# Patient Record
Sex: FEMALE | Race: ASIAN | NOT HISPANIC OR LATINO | URBAN - METROPOLITAN AREA
[De-identification: names, ages, dates, MRNs, and addresses within clinical notes are randomized per-mention and may not be internally consistent; named-entity substitution may affect disease eponyms.]

---

## 2019-03-04 ENCOUNTER — INPATIENT (INPATIENT)
Facility: HOSPITAL | Age: 84
LOS: 13 days | DRG: 208 | End: 2019-03-18
Attending: INTERNAL MEDICINE | Admitting: INTERNAL MEDICINE
Payer: MEDICARE

## 2019-03-04 VITALS
DIASTOLIC BLOOD PRESSURE: 78 MMHG | SYSTOLIC BLOOD PRESSURE: 126 MMHG | HEART RATE: 62 BPM | OXYGEN SATURATION: 96 % | WEIGHT: 80.03 LBS | RESPIRATION RATE: 19 BRPM

## 2019-03-04 DIAGNOSIS — J96.91 RESPIRATORY FAILURE, UNSPECIFIED WITH HYPOXIA: ICD-10-CM

## 2019-03-04 LAB
ALBUMIN SERPL ELPH-MCNC: 2.9 G/DL — LOW (ref 3.3–5)
ALBUMIN SERPL ELPH-MCNC: 3.6 G/DL — SIGNIFICANT CHANGE UP (ref 3.3–5)
ALP SERPL-CCNC: 106 U/L — SIGNIFICANT CHANGE UP (ref 40–120)
ALP SERPL-CCNC: 89 U/L — SIGNIFICANT CHANGE UP (ref 40–120)
ALT FLD-CCNC: 18 U/L — SIGNIFICANT CHANGE UP (ref 10–45)
ALT FLD-CCNC: 19 U/L — SIGNIFICANT CHANGE UP (ref 10–45)
ANION GAP SERPL CALC-SCNC: 13 MMOL/L — SIGNIFICANT CHANGE UP (ref 5–17)
ANION GAP SERPL CALC-SCNC: 14 MMOL/L — SIGNIFICANT CHANGE UP (ref 5–17)
APPEARANCE UR: CLEAR — SIGNIFICANT CHANGE UP
APTT BLD: 27.5 SEC — SIGNIFICANT CHANGE UP (ref 27.5–36.3)
APTT BLD: 29.9 SEC — SIGNIFICANT CHANGE UP (ref 27.5–36.3)
AST SERPL-CCNC: 33 U/L — SIGNIFICANT CHANGE UP (ref 10–40)
AST SERPL-CCNC: 36 U/L — SIGNIFICANT CHANGE UP (ref 10–40)
BACTERIA # UR AUTO: NEGATIVE — SIGNIFICANT CHANGE UP
BASOPHILS # BLD AUTO: 0 K/UL — SIGNIFICANT CHANGE UP (ref 0–0.2)
BASOPHILS # BLD AUTO: 0 K/UL — SIGNIFICANT CHANGE UP (ref 0–0.2)
BASOPHILS NFR BLD AUTO: 0.1 % — SIGNIFICANT CHANGE UP (ref 0–2)
BASOPHILS NFR BLD AUTO: 0.3 % — SIGNIFICANT CHANGE UP (ref 0–2)
BILIRUB SERPL-MCNC: 0.6 MG/DL — SIGNIFICANT CHANGE UP (ref 0.2–1.2)
BILIRUB SERPL-MCNC: 0.7 MG/DL — SIGNIFICANT CHANGE UP (ref 0.2–1.2)
BILIRUB UR-MCNC: NEGATIVE — SIGNIFICANT CHANGE UP
BUN SERPL-MCNC: 47 MG/DL — HIGH (ref 7–23)
BUN SERPL-MCNC: 49 MG/DL — HIGH (ref 7–23)
CALCIUM SERPL-MCNC: 9.3 MG/DL — SIGNIFICANT CHANGE UP (ref 8.4–10.5)
CALCIUM SERPL-MCNC: 9.9 MG/DL — SIGNIFICANT CHANGE UP (ref 8.4–10.5)
CHLORIDE SERPL-SCNC: 100 MMOL/L — SIGNIFICANT CHANGE UP (ref 96–108)
CHLORIDE SERPL-SCNC: 99 MMOL/L — SIGNIFICANT CHANGE UP (ref 96–108)
CK MB BLD-MCNC: 2.7 % — SIGNIFICANT CHANGE UP (ref 0–3.5)
CK MB CFR SERPL CALC: 4.2 NG/ML — HIGH (ref 0–3.8)
CK SERPL-CCNC: 154 U/L — SIGNIFICANT CHANGE UP (ref 25–170)
CO2 SERPL-SCNC: 31 MMOL/L — SIGNIFICANT CHANGE UP (ref 22–31)
CO2 SERPL-SCNC: 32 MMOL/L — HIGH (ref 22–31)
COLOR SPEC: YELLOW — SIGNIFICANT CHANGE UP
CREAT ?TM UR-MCNC: 71 MG/DL — SIGNIFICANT CHANGE UP
CREAT SERPL-MCNC: 1.39 MG/DL — HIGH (ref 0.5–1.3)
CREAT SERPL-MCNC: 1.52 MG/DL — HIGH (ref 0.5–1.3)
DIFF PNL FLD: NEGATIVE — SIGNIFICANT CHANGE UP
DIGOXIN SERPL-MCNC: 2.9 NG/ML — CRITICAL HIGH (ref 0.8–2)
EOSINOPHIL # BLD AUTO: 0 K/UL — SIGNIFICANT CHANGE UP (ref 0–0.5)
EOSINOPHIL # BLD AUTO: 0 K/UL — SIGNIFICANT CHANGE UP (ref 0–0.5)
EOSINOPHIL NFR BLD AUTO: 0.2 % — SIGNIFICANT CHANGE UP (ref 0–6)
EOSINOPHIL NFR BLD AUTO: 0.5 % — SIGNIFICANT CHANGE UP (ref 0–6)
EPI CELLS # UR: 1 /HPF — SIGNIFICANT CHANGE UP
GAS PNL BLDA: SIGNIFICANT CHANGE UP
GAS PNL BLDV: SIGNIFICANT CHANGE UP
GLUCOSE SERPL-MCNC: 268 MG/DL — HIGH (ref 70–99)
GLUCOSE SERPL-MCNC: 269 MG/DL — HIGH (ref 70–99)
GLUCOSE UR QL: ABNORMAL
HCT VFR BLD CALC: 30.7 % — LOW (ref 34.5–45)
HCT VFR BLD CALC: 37.1 % — SIGNIFICANT CHANGE UP (ref 34.5–45)
HGB BLD-MCNC: 10.1 G/DL — LOW (ref 11.5–15.5)
HGB BLD-MCNC: 11.9 G/DL — SIGNIFICANT CHANGE UP (ref 11.5–15.5)
HYALINE CASTS # UR AUTO: 10 /LPF — HIGH (ref 0–7)
INR BLD: 1.03 RATIO — SIGNIFICANT CHANGE UP (ref 0.88–1.16)
INR BLD: 1.03 RATIO — SIGNIFICANT CHANGE UP (ref 0.88–1.16)
KETONES UR-MCNC: NEGATIVE — SIGNIFICANT CHANGE UP
LEUKOCYTE ESTERASE UR-ACNC: NEGATIVE — SIGNIFICANT CHANGE UP
LYMPHOCYTES # BLD AUTO: 0.4 K/UL — LOW (ref 1–3.3)
LYMPHOCYTES # BLD AUTO: 0.5 K/UL — LOW (ref 1–3.3)
LYMPHOCYTES # BLD AUTO: 7.3 % — LOW (ref 13–44)
LYMPHOCYTES # BLD AUTO: 7.5 % — LOW (ref 13–44)
MAGNESIUM SERPL-MCNC: 2.2 MG/DL — SIGNIFICANT CHANGE UP (ref 1.6–2.6)
MCHC RBC-ENTMCNC: 31.9 GM/DL — LOW (ref 32–36)
MCHC RBC-ENTMCNC: 32.2 PG — SIGNIFICANT CHANGE UP (ref 27–34)
MCHC RBC-ENTMCNC: 32.8 GM/DL — SIGNIFICANT CHANGE UP (ref 32–36)
MCHC RBC-ENTMCNC: 32.8 PG — SIGNIFICANT CHANGE UP (ref 27–34)
MCV RBC AUTO: 100 FL — SIGNIFICANT CHANGE UP (ref 80–100)
MCV RBC AUTO: 101 FL — HIGH (ref 80–100)
MONOCYTES # BLD AUTO: 0.5 K/UL — SIGNIFICANT CHANGE UP (ref 0–0.9)
MONOCYTES # BLD AUTO: 0.7 K/UL — SIGNIFICANT CHANGE UP (ref 0–0.9)
MONOCYTES NFR BLD AUTO: 10.9 % — SIGNIFICANT CHANGE UP (ref 2–14)
MONOCYTES NFR BLD AUTO: 9.3 % — SIGNIFICANT CHANGE UP (ref 2–14)
NEUTROPHILS # BLD AUTO: 4.5 K/UL — SIGNIFICANT CHANGE UP (ref 1.8–7.4)
NEUTROPHILS # BLD AUTO: 5.4 K/UL — SIGNIFICANT CHANGE UP (ref 1.8–7.4)
NEUTROPHILS NFR BLD AUTO: 81.2 % — HIGH (ref 43–77)
NEUTROPHILS NFR BLD AUTO: 82.8 % — HIGH (ref 43–77)
NITRITE UR-MCNC: NEGATIVE — SIGNIFICANT CHANGE UP
PH UR: 5.5 — SIGNIFICANT CHANGE UP (ref 5–8)
PHOSPHATE SERPL-MCNC: 4.7 MG/DL — HIGH (ref 2.5–4.5)
PLATELET # BLD AUTO: 178 K/UL — SIGNIFICANT CHANGE UP (ref 150–400)
PLATELET # BLD AUTO: 233 K/UL — SIGNIFICANT CHANGE UP (ref 150–400)
POTASSIUM SERPL-MCNC: 5.1 MMOL/L — SIGNIFICANT CHANGE UP (ref 3.5–5.3)
POTASSIUM SERPL-MCNC: 5.2 MMOL/L — SIGNIFICANT CHANGE UP (ref 3.5–5.3)
POTASSIUM SERPL-SCNC: 5.1 MMOL/L — SIGNIFICANT CHANGE UP (ref 3.5–5.3)
POTASSIUM SERPL-SCNC: 5.2 MMOL/L — SIGNIFICANT CHANGE UP (ref 3.5–5.3)
PROCALCITONIN SERPL-MCNC: 0.33 NG/ML — HIGH (ref 0.02–0.1)
PROT SERPL-MCNC: 6.2 G/DL — SIGNIFICANT CHANGE UP (ref 6–8.3)
PROT SERPL-MCNC: 7.4 G/DL — SIGNIFICANT CHANGE UP (ref 6–8.3)
PROT UR-MCNC: 100 — SIGNIFICANT CHANGE UP
PROTHROM AB SERPL-ACNC: 11.9 SEC — SIGNIFICANT CHANGE UP (ref 10–12.9)
PROTHROM AB SERPL-ACNC: 11.9 SEC — SIGNIFICANT CHANGE UP (ref 10–12.9)
RAPID RVP RESULT: SIGNIFICANT CHANGE UP
RBC # BLD: 3.07 M/UL — LOW (ref 3.8–5.2)
RBC # BLD: 3.69 M/UL — LOW (ref 3.8–5.2)
RBC # FLD: 15.4 % — HIGH (ref 10.3–14.5)
RBC # FLD: 15.6 % — HIGH (ref 10.3–14.5)
RBC CASTS # UR COMP ASSIST: 2 /HPF — SIGNIFICANT CHANGE UP (ref 0–4)
SODIUM SERPL-SCNC: 144 MMOL/L — SIGNIFICANT CHANGE UP (ref 135–145)
SODIUM SERPL-SCNC: 145 MMOL/L — SIGNIFICANT CHANGE UP (ref 135–145)
SODIUM UR-SCNC: 46 MMOL/L — SIGNIFICANT CHANGE UP
SP GR SPEC: 1.02 — SIGNIFICANT CHANGE UP (ref 1.01–1.02)
TROPONIN T, HIGH SENSITIVITY RESULT: 61 NG/L — HIGH (ref 0–51)
TROPONIN T, HIGH SENSITIVITY RESULT: 77 NG/L — HIGH (ref 0–51)
UROBILINOGEN FLD QL: ABNORMAL
WBC # BLD: 5.5 K/UL — SIGNIFICANT CHANGE UP (ref 3.8–10.5)
WBC # BLD: 6.6 K/UL — SIGNIFICANT CHANGE UP (ref 3.8–10.5)
WBC # FLD AUTO: 5.5 K/UL — SIGNIFICANT CHANGE UP (ref 3.8–10.5)
WBC # FLD AUTO: 6.6 K/UL — SIGNIFICANT CHANGE UP (ref 3.8–10.5)
WBC UR QL: 1 /HPF — SIGNIFICANT CHANGE UP (ref 0–5)

## 2019-03-04 PROCEDURE — 71045 X-RAY EXAM CHEST 1 VIEW: CPT | Mod: 26,76

## 2019-03-04 PROCEDURE — 99291 CRITICAL CARE FIRST HOUR: CPT

## 2019-03-04 PROCEDURE — 71045 X-RAY EXAM CHEST 1 VIEW: CPT | Mod: 26,77

## 2019-03-04 PROCEDURE — 31500 INSERT EMERGENCY AIRWAY: CPT

## 2019-03-04 PROCEDURE — 99291 CRITICAL CARE FIRST HOUR: CPT | Mod: 25,GC

## 2019-03-04 PROCEDURE — 71250 CT THORAX DX C-: CPT | Mod: 26

## 2019-03-04 RX ORDER — INSULIN LISPRO 100/ML
VIAL (ML) SUBCUTANEOUS EVERY 6 HOURS
Qty: 0 | Refills: 0 | Status: DISCONTINUED | OUTPATIENT
Start: 2019-03-04 | End: 2019-03-07

## 2019-03-04 RX ORDER — ETOMIDATE 2 MG/ML
20 INJECTION INTRAVENOUS ONCE
Qty: 0 | Refills: 0 | Status: COMPLETED | OUTPATIENT
Start: 2019-03-04 | End: 2019-03-04

## 2019-03-04 RX ORDER — VANCOMYCIN HCL 1 G
1000 VIAL (EA) INTRAVENOUS ONCE
Qty: 0 | Refills: 0 | Status: DISCONTINUED | OUTPATIENT
Start: 2019-03-04 | End: 2019-03-04

## 2019-03-04 RX ORDER — ACETAMINOPHEN 500 MG
1000 TABLET ORAL ONCE
Qty: 0 | Refills: 0 | Status: DISCONTINUED | OUTPATIENT
Start: 2019-03-04 | End: 2019-03-04

## 2019-03-04 RX ORDER — SODIUM CHLORIDE 9 MG/ML
1000 INJECTION, SOLUTION INTRAVENOUS
Qty: 0 | Refills: 0 | Status: DISCONTINUED | OUTPATIENT
Start: 2019-03-04 | End: 2019-03-05

## 2019-03-04 RX ORDER — PIPERACILLIN AND TAZOBACTAM 4; .5 G/20ML; G/20ML
3.38 INJECTION, POWDER, LYOPHILIZED, FOR SOLUTION INTRAVENOUS EVERY 12 HOURS
Qty: 0 | Refills: 0 | Status: DISCONTINUED | OUTPATIENT
Start: 2019-03-04 | End: 2019-03-17

## 2019-03-04 RX ORDER — ASPIRIN/CALCIUM CARB/MAGNESIUM 324 MG
81 TABLET ORAL DAILY
Qty: 0 | Refills: 0 | Status: DISCONTINUED | OUTPATIENT
Start: 2019-03-04 | End: 2019-03-17

## 2019-03-04 RX ORDER — GLUCAGON INJECTION, SOLUTION 0.5 MG/.1ML
1 INJECTION, SOLUTION SUBCUTANEOUS ONCE
Qty: 0 | Refills: 0 | Status: DISCONTINUED | OUTPATIENT
Start: 2019-03-04 | End: 2019-03-05

## 2019-03-04 RX ORDER — IPRATROPIUM/ALBUTEROL SULFATE 18-103MCG
3 AEROSOL WITH ADAPTER (GRAM) INHALATION EVERY 6 HOURS
Qty: 0 | Refills: 0 | Status: DISCONTINUED | OUTPATIENT
Start: 2019-03-04 | End: 2019-03-18

## 2019-03-04 RX ORDER — DEXTROSE 50 % IN WATER 50 %
25 SYRINGE (ML) INTRAVENOUS ONCE
Qty: 0 | Refills: 0 | Status: DISCONTINUED | OUTPATIENT
Start: 2019-03-04 | End: 2019-03-05

## 2019-03-04 RX ORDER — FENTANYL CITRATE 50 UG/ML
0.5 INJECTION INTRAVENOUS
Qty: 2500 | Refills: 0 | Status: DISCONTINUED | OUTPATIENT
Start: 2019-03-04 | End: 2019-03-04

## 2019-03-04 RX ORDER — PIPERACILLIN AND TAZOBACTAM 4; .5 G/20ML; G/20ML
3.38 INJECTION, POWDER, LYOPHILIZED, FOR SOLUTION INTRAVENOUS ONCE
Qty: 0 | Refills: 0 | Status: COMPLETED | OUTPATIENT
Start: 2019-03-04 | End: 2019-03-04

## 2019-03-04 RX ORDER — SUCCINYLCHOLINE CHLORIDE 100 MG/5ML
100 SYRINGE (ML) INTRAVENOUS ONCE
Qty: 0 | Refills: 0 | Status: COMPLETED | OUTPATIENT
Start: 2019-03-04 | End: 2019-03-04

## 2019-03-04 RX ORDER — FENTANYL CITRATE 50 UG/ML
0.5 INJECTION INTRAVENOUS
Qty: 5000 | Refills: 0 | Status: DISCONTINUED | OUTPATIENT
Start: 2019-03-04 | End: 2019-03-04

## 2019-03-04 RX ORDER — AZITHROMYCIN 500 MG/1
500 TABLET, FILM COATED ORAL EVERY 24 HOURS
Qty: 0 | Refills: 0 | Status: DISCONTINUED | OUTPATIENT
Start: 2019-03-04 | End: 2019-03-07

## 2019-03-04 RX ORDER — FENTANYL CITRATE 50 UG/ML
12.5 INJECTION INTRAVENOUS EVERY 4 HOURS
Qty: 0 | Refills: 0 | Status: DISCONTINUED | OUTPATIENT
Start: 2019-03-04 | End: 2019-03-07

## 2019-03-04 RX ORDER — ALBUTEROL 90 UG/1
2 AEROSOL, METERED ORAL EVERY 12 HOURS
Qty: 0 | Refills: 0 | Status: DISCONTINUED | OUTPATIENT
Start: 2019-03-04 | End: 2019-03-18

## 2019-03-04 RX ORDER — FUROSEMIDE 40 MG
40 TABLET ORAL ONCE
Qty: 0 | Refills: 0 | Status: COMPLETED | OUTPATIENT
Start: 2019-03-04 | End: 2019-03-04

## 2019-03-04 RX ORDER — DEXTROSE 50 % IN WATER 50 %
12.5 SYRINGE (ML) INTRAVENOUS ONCE
Qty: 0 | Refills: 0 | Status: DISCONTINUED | OUTPATIENT
Start: 2019-03-04 | End: 2019-03-05

## 2019-03-04 RX ORDER — MIDAZOLAM HYDROCHLORIDE 1 MG/ML
0.02 INJECTION, SOLUTION INTRAMUSCULAR; INTRAVENOUS
Qty: 100 | Refills: 0 | Status: DISCONTINUED | OUTPATIENT
Start: 2019-03-04 | End: 2019-03-04

## 2019-03-04 RX ORDER — NOREPINEPHRINE BITARTRATE/D5W 8 MG/250ML
0.05 PLASTIC BAG, INJECTION (ML) INTRAVENOUS
Qty: 8 | Refills: 0 | Status: DISCONTINUED | OUTPATIENT
Start: 2019-03-04 | End: 2019-03-05

## 2019-03-04 RX ORDER — HEPARIN SODIUM 5000 [USP'U]/ML
5000 INJECTION INTRAVENOUS; SUBCUTANEOUS EVERY 12 HOURS
Qty: 0 | Refills: 0 | Status: DISCONTINUED | OUTPATIENT
Start: 2019-03-04 | End: 2019-03-17

## 2019-03-04 RX ORDER — DEXTROSE 50 % IN WATER 50 %
15 SYRINGE (ML) INTRAVENOUS ONCE
Qty: 0 | Refills: 0 | Status: DISCONTINUED | OUTPATIENT
Start: 2019-03-04 | End: 2019-03-05

## 2019-03-04 RX ORDER — VANCOMYCIN HCL 1 G
750 VIAL (EA) INTRAVENOUS EVERY 24 HOURS
Qty: 0 | Refills: 0 | Status: DISCONTINUED | OUTPATIENT
Start: 2019-03-04 | End: 2019-03-07

## 2019-03-04 RX ORDER — AZITHROMYCIN 500 MG/1
500 TABLET, FILM COATED ORAL ONCE
Qty: 0 | Refills: 0 | Status: COMPLETED | OUTPATIENT
Start: 2019-03-04 | End: 2019-03-04

## 2019-03-04 RX ADMIN — AZITHROMYCIN 250 MILLIGRAM(S): 500 TABLET, FILM COATED ORAL at 16:07

## 2019-03-04 RX ADMIN — Medication 40 MILLIGRAM(S): at 14:24

## 2019-03-04 RX ADMIN — HEPARIN SODIUM 5000 UNIT(S): 5000 INJECTION INTRAVENOUS; SUBCUTANEOUS at 18:51

## 2019-03-04 RX ADMIN — Medication 2: at 21:49

## 2019-03-04 RX ADMIN — Medication 81 MILLIGRAM(S): at 21:49

## 2019-03-04 RX ADMIN — Medication 100 MILLIGRAM(S): at 15:52

## 2019-03-04 RX ADMIN — FENTANYL CITRATE 0.91 MICROGRAM(S)/KG/HR: 50 INJECTION INTRAVENOUS at 16:30

## 2019-03-04 RX ADMIN — ETOMIDATE 20 MILLIGRAM(S): 2 INJECTION INTRAVENOUS at 15:52

## 2019-03-04 RX ADMIN — PIPERACILLIN AND TAZOBACTAM 200 GRAM(S): 4; .5 INJECTION, POWDER, LYOPHILIZED, FOR SOLUTION INTRAVENOUS at 14:24

## 2019-03-04 RX ADMIN — Medication 3.4 MICROGRAM(S)/KG/MIN: at 22:51

## 2019-03-04 NOTE — ED PROVIDER NOTE - CRITICAL CARE PROVIDED
direct patient care (not related to procedure)/consultation with other physicians/conducted a detailed discussion of DNR status/additional history taking/documentation/consult w/ pt's family directly relating to pts condition

## 2019-03-04 NOTE — ED PROCEDURE NOTE - CPROC ED INFORMED CONSENT1
This was an emergent procedure and consent was implied./family aware and agree
Benefits, risks, and possible complications of procedure explained to patient/caregiver who verbalized understanding and gave verbal consent.

## 2019-03-04 NOTE — CONSULT NOTE ADULT - SUBJECTIVE AND OBJECTIVE BOX
CHIEF COMPLAINT:    HPI:    PAST MEDICAL & SURGICAL HISTORY:  Arthritis  Asthma  Diabetes  Hypertension  Alzheimer's dementia  H/O abdominal surgery      FAMILY HISTORY:  No pertinent family history in first degree relatives      SOCIAL HISTORY:  Smoking: [ ] Never Smoked [ ] Former Smoker (__ packs x ___ years) [ ] Current Smoker  (__ packs x ___ years)  Substance Use: [ ] Never Used [ ] Used ____  EtOH Use:  Marital Status: [ ] Single [ ]  [ ]  [ ]   Sexual History:   Occupation:  Recent Travel:  Country of Birth:  Advance Directives:    Allergies    No Known Allergies    Intolerances        HOME MEDICATIONS:    REVIEW OF SYSTEMS:  CONSTITUTIONAL: No weakness, fevers or chills  EYES/ENT: No visual changes;  No vertigo or throat pain   NECK: No pain or stiffness  RESPIRATORY: No cough, wheezing, hemoptysis; No shortness of breath  CARDIOVASCULAR: No chest pain or palpitations  GASTROINTESTINAL: No abdominal or epigastric pain. No nausea, vomiting, or hematemesis; No diarrhea or constipation. No melena or hematochezia.  GENITOURINARY: No dysuria, frequency or hematuria  NEUROLOGICAL: No numbness or weakness  SKIN: No itching, rashes      OBJECTIVE:  ICU Vital Signs Last 24 Hrs  T(C): --  T(F): --  HR: 61 (04 Mar 2019 12:41) (61 - 62)  BP: 147/49 (04 Mar 2019 12:25) (126/78 - 147/49)  BP(mean): --  ABP: --  ABP(mean): --  RR: 19 (04 Mar 2019 12:25) (19 - 19)  SpO2: 99% (04 Mar 2019 12:41) (96% - 100%)        CAPILLARY BLOOD GLUCOSE          PHYSICAL EXAM:        HOSPITAL MEDICATIONS:  MEDICATIONS  (STANDING):  azithromycin  IVPB 500 milliGRAM(s) IV Intermittent Once  furosemide   Injectable 40 milliGRAM(s) IV Push Once  piperacillin/tazobactam IVPB. 3.375 Gram(s) IV Intermittent once  vancomycin  IVPB 1000 milliGRAM(s) IV Intermittent once    MEDICATIONS  (PRN):      LABS:                        11.9   6.6   )-----------( 233      ( 04 Mar 2019 11:40 )             37.1     Hgb Trend: 11.9<--  03-04    144  |  99  |  47<H>  ----------------------------<  269<H>  5.1   |  31  |  1.39<H>    Ca    9.9      04 Mar 2019 11:40    TPro  7.4  /  Alb  3.6  /  TBili  0.7  /  DBili  x   /  AST  36  /  ALT  19  /  AlkPhos  106  03-04    Creatinine Trend: 1.39<--  PT/INR - ( 04 Mar 2019 11:40 )   PT: 11.9 sec;   INR: 1.03 ratio         PTT - ( 04 Mar 2019 11:40 )  PTT:27.5 sec      Venous Blood Gas:  03-04 @ 11:39  7.21/104/36/40/41  VBG Lactate: 2.7      MICROBIOLOGY:     RADIOLOGY:  [ ] Reviewed and interpreted by me    EKG:        [facility] (ext. [id number]) CHIEF COMPLAINT:    HPI:    PAST MEDICAL & SURGICAL HISTORY:  Arthritis  Asthma  Diabetes  Hypertension  Alzheimer's dementia  H/O abdominal surgery      FAMILY HISTORY:  No pertinent family history in first degree relatives      SOCIAL HISTORY:  Smoking: [ ] Never Smoked [ ] Former Smoker (__ packs x ___ years) [ ] Current Smoker  (__ packs x ___ years)  Substance Use: [ ] Never Used [ ] Used ____  EtOH Use:  Marital Status: [ ] Single [ ]  [ ]  [ ]   Sexual History:   Occupation:  Recent Travel:  Country of Birth:  Advance Directives:    Allergies    No Known Allergies    Intolerances        HOME MEDICATIONS:    REVIEW OF SYSTEMS:      OBJECTIVE:  ICU Vital Signs Last 24 Hrs  T(C): --  T(F): --  HR: 61 (04 Mar 2019 12:41) (61 - 62)  BP: 147/49 (04 Mar 2019 12:25) (126/78 - 147/49)  BP(mean): --  ABP: --  ABP(mean): --  RR: 19 (04 Mar 2019 12:25) (19 - 19)  SpO2: 99% (04 Mar 2019 12:41) (96% - 100%)        CAPILLARY BLOOD GLUCOSE          PHYSICAL EXAM:        HOSPITAL MEDICATIONS:  MEDICATIONS  (STANDING):  azithromycin  IVPB 500 milliGRAM(s) IV Intermittent Once  furosemide   Injectable 40 milliGRAM(s) IV Push Once  piperacillin/tazobactam IVPB. 3.375 Gram(s) IV Intermittent once  vancomycin  IVPB 1000 milliGRAM(s) IV Intermittent once    MEDICATIONS  (PRN):      LABS:                        11.9   6.6   )-----------( 233      ( 04 Mar 2019 11:40 )             37.1     Hgb Trend: 11.9<--  03-04    144  |  99  |  47<H>  ----------------------------<  269<H>  5.1   |  31  |  1.39<H>    Ca    9.9      04 Mar 2019 11:40    TPro  7.4  /  Alb  3.6  /  TBili  0.7  /  DBili  x   /  AST  36  /  ALT  19  /  AlkPhos  106  03-04    Creatinine Trend: 1.39<--  PT/INR - ( 04 Mar 2019 11:40 )   PT: 11.9 sec;   INR: 1.03 ratio         PTT - ( 04 Mar 2019 11:40 )  PTT:27.5 sec      Venous Blood Gas:  03-04 @ 11:39  7.21/104/36/40/41  VBG Lactate: 2.7      MICROBIOLOGY:     RADIOLOGY:  [ ] Reviewed and interpreted by me    EKG:        [facility] (ext. [id number]) CHIEF COMPLAINT: progressive sob    HPI:  89 yr old female with PMHx of alzhiemer, HTN, Afib, THN, CHF (last documented EF from 2015 of 62%), Asthma, cholecystectomy who presents to ED this morning from home after developing progressive SOB with lethargy over 3 to 4 days with development of productive cough over past 24 hr. Family endorses pt with increase lower extremity edema with Lt>Rt over past few weeks initially treated with lasix changed to spironolactone last week.        In E.D. pt found to have Vph 7.21, vPCO2 104, placed on bipap therapy, [place] 8[address]. Pt received lasix 40 mg IVP, started on vanco/zosyn/zithromax.          Consult called for hypercapnic resp failure 2/2 to r/o CAP vs ADHF        PAST MEDICAL & SURGICAL HISTORY:  Arthritis  Asthma  Diabetes  Hypertension  Alzheimer's dementia  H/O abdominal surgery      FAMILY HISTORY:  No pertinent family history in first degree relatives      SOCIAL HISTORY:  Smoking: [ ] Never Smoked [ ] Former Smoker (__ packs x ___ years) [ ] Current Smoker  (__ packs x ___ years)  Substance Use: [ ] Never Used [ ] Used ____  EtOH Use:  Marital Status: [ ] Single [ ]  [ ]  [ ]   Sexual History:   Occupation:  Recent Travel:  Country of Birth:  Advance Directives:    Allergies    No Known Allergies    Intolerances        HOME MEDICATIONS:    REVIEW OF SYSTEMS:  obtained from family    CONSTITUTIONAL: + weakness, no fevers or chills  EYES/ENT: No visual changes;  No vertigo or throat pain   NECK: No pain or stiffness  RESPIRATORY: + cough, no wheezing, hemoptysis;+ shortness of breath  CARDIOVASCULAR: No chest pain or palpitations  GASTROINTESTINAL: No abdominal or epigastric pain. No nausea, vomiting, or hematemesis; No diarrhea or constipation. No melena or hematochezia.  GENITOURINARY: No dysuria, frequency or hematuria  NEUROLOGICAL: No numbness or weakness  SKIN: No itching, rashes      OBJECTIVE:  ICU Vital Signs Last 24 Hrs  T(C): --  T(F): --  HR: 61 (04 Mar 2019 12:41) (61 - 62)  BP: 147/49 (04 Mar 2019 12:25) (126/78 - 147/49)  BP(mean): --  ABP: --  ABP(mean): --  RR: 19 (04 Mar 2019 12:25) (19 - 19)  SpO2: 99% (04 Mar 2019 12:41) (96% - 100%)    CURRENT VITAL SIGNS:  BP: 149/49; HR: 60 (afib); RR: 22 on Bipap therapy with SpVt 250 -300; SPO2: 100% on 40% FIO2    CAPILLARY BLOOD GLUCOSE          PHYSICAL EXAM:  GENERAL: NAD, well-groomed, well-developed  HEAD:  Atraumatic, Normocephalic  EYES: EOMI, PERRLA, conjunctiva and sclera clear  ENMT: No oropharyngeal exudates, erythema or lesions,  Moist mucous membranes  NECK: Supple, no cervical lymphadenopathy, no JVD  NERVOUS SYSTEM:  Lethargic , with tactile stimuli has spontaneous purposeful movement of all 4 extremites 4/5 BUE and BLE motor strength, full sensation to light touch   CHEST/LUNG: Breath sounds bilat, diminished in lower lung fields bases to 1/4 up in Lt, 1/3 up in Rt. few bibasilar crackles  HEART: Afib ventricular response of 60; S1/S2 I?VI sys murmur, without rubs, or gallops  ABDOMEN: Soft, Nontender, Nondistended; Bowel sounds present, Bladder non distended, non palpable  EXTREMITIES: Pulses palpable radial pulses 2+ bilat, DP/PT 1+/1+ bilat, without clubbing,cyanosis. Digits of feet slight cool to touch with cap refill > 3 secs< 6 secs, digits of hands warm to touch with good cap refill < 3 sec. Lower extremities with 1+ pitting edema in RLE, 2+ pitting in LLE  Skin: warm, dry, intact, normal color, no rash or abnormal lesions,without palpable nodes      HOSPITAL MEDICATIONS:  MEDICATIONS  (STANDING):  azithromycin  IVPB 500 milliGRAM(s) IV Intermittent Once  furosemide   Injectable 40 milliGRAM(s) IV Push Once  piperacillin/tazobactam IVPB. 3.375 Gram(s) IV Intermittent once  vancomycin  IVPB 1000 milliGRAM(s) IV Intermittent once    MEDICATIONS  (PRN):      LABS:                        11.9   6.6   )-----------( 233      ( 04 Mar 2019 11:40 )             37.1     Hgb Trend: 11.9<--  03-04    144  |  99  |  47<H>  ----------------------------<  269<H>  5.1   |  31  |  1.39<H>    Ca    9.9      04 Mar 2019 11:40    TPro  7.4  /  Alb  3.6  /  TBili  0.7  /  DBili  x   /  AST  36  /  ALT  19  /  AlkPhos  106  03-04    Creatinine Trend: 1.39<--  PT/INR - ( 04 Mar 2019 11:40 )   PT: 11.9 sec;   INR: 1.03 ratio         PTT - ( 04 Mar 2019 11:40 )  PTT:27.5 sec      Venous Blood Gas:  03-04 @ 11:39  7.21/104/36/40/41  VBG Lactate: 2.7      MICROBIOLOGY:     RADIOLOGY:  [ ] Reviewed and interpreted by me    EKG:        [facility] (ext. [id number])

## 2019-03-04 NOTE — ED ADULT NURSE NOTE - OBJECTIVE STATEMENT
89 y/o female PMH Alzheimer's, diabetes, HTN and CHF presents to ED for SOB. Per granddaughter pt has been having increased difficulty breathing the past two days. 89 y/o female PMH Alzheimer's, diabetes, HTN and CHF presents to ED for SOB. Per granddaughter pt has been having increased difficulty breathing the past two days. Per daughter pt has had a cough and increased difficulty breathing at home. On exam, pt is responsive to pain. Bilateral lung sounds decreased, pt placed on non-rebreather, O2 sat 100% on non-rebreather. Abdomen soft, non-tender, non-distended, hypoactive bowel sounds in all 4 quadrants. CM in place, NSR 62. Heplock placed, labs sent. Awaiting BiPAP at this time per MD.

## 2019-03-04 NOTE — CONSULT NOTE ADULT - ATTENDING COMMENTS
Advanced stage Alzheimers dz cared for at home presents with dyspnea, persistent cough and lethargy although no fevers. Labs and CXR cw with right upper linear atelectasis ? airway obstruction as a cause, and large left effusion with elevated BNP suggestive of decompensated CHF. No WBC, hemodynamically stable and no fever making infectious etiology unusual although still in the differential is aspiration.     - > increase IPAP/EPAP ratio  - > ct scan of chest and if evidence of RUL obstruction on CT scan - > bronch  - > diuresis    will re-eval after CT scan    cc time 40 mns Advanced stage Alzheimers dz cared for at home presents with dyspnea, persistent cough and lethargy although no fevers. Labs and CXR cw with right upper linear atelectasis ? airway obstruction as a cause, and large left effusion with elevated BNP suggestive of decompensated CHF. No WBC, hemodynamically stable and no fever making infectious etiology unusual although still in the differential is aspiration.     - > increase IPAP/EPAP ratio  - > ct scan of chest and if evidence of RUL obstruction on CT scan - > bronch  - > diuresis    will re-eval after CT scan     - > decision made to intubate sec to hypoxemia (severe bronchiectasis on CT scan rgt> L explaining likely chronic CO2 retention)  - > check procalcitonin and send for THERESE (h/o THERESE)   - > thoracentesis    cc time 40 mns

## 2019-03-04 NOTE — H&P ADULT - ASSESSMENT
89 yr old female with PMHx of alzhiemer, HTN, Afib, THN, CHF (last documented EF from 2015 of 62%), Asthma, cholecystectomy who presents to ED this morning from home after developing progressive SOB with lethargy over 3 to 4 days with development of productive cough over past 24 hr. Family endorses pt with increase lower extremity edema with Lt>Rt over past few weeks initially treated with lasix changed to spironolactone last week.        In E.D. pt found to have Vph 7.21, vPCO2 104, placed on bipap therapy, [place] 137[address]. Pt received lasix 40 mg IVP, started on vanco/zosyn/zithromax.          Consult called for hypercapnic resp failure 2/2 to r/o CAP vs ADHF    Plan:    #Neuro:  -neuro checks q 4 hrs and prn for changes  -bedrest at present  -physical therapy consult when stable    #Pulm:  -pt presents with progressive sob with lethargy over past 3 to 4 days with development of productive cough over past 1 to 2 days  -found to have hypercapnic resp failure on VBG with ph 7.21; vPCO2 102  -bipap therapy - titrate to ph 7.35-7.45; PCO2 35-45; SPO2 > 90%  -have low threshold for intubation  -duoneb therapy q 6 hrs  -pt on home albuterol neb therapy q 12 hrs  -continue albuterol neb therapy q 12 hrs and prn sob/wheezes  -HOB >/= 30 degree angle  -chest PT q 2 hrs  -CXR with Lt pleural effusion, atelectasis, RUL atelectasis ? RLL infiltrate with platelike effusion   -consider bronchoscopy  -obtain CT chest    #CV:  -pt with Hx of CHF (EF from 2015 62%, increased LE edema Lt>Rt, treated initially with lasix, changed to spironolactone last week  -obtain ECG now and q day x 3 days  -obtain cardiac enzymes now and q 8 hrs x3  -obtain duplex of lower extremities to r/o DVT (as above)  -pt with hx of afib on dig  -obtain dig level  -obtain TTE to eval progression of heart disease  -given elevated BNP - continue lasix 40 mg IV q 12 hrs - keep output 500 cc/hr    #GI/:  -NPO at present while on bipap therapy  -dietician consult  -while NPO - protonix 40 mg IV qd  -Pt with Hx of VANIA/CKD - currently with sCr 1.39  -strict I & O's keep even to neg 500 cc's  -place vieyra    #ID:  -pan culture  -obtain RVP   -obtain urine legionella  -zosyn 3.375 gm q 8 hrs   -vanco 1 gm IV qd  -zithromax 500 mg IV qday    #FEN/ENDO/HEME:  -cmp/mg++/po--4/cbc with diff/coags now and q day  -abg prn  -Pt with hx of DM2 on oral hypoglycemics  -POC glucose q 4 hrs with insulin sliding scale - maintain glucose 140 to 160

## 2019-03-04 NOTE — ED ADULT NURSE REASSESSMENT NOTE - NS ED NURSE REASSESS COMMENT FT1
Pt intubated by MD Gallego. Pt received 20mg Etomodate and 100mg Succinylcholine IV by MD Berger. ETT 7.5mm & 23 at the lip line. Positive color change, bilateral breath sounds, sat 97% on vent. Awaiting transfer to MICU.

## 2019-03-04 NOTE — ED ADULT NURSE NOTE - NSIMPLEMENTINTERV_GEN_ALL_ED
Implemented All Fall with Harm Risk Interventions:  [place] to call system. Call bell, personal items and telephone within reach. Instruct patient to call for assistance. Room bathroom lighting operational. Non-slip footwear when patient is off stretcher. Physically safe environment: no spills, clutter or unnecessary equipment. Stretcher in lowest position, wheels locked, appropriate side rails in place. Provide visual cue, wrist band, yellow gown, etc. Monitor gait and stability. Monitor for mental status changes and reorient to person, place, and time. Review medications for side effects contributing to fall risk. Reinforce activity limits and safety measures with patient and family. Provide visual clues: red socks.

## 2019-03-04 NOTE — ED ADULT NURSE REASSESSMENT NOTE - NS ED NURSE REASSESS COMMENT FT1
Pt tolerating BiPAP well, pt remains on cardiac monitor, VSS. Upon further assessment warm, pink, dry and intact.

## 2019-03-04 NOTE — ED PROVIDER NOTE - PROGRESS NOTE DETAILS
Julián RICKETTS: Patient noted to be getting progressively hypoxic; down to 92% on BiPAP.  Given high pCO2 on ABG and worsening respiratory status; patient intubated.  ICU aware. Julián RICKETTS: Patient noted to be bradycardic and hypotensive after intubation; Versed not started on patient and kept on Fentanyl for sedation.  Levophed ordered.

## 2019-03-04 NOTE — ED PROVIDER NOTE - CLINICAL SUMMARY MEDICAL DECISION MAKING FREE TEXT BOX
89 year-old female with history of Alzheimer’s, DMT2, HTN, CHF presents to the Emergency Department for shortness of breath; concerning for hypoxic respiratory failure from CHF vs. PNA vs. ACS. 89 year-old female with history of Alzheimer’s, DMT2, HTN, CHF presents to the Emergency Department for shortness of breath; concerning for hypoxic respiratory failure from CHF vs. PNA vs. ACS.    VALENTINA Alejandro MD: Pt is a 89 y/o female with PMH Alzheimer’s, DMT2, HTN, CHF, on digoxin, who is BIB EMS for respiratory distress. Per granddaughter, pt has been having progressively worsening respiratory difficulty x 2 days, now being less responsive from her baseline, not opening eyes for long periods of time. Per granddaughter, patient was on Lasix for "LE edema" which was d/c 1 week ago and switched to Spironolactone earlier this week.  + cough; no fever, vomiting.  Patient was seen at a OSH approx. 3 weeks ago for SOB and d/c home.  History provided by family and EMS. 89 year-old female with history of Alzheimer’s, DMT2, HTN, CHF presents to the Emergency Department for shortness of breath; concerning for hypoxic respiratory failure from CHF vs. PNA vs. ACS.    VALENTINA Alejandro MD: Pt is a 89 y/o female with PMH Alzheimer’s, DMT2, HTN, CHF, on digoxin, who is BIB EMS for respiratory distress. Per granddaughter, pt has been having progressively worsening respiratory difficulty x 2 days, now being less responsive from her baseline. Per granddaughter, patient was on Lasix for "LE edema" which was d/c 1 week ago and switched to Spironolactone earlier this week.  + cough; no fever, vomiting. Pt appears to be in acute respiratory failure with poor effort, however, responding to voice and pain and following commands. Per granddaughter who spoke with patient's daughter, who is HCP, they would like pt to be full code. DDx: CHF, COPD, PNA, ACS, ICH. Plan: basic labs, bcx, bnp, trop, cxr, u/a, ucx, vieyra and strict I/Os, trial of BIPAP prior to possible intubation 89 year-old female with history of Alzheimer’s, DMT2, HTN, CHF presents to the Emergency Department for shortness of breath; concerning for hypoxic respiratory failure from CHF vs. PNA vs. ACS.    VALENTINA Alejandro MD: Pt is a 89 y/o female with PMH Alzheimer’s, DMT2, HTN, CHF, on digoxin, who is BIB EMS for respiratory distress. Per granddaughter, pt has been having progressively worsening respiratory difficulty x 2 days, now being less responsive from her baseline. Per granddaughter, patient was on Lasix for "LE edema" which was d/c 1 week ago and switched to Spironolactone earlier this week.  + cough; no fever, vomiting. Pt appears to be in acute respiratory failure with poor effort, however, responding to voice and pain and following commands. Per granddaughter who spoke with patient's daughter, who is HCP, they would like pt to be full code. DDx: CHF, COPD, PNA, ACS. Plan: basic labs, bcx, bnp, trop, cxr, u/a, ucx, vieyra and strict I/Os, trial of BIPAP prior to possible intubation

## 2019-03-04 NOTE — ED PROVIDER NOTE - OBJECTIVE STATEMENT
89 year-old female with history of Alzheimer’s, DMT2, HTN, CHF presents to the Emergency Department for shortness of breath.  Patient has been having progressive worsening respiratory difficulty x 2 days; has become increasingly fatigued and less responsive than baseline.  Patient was on Lasix which was changed to Spironolactone earlier this week.  + cough; no fever, vomiting.  Patient was seen at a OSH approx. 3 weeks ago for SOB and d/c home.  History provided by family and EMS. 89 year-old female with history of Alzheimer’s, DMT2, HTN, CHF presents to the Emergency Department for shortness of breath.  Patient has been having progressive worsening respiratory difficulty x 2 days; has become increasingly fatigued and less responsive than baseline.  Patient was on Lasix which was changed to Spironolactone earlier this week.  + cough; no fever, vomiting.  Patient was seen at a OSH approx. 3 weeks ago for SOB and d/c home.  History provided by family and EMS.    Discussion made with family at bedside and patient is not DNR/DNI at this time.

## 2019-03-04 NOTE — H&P ADULT - HISTORY OF PRESENT ILLNESS
89 yr old female with PMHx of alzhiemer, HTN, Afib, THN, CHF (last documented EF from 2015 of 62%), Asthma, cholecystectomy who presents to ED this morning from home after developing progressive SOB with lethargy over 3 to 4 days with development of productive cough over past 24 hr. Family endorses pt with increase lower extremity edema with Lt>Rt over past few weeks initially treated with lasix changed to spironolactone last week.        In E.D. pt found to have Vph 7.21, vPCO2 104, placed on bipap therapy, [place] [address]. Pt received lasix 40 mg IVP, started on vanco/zosyn/zithromax.          Consult called for hypercapnic resp failure 2/2 to r/o CAP vs ADHF        PAST MEDICAL & SURGICAL HISTORY:  Arthritis  Asthma  Diabetes  Hypertension  Alzheimer's dementia  H/O abdominal surgery      FAMILY HISTORY:  No pertinent family history in first degree relatives      SOCIAL HISTORY:  Smoking: [ ] Never Smoked [ ] Former Smoker (__ packs x ___ years) [ ] Current Smoker  (__ packs x ___ years)  Substance Use: [ ] Never Used [ ] Used ____  EtOH Use:  Marital Status: [ ] Single [ ]  [ ]  [ ]   Sexual History:   Occupation:  Recent Travel:  Country of Birth:  Advance Directives:    Allergies    No Known Allergies    Intolerances        HOME MEDICATIONS:    REVIEW OF SYSTEMS:  obtained from family    CONSTITUTIONAL: + weakness, no fevers or chills  EYES/ENT: No visual changes;  No vertigo or throat pain   NECK: No pain or stiffness  RESPIRATORY: + cough, no wheezing, hemoptysis;+ shortness of breath  CARDIOVASCULAR: No chest pain or palpitations  GASTROINTESTINAL: No abdominal or epigastric pain. No nausea, vomiting, or hematemesis; No diarrhea or constipation. No melena or hematochezia.  GENITOURINARY: No dysuria, frequency or hematuria  NEUROLOGICAL: No numbness or weakness  SKIN: No itching, rashes      OBJECTIVE:  ICU Vital Signs Last 24 Hrs  T(C): --  T(F): --  HR: 61 (04 Mar 2019 12:41) (61 - 62)  BP: 147/49 (04 Mar 2019 12:25) (126/78 - 147/49)  BP(mean): --  ABP: --  ABP(mean): --  RR: 19 (04 Mar 2019 12:25) (19 - 19)  SpO2: 99% (04 Mar 2019 12:41) (96% - 100%)    CURRENT VITAL SIGNS:  BP: 149/49; HR: 60 (afib); RR: 22 on Bipap therapy with SpVt 250 -300; SPO2: 100% on 40% FIO2    CAPILLARY BLOOD GLUCOSE          PHYSICAL EXAM:  GENERAL: NAD, well-groomed, well-developed  HEAD:  Atraumatic, Normocephalic  EYES: EOMI, PERRLA, conjunctiva and sclera clear  ENMT: No oropharyngeal exudates, erythema or lesions,  Moist mucous membranes  NECK: Supple, no cervical lymphadenopathy, no JVD  NERVOUS SYSTEM:  Lethargic , with tactile stimuli has spontaneous purposeful movement of all 4 extremites 4/5 BUE and BLE motor strength, full sensation to light touch   CHEST/LUNG: Breath sounds bilat, diminished in lower lung fields bases to 1/4 up in Lt, 1/3 up in Rt. few bibasilar crackles  HEART: Afib ventricular response of 60; S1/S2 I?VI sys murmur, without rubs, or gallops  ABDOMEN: Soft, Nontender, Nondistended; Bowel sounds present, Bladder non distended, non palpable  EXTREMITIES: Pulses palpable radial pulses 2+ bilat, DP/PT 1+/1+ bilat, without clubbing,cyanosis. Digits of feet slight cool to touch with cap refill > 3 secs< 6 secs, digits of hands warm to touch with good cap refill < 3 sec. Lower extremities with 1+ pitting edema in RLE, 2+ pitting in LLE  Skin: warm, dry, intact, normal color, no rash or abnormal lesions,without palpable nodes      HOSPITAL MEDICATIONS:  MEDICATIONS  (STANDING):  azithromycin  IVPB 500 milliGRAM(s) IV Intermittent Once  furosemide   Injectable 40 milliGRAM(s) IV Push Once  piperacillin/tazobactam IVPB. 3.375 Gram(s) IV Intermittent once  vancomycin  IVPB 1000 milliGRAM(s) IV Intermittent once    MEDICATIONS  (PRN):      LABS:                        11.9   6.6   )-----------( 233      ( 04 Mar 2019 11:40 )             37.1     Hgb Trend: 11.9<--  03-04    144  |  99  |  47<H>  ----------------------------<  269<H>  5.1   |  31  |  1.39<H>    Ca    9.9      04 Mar 2019 11:40    TPro  7.4  /  Alb  3.6  /  TBili  0.7  /  DBili  x   /  AST  36  /  ALT  19  /  AlkPhos  106  03-04    Creatinine Trend: 1.39<--  PT/INR - ( 04 Mar 2019 11:40 )   PT: 11.9 sec;   INR: 1.03 ratio         PTT - ( 04 Mar 2019 11:40 )  PTT:27.5 sec      Venous Blood Gas:  03-04 @ 11:39  7.21/104/36/40/41  VBG Lactate: 2.7      MICROBIOLOGY:     RADIOLOGY:  [ ] Reviewed and interpreted by me    EKG:

## 2019-03-04 NOTE — ED PROVIDER NOTE - PHYSICAL EXAMINATION
*Gen: weak, frail, follows directions, not responsive to questioning  *HEENT: NC/AT, PERRL, dry mucous membranes, airway patent, trachea midline  *CV: S1/S2 present, no murmurs  *Resp: b/l rales and dimished breath sounds  *Abd: non-distended, soft N/Tx4, no guarding or rigidity  *Neuro: unable to evaluate given mental status; patient does have directed movement of her extremities on command  *Extremities: no gross deformity  *Skin: no rashes, no wounds   ~ Mayra Gallego M.D.

## 2019-03-04 NOTE — ED ADULT NURSE REASSESSMENT NOTE - NS ED NURSE REASSESS COMMENT FT1
16 Fr Sawant catheter inserted per order, done under sterile technique by RN with supervision by additional RN. Sawant draining clear, yellow urine to gravity. Pt awaiting transport to CT scan at this time.

## 2019-03-04 NOTE — CONSULT NOTE ADULT - ASSESSMENT
Assessment:    Plan:    #Neuro:  -neuro checks q 4 hrs and prn for changes    #Pulm:  -pt presents with progressive sob with lethargy over past 3 to 4 days with development of productive cough over past 1 to 2 days  -found to have hypercapnic resp failure on VBG with ph 7.21; vPCO2 102  -bipap therapy - titrate to ph 7.35-7.45; PCO2 35-45; SPO2 > 90%  -duoneb therapy q 6 hrs  -pt on home albuterol neb therapy q 12 hrs  -continue albuterol neb therapy q 12 hrs and prn sob/wheezes  -HOB >/= 30 degree angle  -chest PT q 2 hrs  -CXR with Lt pleural effusion, atelectasis, RUL atelectasis ? RLL infiltrate I  -obtain CT chest    #CV:  -pt with Hx of CHF (EF from 2015 62%, increased LE edema Lt>Rt, treated initially with lasix, changed to spironolactone last week  -obtain ECG now and q day x 3 days  -obtain cardiac enzymes now and q 8 hrs x3  -obtain duplex of lower extremities to r/o DVT (as above)  -pt with hx of afib on dig  -obtain dig level  -obtain TTE to eval progression of heart disease  #GI/:    #ID:  -pan culture  -obtain RVP   -obtain urine legionella  -zosyn 3.375 gm q 8 hrs   -vanco 1 gm IV qd  -zithromax 500 mg IV qday    #FEN/ENDO/HEME: Assessment:    Plan:    #Neuro:  -neuro checks q 4 hrs and prn for changes    #Pulm:  -pt presents with progressive sob with lethargy over past 3 to 4 days with development of productive cough over past 1 to 2 days  -found to have hypercapnic resp failure on VBG with ph 7.21; vPCO2 102  -bipap therapy - titrate to ph 7.35-7.45; PCO2 35-45; SPO2 > 90%  -duoneb therapy q 6 hrs  -pt on home albuterol neb therapy q 12 hrs  -continue albuterol neb therapy q 12 hrs and prn sob/wheezes  -HOB >/= 30 degree angle  -chest PT q 2 hrs  -CXR with Lt pleural effusion, atelectasis, RUL atelectasis ? RLL infiltrate I  -obtain CT chest    #CV:  -pt with Hx of CHF (EF from 2015 62%, increased LE edema Lt>Rt, treated initially with lasix, changed to spironolactone last week  -obtain ECG now and q day x 3 days  -obtain cardiac enzymes now and q 8 hrs x3  -obtain duplex of lower extremities to r/o DVT (as above)  -pt with hx of afib on dig  -obtain dig level  -obtain TTE to eval progression of heart disease  -given elevated BNP - continue lasix 40 mg IV q 12 hrs - keep output 500 cc/hr  #GI/:    #ID:  -pan culture  -obtain RVP   -obtain urine legionella  -zosyn 3.375 gm q 8 hrs   -vanco 1 gm IV qd  -zithromax 500 mg IV qday    #FEN/ENDO/HEME: Assessment:    Plan:    #Neuro:  -neuro checks q 4 hrs and prn for changes  -bedrest at present  -physical therapy consult when stable    #Pulm:  -pt presents with progressive sob with lethargy over past 3 to 4 days with development of productive cough over past 1 to 2 days  -found to have hypercapnic resp failure on VBG with ph 7.21; vPCO2 102  -bipap therapy - titrate to ph 7.35-7.45; PCO2 35-45; SPO2 > 90%  -duoneb therapy q 6 hrs  -pt on home albuterol neb therapy q 12 hrs  -continue albuterol neb therapy q 12 hrs and prn sob/wheezes  -HOB >/= 30 degree angle  -chest PT q 2 hrs  -CXR with Lt pleural effusion, atelectasis, RUL atelectasis ? RLL infiltrate with platelike effusion   -consider bronchoscopy  -obtain CT chest    #CV:  -pt with Hx of CHF (EF from 2015 62%, increased LE edema Lt>Rt, treated initially with lasix, changed to spironolactone last week  -obtain ECG now and q day x 3 days  -obtain cardiac enzymes now and q 8 hrs x3  -obtain duplex of lower extremities to r/o DVT (as above)  -pt with hx of afib on dig  -obtain dig level  -obtain TTE to eval progression of heart disease  -given elevated BNP - continue lasix 40 mg IV q 12 hrs - keep output 500 cc/hr    #GI/:  -NPO at present while on bipap therapy  -dietician consult  -while NPO - protonix 40 mg IV qd  -strict I & O's keep even to neg 500 cc's  -place azalia    #ID:  -pan culture  -obtain RVP   -obtain urine legionella  -zosyn 3.375 gm q 8 hrs   -vanco 1 gm IV qd  -zithromax 500 mg IV qday    #FEN/ENDO/HEME: Assessment:  89 yr old female with PMHx of alzhiemer, HTN, Afib, THN, CHF (last documented EF from 2015 of 62%), Asthma, cholecystectomy who presents to ED this morning from home after developing progressive SOB with lethargy over 3 to 4 days with development of productive cough over past 24 hr. Family endorses pt with increase lower extremity edema with Lt>Rt over past few weeks initially treated with lasix changed to spironolactone last week.        In E.D. pt found to have Vph 7.21, vPCO2 104, placed on bipap therapy, [place] [address]. Pt received lasix 40 mg IVP, started on vanco/zosyn/zithromax.          Consult called for hypercapnic resp failure 2/2 to r/o CAP vs ADHF    Plan:    #Neuro:  -neuro checks q 4 hrs and prn for changes  -bedrest at present  -physical therapy consult when stable    #Pulm:  -pt presents with progressive sob with lethargy over past 3 to 4 days with development of productive cough over past 1 to 2 days  -found to have hypercapnic resp failure on VBG with ph 7.21; vPCO2 102  -bipap therapy - titrate to ph 7.35-7.45; PCO2 35-45; SPO2 > 90%  -duoneb therapy q 6 hrs  -pt on home albuterol neb therapy q 12 hrs  -continue albuterol neb therapy q 12 hrs and prn sob/wheezes  -HOB >/= 30 degree angle  -chest PT q 2 hrs  -CXR with Lt pleural effusion, atelectasis, RUL atelectasis ? RLL infiltrate with platelike effusion   -consider bronchoscopy  -obtain CT chest    #CV:  -pt with Hx of CHF (EF from 2015 62%, increased LE edema Lt>Rt, treated initially with lasix, changed to spironolactone last week  -obtain ECG now and q day x 3 days  -obtain cardiac enzymes now and q 8 hrs x3  -obtain duplex of lower extremities to r/o DVT (as above)  -pt with hx of afib on dig  -obtain dig level  -obtain TTE to eval progression of heart disease  -given elevated BNP - continue lasix 40 mg IV q 12 hrs - keep output 500 cc/hr    #GI/:  -NPO at present while on bipap therapy  -dietician consult  -while NPO - protonix 40 mg IV qd  -Pt with Hx of VANIA/CKD - currently with sCr 1.39  -strict I & O's keep even to neg 500 cc's  -place azalia    #ID:  -pan culture  -obtain RVP   -obtain urine legionella  -zosyn 3.375 gm q 8 hrs   -vanco 1 gm IV qd  -zithromax 500 mg IV qday    #FEN/ENDO/HEME:  -cmp/mg++/po--4/cbc with diff/coags now and q day  -abg prn  -Pt with hx of DM2 on oral hypoglycemics  -POC glucose q 4 hrs with insulin sliding scale - maintain glucose 140 to 160

## 2019-03-04 NOTE — H&P ADULT - ATTENDING COMMENTS
Alzheimer disease dependent on all ADLs with resp failure in the setting of severe bronchiectasis (h/o THERESE sp tx?) and large left effusion. No fever/ WBC count making infection less likely.    1. Resp failure - cont ETT  2. ? pna given cough - empiric abx with de-escalation as possible  3. CHF - elevated BNP but no JVD - maintain euvolemia      cc> 40 minutes in conjunction with housestaff

## 2019-03-05 ENCOUNTER — RESULT REVIEW (OUTPATIENT)
Age: 84
End: 2019-03-05

## 2019-03-05 LAB
-  COAGULASE NEGATIVE STAPHYLOCOCCUS: SIGNIFICANT CHANGE UP
-  STREPTOCOCCUS SP. (NOT GRP A, B OR S PNEUMONIAE): SIGNIFICANT CHANGE UP
ALBUMIN SERPL ELPH-MCNC: 2.6 G/DL — LOW (ref 3.3–5)
ALP SERPL-CCNC: 83 U/L — SIGNIFICANT CHANGE UP (ref 40–120)
ALT FLD-CCNC: 14 U/L — SIGNIFICANT CHANGE UP (ref 10–45)
ANION GAP SERPL CALC-SCNC: 14 MMOL/L — SIGNIFICANT CHANGE UP (ref 5–17)
APTT BLD: 32.2 SEC — SIGNIFICANT CHANGE UP (ref 27.5–36.3)
APTT BLD: 33.7 SEC — SIGNIFICANT CHANGE UP (ref 27.5–36.3)
AST SERPL-CCNC: 29 U/L — SIGNIFICANT CHANGE UP (ref 10–40)
BASOPHILS # BLD AUTO: 0 K/UL — SIGNIFICANT CHANGE UP (ref 0–0.2)
BASOPHILS NFR BLD AUTO: 0.7 % — SIGNIFICANT CHANGE UP (ref 0–2)
BILIRUB SERPL-MCNC: 0.5 MG/DL — SIGNIFICANT CHANGE UP (ref 0.2–1.2)
BUN SERPL-MCNC: 50 MG/DL — HIGH (ref 7–23)
CALCIUM SERPL-MCNC: 9.2 MG/DL — SIGNIFICANT CHANGE UP (ref 8.4–10.5)
CHLORIDE SERPL-SCNC: 99 MMOL/L — SIGNIFICANT CHANGE UP (ref 96–108)
CK MB CFR SERPL CALC: 3.2 NG/ML — SIGNIFICANT CHANGE UP (ref 0–3.8)
CO2 SERPL-SCNC: 29 MMOL/L — SIGNIFICANT CHANGE UP (ref 22–31)
CREAT SERPL-MCNC: 1.45 MG/DL — HIGH (ref 0.5–1.3)
CULTURE RESULTS: NO GROWTH — SIGNIFICANT CHANGE UP
ENTEROCOC DNA BLD POS QL NAA+NON-PROBE: SIGNIFICANT CHANGE UP
EOSINOPHIL # BLD AUTO: 0 K/UL — SIGNIFICANT CHANGE UP (ref 0–0.5)
EOSINOPHIL NFR BLD AUTO: 0.3 % — SIGNIFICANT CHANGE UP (ref 0–6)
GAS PNL BLDA: SIGNIFICANT CHANGE UP
GAS PNL BLDA: SIGNIFICANT CHANGE UP
GLUCOSE SERPL-MCNC: 124 MG/DL — HIGH (ref 70–99)
GRAM STN FLD: SIGNIFICANT CHANGE UP
GRAM STN FLD: SIGNIFICANT CHANGE UP
HBA1C BLD-MCNC: 8.7 % — HIGH (ref 4–5.6)
HCT VFR BLD CALC: 32.8 % — LOW (ref 34.5–45)
HGB BLD-MCNC: 11.2 G/DL — LOW (ref 11.5–15.5)
INR BLD: 0.96 RATIO — SIGNIFICANT CHANGE UP (ref 0.88–1.16)
INR BLD: 1 RATIO — SIGNIFICANT CHANGE UP (ref 0.88–1.16)
INR BLD: 1.11 RATIO — SIGNIFICANT CHANGE UP (ref 0.88–1.16)
LEGIONELLA AG UR QL: NEGATIVE — SIGNIFICANT CHANGE UP
LYMPHOCYTES # BLD AUTO: 0.7 K/UL — LOW (ref 1–3.3)
LYMPHOCYTES # BLD AUTO: 12.2 % — LOW (ref 13–44)
MAGNESIUM SERPL-MCNC: 2.1 MG/DL — SIGNIFICANT CHANGE UP (ref 1.6–2.6)
MCHC RBC-ENTMCNC: 33.7 PG — SIGNIFICANT CHANGE UP (ref 27–34)
MCHC RBC-ENTMCNC: 34 GM/DL — SIGNIFICANT CHANGE UP (ref 32–36)
MCV RBC AUTO: 99 FL — SIGNIFICANT CHANGE UP (ref 80–100)
METHOD TYPE: SIGNIFICANT CHANGE UP
MONOCYTES # BLD AUTO: 0.6 K/UL — SIGNIFICANT CHANGE UP (ref 0–0.9)
MONOCYTES NFR BLD AUTO: 11.3 % — SIGNIFICANT CHANGE UP (ref 2–14)
NEUTROPHILS # BLD AUTO: 4.1 K/UL — SIGNIFICANT CHANGE UP (ref 1.8–7.4)
NEUTROPHILS NFR BLD AUTO: 75.5 % — SIGNIFICANT CHANGE UP (ref 43–77)
OSMOLALITY UR: 357 MOS/KG — SIGNIFICANT CHANGE UP (ref 300–900)
PHOSPHATE SERPL-MCNC: 3.1 MG/DL — SIGNIFICANT CHANGE UP (ref 2.5–4.5)
PLATELET # BLD AUTO: 100 K/UL — LOW (ref 150–400)
POTASSIUM SERPL-MCNC: 4.4 MMOL/L — SIGNIFICANT CHANGE UP (ref 3.5–5.3)
POTASSIUM SERPL-SCNC: 4.4 MMOL/L — SIGNIFICANT CHANGE UP (ref 3.5–5.3)
PROT SERPL-MCNC: 5.8 G/DL — LOW (ref 6–8.3)
PROTHROM AB SERPL-ACNC: 11 SEC — SIGNIFICANT CHANGE UP (ref 10–12.9)
PROTHROM AB SERPL-ACNC: 11.5 SEC — SIGNIFICANT CHANGE UP (ref 10–12.9)
PROTHROM AB SERPL-ACNC: 12.8 SEC — SIGNIFICANT CHANGE UP (ref 10–12.9)
RBC # BLD: 3.32 M/UL — LOW (ref 3.8–5.2)
RBC # FLD: 15.4 % — HIGH (ref 10.3–14.5)
SODIUM SERPL-SCNC: 142 MMOL/L — SIGNIFICANT CHANGE UP (ref 135–145)
SPECIMEN SOURCE: SIGNIFICANT CHANGE UP
TROPONIN T, HIGH SENSITIVITY RESULT: 66 NG/L — HIGH (ref 0–51)
TROPONIN T, HIGH SENSITIVITY RESULT: 69 NG/L — HIGH (ref 0–51)
UUN UR-MCNC: 338 MG/DL — SIGNIFICANT CHANGE UP
WBC # BLD: 5.4 K/UL — SIGNIFICANT CHANGE UP (ref 3.8–10.5)
WBC # FLD AUTO: 5.4 K/UL — SIGNIFICANT CHANGE UP (ref 3.8–10.5)

## 2019-03-05 PROCEDURE — 99291 CRITICAL CARE FIRST HOUR: CPT

## 2019-03-05 PROCEDURE — 93970 EXTREMITY STUDY: CPT | Mod: 26

## 2019-03-05 RX ORDER — SODIUM CHLORIDE 9 MG/ML
250 INJECTION, SOLUTION INTRAVENOUS ONCE
Qty: 0 | Refills: 0 | Status: COMPLETED | OUTPATIENT
Start: 2019-03-05 | End: 2019-03-05

## 2019-03-05 RX ORDER — MIDAZOLAM HYDROCHLORIDE 1 MG/ML
0.5 INJECTION, SOLUTION INTRAMUSCULAR; INTRAVENOUS ONCE
Qty: 0 | Refills: 0 | Status: DISCONTINUED | OUTPATIENT
Start: 2019-03-05 | End: 2019-03-05

## 2019-03-05 RX ORDER — SODIUM CHLORIDE 9 MG/ML
1000 INJECTION, SOLUTION INTRAVENOUS
Qty: 0 | Refills: 0 | Status: DISCONTINUED | OUTPATIENT
Start: 2019-03-05 | End: 2019-03-07

## 2019-03-05 RX ORDER — MIDAZOLAM HYDROCHLORIDE 1 MG/ML
1 INJECTION, SOLUTION INTRAMUSCULAR; INTRAVENOUS ONCE
Qty: 0 | Refills: 0 | Status: DISCONTINUED | OUTPATIENT
Start: 2019-03-05 | End: 2019-03-05

## 2019-03-05 RX ORDER — SENNA PLUS 8.6 MG/1
10 TABLET ORAL AT BEDTIME
Qty: 0 | Refills: 0 | Status: DISCONTINUED | OUTPATIENT
Start: 2019-03-05 | End: 2019-03-18

## 2019-03-05 RX ORDER — DEXMEDETOMIDINE HYDROCHLORIDE IN 0.9% SODIUM CHLORIDE 4 UG/ML
0.2 INJECTION INTRAVENOUS
Qty: 200 | Refills: 0 | Status: DISCONTINUED | OUTPATIENT
Start: 2019-03-05 | End: 2019-03-07

## 2019-03-05 RX ORDER — FENTANYL CITRATE 50 UG/ML
12.5 INJECTION INTRAVENOUS ONCE
Qty: 0 | Refills: 0 | Status: DISCONTINUED | OUTPATIENT
Start: 2019-03-05 | End: 2019-03-05

## 2019-03-05 RX ORDER — FENTANYL CITRATE 50 UG/ML
0.5 INJECTION INTRAVENOUS
Qty: 5000 | Refills: 0 | Status: DISCONTINUED | OUTPATIENT
Start: 2019-03-05 | End: 2019-03-05

## 2019-03-05 RX ORDER — POLYETHYLENE GLYCOL 3350 17 G/17G
17 POWDER, FOR SOLUTION ORAL DAILY
Qty: 0 | Refills: 0 | Status: DISCONTINUED | OUTPATIENT
Start: 2019-03-05 | End: 2019-03-17

## 2019-03-05 RX ADMIN — Medication 3 MILLILITER(S): at 18:15

## 2019-03-05 RX ADMIN — SODIUM CHLORIDE 500 MILLILITER(S): 9 INJECTION, SOLUTION INTRAVENOUS at 11:30

## 2019-03-05 RX ADMIN — SODIUM CHLORIDE 100 MILLILITER(S): 9 INJECTION, SOLUTION INTRAVENOUS at 11:59

## 2019-03-05 RX ADMIN — Medication 81 MILLIGRAM(S): at 12:19

## 2019-03-05 RX ADMIN — HEPARIN SODIUM 5000 UNIT(S): 5000 INJECTION INTRAVENOUS; SUBCUTANEOUS at 18:09

## 2019-03-05 RX ADMIN — AZITHROMYCIN 250 MILLIGRAM(S): 500 TABLET, FILM COATED ORAL at 15:37

## 2019-03-05 RX ADMIN — PIPERACILLIN AND TAZOBACTAM 25 GRAM(S): 4; .5 INJECTION, POWDER, LYOPHILIZED, FOR SOLUTION INTRAVENOUS at 02:12

## 2019-03-05 RX ADMIN — DEXMEDETOMIDINE HYDROCHLORIDE IN 0.9% SODIUM CHLORIDE 2.38 MICROGRAM(S)/KG/HR: 4 INJECTION INTRAVENOUS at 08:30

## 2019-03-05 RX ADMIN — FENTANYL CITRATE 12.5 MICROGRAM(S): 50 INJECTION INTRAVENOUS at 20:12

## 2019-03-05 RX ADMIN — MIDAZOLAM HYDROCHLORIDE 0.5 MILLIGRAM(S): 1 INJECTION, SOLUTION INTRAMUSCULAR; INTRAVENOUS at 20:10

## 2019-03-05 RX ADMIN — Medication 3 MILLILITER(S): at 00:11

## 2019-03-05 RX ADMIN — FENTANYL CITRATE 12.5 MICROGRAM(S): 50 INJECTION INTRAVENOUS at 07:05

## 2019-03-05 RX ADMIN — FENTANYL CITRATE 12.5 MICROGRAM(S): 50 INJECTION INTRAVENOUS at 17:00

## 2019-03-05 RX ADMIN — FENTANYL CITRATE 12.5 MICROGRAM(S): 50 INJECTION INTRAVENOUS at 17:30

## 2019-03-05 RX ADMIN — FENTANYL CITRATE 12.5 MICROGRAM(S): 50 INJECTION INTRAVENOUS at 19:45

## 2019-03-05 RX ADMIN — Medication 2: at 22:59

## 2019-03-05 RX ADMIN — HEPARIN SODIUM 5000 UNIT(S): 5000 INJECTION INTRAVENOUS; SUBCUTANEOUS at 05:35

## 2019-03-05 RX ADMIN — FENTANYL CITRATE 12.5 MICROGRAM(S): 50 INJECTION INTRAVENOUS at 23:05

## 2019-03-05 RX ADMIN — Medication 3 MILLILITER(S): at 12:40

## 2019-03-05 RX ADMIN — SENNA PLUS 10 MILLILITER(S): 8.6 TABLET ORAL at 23:00

## 2019-03-05 RX ADMIN — FENTANYL CITRATE 12.5 MICROGRAM(S): 50 INJECTION INTRAVENOUS at 23:45

## 2019-03-05 RX ADMIN — PIPERACILLIN AND TAZOBACTAM 25 GRAM(S): 4; .5 INJECTION, POWDER, LYOPHILIZED, FOR SOLUTION INTRAVENOUS at 18:00

## 2019-03-05 RX ADMIN — Medication 250 MILLIGRAM(S): at 00:45

## 2019-03-05 RX ADMIN — MIDAZOLAM HYDROCHLORIDE 0.5 MILLIGRAM(S): 1 INJECTION, SOLUTION INTRAMUSCULAR; INTRAVENOUS at 23:15

## 2019-03-05 RX ADMIN — Medication 3 MILLILITER(S): at 05:51

## 2019-03-05 RX ADMIN — FENTANYL CITRATE 12.5 MICROGRAM(S): 50 INJECTION INTRAVENOUS at 08:45

## 2019-03-05 NOTE — PROGRESS NOTE ADULT - ASSESSMENT
89 yr old female with PMHx of alzhiemer, HTN, Afib, HTN, CHF ( EF from 2015 of 62%), Asthma, cholecystectomy who presents to ED 3/4/19 from home after developing progressive SOB with lethargy over 3 to 4 days with development of productive cough over 1 day period. found to have hypercapnic resp failure requiring intubation and mechanical vent therapy. Pt admitted  for hypercapnic resp failure 2/2 to r/o CAP vs ADHF    Plan:    #Neuro:  -neuro checks q 4 hrs and prn for changes  -bedrest at present  -physical therapy consult when stable    #Pulm:  -Hypercapnic resp failure  -mechanical vent therapy - titrate to ph 7.35-7.45; PCO2 35-45; SPO2 > 92%  -duoneb therapy q 6 hrs  -pt on home albuterol neb therapy q 12 hrs  -continue albuterol neb therapy q 12 hrs and prn sob/wheezes  -HOB >/= 30 degree angle  -chest PT q 2 hrs    #CV:  -pt with Hx of CHF (EF from 2015 62%, increased LE edema Lt>Rt, treated initially with lasix, changed to spironolactone last week  -obtain ECG now and q day x 3 days  -obtain cardiac enzymes now and q 8 hrs x3  -obtain duplex of lower extremities to r/o DVT (as above)  -pt with hx of afib on dig  -obtain dig level  -obtain TTE to eval progression of heart disease  -given elevated BNP - continue lasix 40 mg IV q 12 hrs - keep output 500 cc/hr    #GI/:  -NPO at present while on bipap therapy  -dietician consult  -while NPO - protonix 40 mg IV qd  -Pt with Hx of VANIA/CKD - currently with sCr 1.39  -strict I & O's keep even to neg 500 cc's  -place vieyra    #ID:  -pan culture  -obtain RVP   -obtain urine legionella  -zosyn 3.375 gm q 8 hrs   -vanco 1 gm IV qd  -zithromax 500 mg IV qday    #FEN/ENDO/HEME:  -cmp/mg++/po--4/cbc with diff/coags now and q day  -abg prn  -Pt with hx of DM2 on oral hypoglycemics  -POC glucose q 4 hrs with insulin sliding scale - maintain glucose 140 to 160 89 yr old female with PMHx of alzhiemer, HTN, Afib, HTN, CHF ( EF from 2015 of 62%), Asthma, cholecystectomy who presents to ED 3/4/19 from home after developing progressive SOB with lethargy over 3 to 4 days with development of productive cough over 1 day period. found to have hypercapnic resp failure requiring intubation and mechanical vent therapy. Pt admitted  for hypercapnic resp failure 2/2 to r/o CAP vs ADHF    Plan:    #Neuro:  -neuro checks q 2 hrs and prn for changes  -bedrest at present  -physical therapy consult when stable    #Pulm:  -Hypercapnic resp failure  -mechanical vent therapy - titrate to ph 7.35-7.45; PCO2 35-45; SPO2 > 92%  -duoneb therapy q 6 hrs  -pt on home albuterol neb therapy q 12 hrs  -continue albuterol neb therapy q 12 hrs and prn sob/wheezes  -HOB >/= 30 degree angle  -chest PT q 2 hrs    #CV:  -pt with Hx of CHF (EF from 2015 62%, increased LE edema Lt>Rt, treated initially with lasix, changed to spironolactone last week  -obtain ECG  q day x 3 days  -obtain cardiac enzymes  q 8 hrs x3  -obtain duplex of lower extremities to r/o DVT (LE edema Lt>Rt)  -pt with hx of afib on dig  -obtain dig level  -obtain TTE to eval progression of heart disease  -given elevated BNP - continue lasix 40 mg IV q 12 hrs - keep output 500 cc/hr    #GI/:  -NPO at present while on bipap therapy  -dietician consult  -while NPO - protonix 40 mg IV qd  -Pt with Hx of VANIA/CKD - currently with sCr 1.39  -strict I & O's keep even to neg 500 cc's  -place vieyra    #ID:  -F/U cultures  -F/U urine legionella  -zosyn 3.375 gm q 8 hrs   -vanco 1 gm IV qd  -zithromax 500 mg IV qday    #FEN/ENDO/HEME:  -cmp/mg++/po--4/cbc with diff/coags now and q day  -abg prn  -Pt with hx of DM2 on oral hypoglycemics  -POC glucose q 4 hrs with insulin sliding scale - maintain glucose 140 to 160 89 yr old female with PMHx of alzhiemer, HTN, Afib, HTN, CHF ( EF from 2015 of 62%), Asthma, cholecystectomy who presents to ED 3/4/19 from home after developing progressive SOB with lethargy over 3 to 4 days with development of productive cough over 1 day period. found to have hypercapnic resp failure requiring intubation and mechanical vent therapy. Pt admitted  for hypercapnic resp failure 2/2 to r/o CAP vs ADHF    Plan:    #Neuro:  -neuro checks q 2 hrs and prn for changes  -bedrest at present  -physical therapy consult when stable  -pain/sedation with fentanyl 12.5 mcg IV q 4 hrs prn  -precedex gtt titrate to RASS 0 to -2    #Pulm:  -Hypercapnic resp failure  -mechanical vent therapy - titrate to ph 7.35-7.45; PCO2 35-45; SPO2 > 92%  -duoneb therapy q 6 hrs  -pt on home albuterol neb therapy q 12 hrs  -continue albuterol neb therapy q 12 hrs and prn sob/wheezes  -HOB >/= 30 degree angle  -chest PT q 2 hrs  -consider thoracentesis of Lt lung    #CV:  -pt with Hx of CHF (EF from 2015 62%, increased LE edema Lt>Rt, treated initially with lasix, changed to spironolactone last week  -obtain ECG  q day x 3 days  -obtain cardiac enzymes  q 8 hrs x3  -obtain duplex of lower extremities to r/o DVT (LE edema Lt>Rt)  -pt with hx of afib on dig  -obtain dig level  -consider heparin gtt  -obtain TTE to eval progression of heart disease  -given elevated BNP - continue lasix 40 mg IV q 12 hrs - keep output 500 cc/hr      #GI/:  -NPO at present while on bipap therapy  -dietician consult  -while NPO - protonix 40 mg IV qd  -Pt with Hx of VANIA/CKD - currently with sCr 1.45  -strict I & O's keep even       #ID:  -F/U cultures  -F/U urine legionella  -zosyn 3.375 gm q 8 hrs   -vanco 1 gm IV qd  -zithromax 500 mg IV qday    #FEN/ENDO/HEME:  -cmp/mg++/po--4/cbc with diff/coags now and q day  -abg prn  -Pt with hx of DM2 on oral hypoglycemics  -POC glucose q 4 hrs with insulin sliding scale - maintain glucose 140 to 160  -as effacement noted, and sCr elevated -would give LR at 75 cc/hr x 10 hrs 89 yr old female with PMHx of alzhiemer, HTN, Afib, HTN, CHF ( EF from 2015 of 62%), Asthma, cholecystectomy who presents to ED 3/4/19 from home after developing progressive SOB with lethargy over 3 to 4 days with development of productive cough over 1 day period. found to have hypercapnic resp failure requiring intubation and mechanical vent therapy. Pt admitted  for hypercapnic resp failure 2/2 to r/o CAP vs ADHF    Plan:    #Neuro:  -neuro checks q 2 hrs and prn for changes  -bedrest at present  -physical therapy consult when stable  -pain/sedation with fentanyl 12.5 mcg IV q 4 hrs prn  -precedex gtt titrate to RASS 0 to -2    #Pulm:  -Hypercapnic resp failure  -mechanical vent therapy - titrate to ph 7.35-7.45; PCO2 35-45; SPO2 > 92%  -duoneb therapy q 6 hrs  -pt on home albuterol neb therapy q 12 hrs  -continue albuterol neb therapy q 12 hrs and prn sob/wheezes  -HOB >/= 30 degree angle  -chest PT q 2 hrs  -consider thoracentesis of Lt lung    #CV:  -pt with Hx of CHF (EF from 2015 62%, increased LE edema Lt>Rt, treated initially with lasix, changed to spironolactone last week  -obtain ECG  q day x 3 days  -obtain cardiac enzymes  q 8 hrs x3  -obtain duplex of lower extremities to r/o DVT (LE edema Lt>Rt)  -pt with hx of afib on dig  -obtain dig level  -consider heparin gtt  -obtain TTE to eval progression of heart disease  -given elevated BNP - continue lasix 40 mg IV q 12 hrs - keep output 500 cc/hr      #GI/:  -NPO at present while on bipap therapy  -dietician consult  -while NPO - protonix 40 mg IV qd  -Pt with Hx of VANIA/CKD - currently with sCr 1.45  -strict I & O's keep even       #ID:  -Combi cath bronch cultures 3/4 coag neg staph/ enterococcus species/strep non Group A,B or Strep pneumoniae  -F/U urine legionella  -zosyn 3.375 gm q 8 hrs   -vanco 1 gm IV qd  -zithromax 500 mg IV qday    #FEN/ENDO/HEME:  -cmp/mg++/po--4/cbc with diff/coags now and q day  -abg prn  -Pt with hx of DM2 on oral hypoglycemics  -POC glucose q 4 hrs with insulin sliding scale - maintain glucose 140 to 160  -as effacement noted, and sCr elevated -would give LR at 75 cc/hr x 10 hrs

## 2019-03-05 NOTE — PROGRESS NOTE ADULT - ATTENDING COMMENTS
1. resp failure likely sec to large left effusion (low lung volumes resulting on SBT) - need thoracentesis but family unsure re consent.   2. ? PNA less likely but given 'rare gpc in pairs' from ETT and 1/4 stap epi on cx (likely contaminant) will treat with 5 ds  3. CHF - no evidence of pulm edema and no JVD + sclerotic and immobile appearing mitral leaflet -> formal echo to evaluate  4. hypotension this am with likely sedation and hypovolemia after diuresis -  >1 L LR    cc time 40 mns in conjunction with the housestaff

## 2019-03-05 NOTE — PROGRESS NOTE ADULT - SUBJECTIVE AND OBJECTIVE BOX
CHIEF COMPLAINT:  Patient is a 89y old  Female who presents with a chief complaint of hypercapnic resp failure (04 Mar 2019 17:42)    HPI:  89 yr old female with PMHx of alzhiemer, HTN, Afib, THN, CHF (last documented EF from  of 62%), Asthma, cholecystectomy who presents to ED 3/4/19  from home after developing progressive SOB with lethargy over 3 to 4 days with development of productive cough over past 24 hr. Family endorses pt with increase lower extremity edema with Lt>Rt over past few weeks initially treated with lasix changed to spironolactone last week.        In E.D. pt found to have Vph 7.21, vPCO2 104, placed on bipap therapy, [place] [address]. Pt received lasix 40 mg IVP, started on vanco/zosyn/zithromax.          Consult called for hypercapnic resp failure 2/ to r/o CAP vs ADHF        Interval Events:    REVIEW OF SYSTEMS:    CONSTITUTIONAL:  EYES/ENT:   NECK:   RESPIRATORY:   CARDIOVASCULAR:   GASTROINTESTINAL:   GENITOURINARY:   NEUROLOGICAL:   SKIN:       OBJECTIVE:  ICU Vital Signs Last 24 Hrs  T(C): 37.1 (05 Mar 2019 00:00), Max: 37.1 (05 Mar 2019 00:00)  T(F): 98.8 (05 Mar 2019 00:00), Max: 98.8 (05 Mar 2019 00:00)  HR: 64 (05 Mar 2019 04:13) (46 - 66)  BP: 111/44 (05 Mar 2019 01:00) (71/35 - 162/111)  BP(mean): 64 (05 Mar 2019 01:00) (48 - 85)  ABP: --  ABP(mean): --  RR: 23 (05 Mar 2019 01:00) (15 - 27)  SpO2: 100% (05 Mar 2019 04:13) (86% - 100%)    Mode: AC/ CMV (Assist Control/ Continuous Mandatory Ventilation), RR (machine): 16, TV (machine): 300, FiO2: 40, PEEP: 5, ITime: 1, MAP: 12, PIP: 30    03-04 @ 07:01  -  03-05 @ 05:20  --------------------------------------------------------  IN: 25.4 mL / OUT: 110 mL / NET: -84.6 mL      CAPILLARY BLOOD GLUCOSE          PHYSICAL EXAM:          HOSPITAL MEDICATIONS:  MEDICATIONS  (STANDING):  ALBUTerol/ipratropium for Nebulization 3 milliLiter(s) Nebulizer every 6 hours  aspirin  chewable 81 milliGRAM(s) Oral daily  azithromycin  IVPB 500 milliGRAM(s) IV Intermittent every 24 hours  dextrose 5%. 1000 milliLiter(s) (50 mL/Hr) IV Continuous <Continuous>  dextrose 50% Injectable 12.5 Gram(s) IV Push once  dextrose 50% Injectable 25 Gram(s) IV Push once  dextrose 50% Injectable 25 Gram(s) IV Push once  heparin  Injectable 5000 Unit(s) SubCutaneous every 12 hours  insulin lispro (HumaLOG) corrective regimen sliding scale   SubCutaneous every 6 hours  norepinephrine Infusion 0.05 MICROgram(s)/kG/Min (3.403 mL/Hr) IV Continuous <Continuous>  piperacillin/tazobactam IVPB. 3.375 Gram(s) IV Intermittent every 12 hours  vancomycin  IVPB 750 milliGRAM(s) IV Intermittent every 24 hours    MEDICATIONS  (PRN):  ALBUTerol    90 MICROgram(s) HFA Inhaler 2 Puff(s) Inhalation every 12 hours PRN asthma  dextrose 40% Gel 15 Gram(s) Oral once PRN Blood Glucose LESS THAN 70 milliGRAM(s)/deciliter  fentaNYL    Injectable 12.5 MICROGram(s) IV Push every 4 hours PRN agitation/pain  glucagon  Injectable 1 milliGRAM(s) IntraMuscular once PRN Glucose LESS THAN 70 milligrams/deciliter      LABS:                        10.1   5.5   )-----------( 178      ( 04 Mar 2019 17:35 )             30.7     Hgb Trend: 10.1<--, 11.9<--  03-05    142  |  99  |  50<H>  ----------------------------<  124<H>  4.4   |  29  |  1.45<H>    Ca    9.2      05 Mar 2019 02:54  Phos  3.1     03-05  Mg     2.1     03-05    TPro  5.8<L>  /  Alb  2.6<L>  /  TBili  0.5  /  DBili  x   /  AST  29  /  ALT  14  /  AlkPhos  83  03-05    LIVER FUNCTIONS - ( 05 Mar 2019 02:54 )  Alb: 2.6 g/dL / Pro: 5.8 g/dL / ALK PHOS: 83 U/L / ALT: 14 U/L / AST: 29 U/L / GGT: x           Creatinine Trend: 1.45<--, 1.52<--, 1.39<--  PT/INR - ( 05 Mar 2019 02:54 )   PT: See Note sec;   INR: See Note ratio         PTT - ( 04 Mar 2019 17:35 )  PTT:29.9 sec  Urinalysis Basic - ( 04 Mar 2019 15:12 )    Color: Yellow / Appearance: Clear / S.021 / pH: x  Gluc: x / Ketone: Negative  / Bili: Negative / Urobili: 2 mg/dL   Blood: x / Protein: 100 / Nitrite: Negative   Leuk Esterase: Negative / RBC: 2 /hpf / WBC 1 /HPF   Sq Epi: x / Non Sq Epi: 1 /hpf / Bacteria: Negative      Arterial Blood Gas:   @ 02:49  7.59/38/139/37/100/13.4  ABG lactate: --  Arterial Blood Gas:   @ 21:27  7.43/58/139/38/99/11.5  ABG lactate: --  Arterial Blood Gas:   @ 17:50  7.40/60/140/37/99/10.4  ABG lactate: --  Arterial Blood Gas:   @ 14:04  7.25/96/84/41/92/10.9  ABG lactate: --    Venous Blood Gas:   @ 11:39  7.21/104/36/40/41  VBG Lactate: 2.7      MICROBIOLOGY:     RADIOLOGY:  [ ] Reviewed and interpreted by me    EKG:      [facility]-BC (ext [id number]) CHIEF COMPLAINT:  Patient is a 89y old  Female who presents with a chief complaint of hypercapnic resp failure (04 Mar 2019 17:42)    HPI:  89 yr old female with PMHx of alzhiemer, HTN, Afib, THN, CHF (last documented EF from  of 62%), Asthma, cholecystectomy who presents to ED 3/4/19  from home after developing progressive SOB with lethargy over 3 to 4 days with development of productive cough over 1 day period. Family endorsed pt with increase lower extremity edema with Lt>Rt over past few weeks prior to presentation initially treated with lasix changed to spironolactone last week.        In E.D. pt found to have Vph 7.21, vPCO2 104, initially  placed on bipap therapy without improvement, eventually requiring intubation and mechanical vent therapy.             Pt admitted to MICU  for hypercapnic resp failure 2/2 to r/o CAP vs ADHF            Interval Events:    antibx re instituted    CT chest 3/4/19 demonstrated   Left pleural effusion and passive atelectasis of left lower lobe.       Right upper lobe volume loss and bronchiectasis with patchy opacities in the right upper lobe and to a lesser extent the right middle lobe and right lower lobe can be the sequelae of prior scarring/infection.       Small right pleural effusion.        REVIEW OF SYSTEMS:    Unable 2/2 to critical illness      OBJECTIVE:  ICU Vital Signs Last 24 Hrs  T(C): 37.1 (05 Mar 2019 00:00), Max: 37.1 (05 Mar 2019 00:00)  T(F): 98.8 (05 Mar 2019 00:00), Max: 98.8 (05 Mar 2019 00:00)  HR: 64 (05 Mar 2019 04:13) (46 - 66)  BP: 111/44 (05 Mar 2019 01:00) (71/35 - 162/111)  BP(mean): 64 (05 Mar 2019 01:00) (48 - 85)  ABP: --  ABP(mean): --  RR: 23 (05 Mar 2019 01:00) (15 - 27)  SpO2: 100% (05 Mar 2019 04:13) (86% - 100%)    Mode: AC/ CMV (Assist Control/ Continuous Mandatory Ventilation), RR (machine): 16, TV (machine): 300, FiO2: 40, PEEP: 5, ITime: 1, MAP: 12, PIP: 30    03-04 @ 07:01  -  03-05 @ 05:20  --------------------------------------------------------  IN: 25.4 mL / OUT: 110 mL / NET: -84.6 mL      CAPILLARY BLOOD GLUCOSE          PHYSICAL EXAM:          HOSPITAL MEDICATIONS:  MEDICATIONS  (STANDING):  ALBUTerol/ipratropium for Nebulization 3 milliLiter(s) Nebulizer every 6 hours  aspirin  chewable 81 milliGRAM(s) Oral daily  azithromycin  IVPB 500 milliGRAM(s) IV Intermittent every 24 hours  dextrose 5%. 1000 milliLiter(s) (50 mL/Hr) IV Continuous <Continuous>  dextrose 50% Injectable 12.5 Gram(s) IV Push once  dextrose 50% Injectable 25 Gram(s) IV Push once  dextrose 50% Injectable 25 Gram(s) IV Push once  heparin  Injectable 5000 Unit(s) SubCutaneous every 12 hours  insulin lispro (HumaLOG) corrective regimen sliding scale   SubCutaneous every 6 hours  norepinephrine Infusion 0.05 MICROgram(s)/kG/Min (3.403 mL/Hr) IV Continuous <Continuous>  piperacillin/tazobactam IVPB. 3.375 Gram(s) IV Intermittent every 12 hours  vancomycin  IVPB 750 milliGRAM(s) IV Intermittent every 24 hours    MEDICATIONS  (PRN):  ALBUTerol    90 MICROgram(s) HFA Inhaler 2 Puff(s) Inhalation every 12 hours PRN asthma  dextrose 40% Gel 15 Gram(s) Oral once PRN Blood Glucose LESS THAN 70 milliGRAM(s)/deciliter  fentaNYL    Injectable 12.5 MICROGram(s) IV Push every 4 hours PRN agitation/pain  glucagon  Injectable 1 milliGRAM(s) IntraMuscular once PRN Glucose LESS THAN 70 milligrams/deciliter      LABS:                        10.1   5.5   )-----------( 178      ( 04 Mar 2019 17:35 )             30.7     Hgb Trend: 10.1<--, 11.9<--      142  |  99  |  50<H>  ----------------------------<  124<H>  4.4   |  29  |  1.45<H>    Ca    9.2      05 Mar 2019 02:54  Phos  3.1     03-05  Mg     2.1     03-05    TPro  5.8<L>  /  Alb  2.6<L>  /  TBili  0.5  /  DBili  x   /  AST  29  /  ALT  14  /  AlkPhos  83  03-05    LIVER FUNCTIONS - ( 05 Mar 2019 02:54 )  Alb: 2.6 g/dL / Pro: 5.8 g/dL / ALK PHOS: 83 U/L / ALT: 14 U/L / AST: 29 U/L / GGT: x           Creatinine Trend: 1.45<--, 1.52<--, 1.39<--  PT/INR - ( 05 Mar 2019 02:54 )   PT: See Note sec;   INR: See Note ratio         PTT - ( 04 Mar 2019 17:35 )  PTT:29.9 sec  Urinalysis Basic - ( 04 Mar 2019 15:12 )    Color: Yellow / Appearance: Clear / S.021 / pH: x  Gluc: x / Ketone: Negative  / Bili: Negative / Urobili: 2 mg/dL   Blood: x / Protein: 100 / Nitrite: Negative   Leuk Esterase: Negative / RBC: 2 /hpf / WBC 1 /HPF   Sq Epi: x / Non Sq Epi: 1 /hpf / Bacteria: Negative      Arterial Blood Gas:   @ 02:49  7.59/38/139/37/100/13.4  ABG lactate: --  Arterial Blood Gas:   @ 21:27  7.43/58/139/38/99/11.5  ABG lactate: --  Arterial Blood Gas:   @ 17:50  7.40/60/140/37/99/10.4  ABG lactate: --  Arterial Blood Gas:   @ 14:04  7.25/96/84/41/92/10.9  ABG lactate: --    Venous Blood Gas:   @ 11:39  7.21/104/36/40/41  VBG Lactate: 2.7      MICROBIOLOGY:     RADIOLOGY:  [ ] Reviewed and interpreted by me    EKG:      [facility] (ext [phone]) CHIEF COMPLAINT:  Patient is a 89y old  Female who presents with a chief complaint of hypercapnic resp failure (04 Mar 2019 17:42)    HPI:  89 yr old female with PMHx of alzhiemer, HTN, Afib, THN, CHF (last documented EF from  of 62%), Asthma, cholecystectomy who presents to ED 3/4/19  from home after developing progressive SOB with lethargy over 3 to 4 days with development of productive cough over 1 day period. Family endorsed pt with increase lower extremity edema with Lt>Rt over past few weeks prior to presentation initially treated with lasix changed to spironolactone last week.        In E.D. pt found to have Vph 7.21, vPCO2 104, initially  placed on bipap therapy without improvement, eventually requiring intubation and mechanical vent therapy.             Pt admitted to MICU  for hypercapnic resp failure 2/2 to r/o CAP vs ADHF            Interval Events:    antibx re instituted    CT chest 3/4/19 demonstrated   Left pleural effusion and passive atelectasis of left lower lobe.       Right upper lobe volume loss and bronchiectasis with patchy opacities in the right upper lobe and to a lesser extent the right middle lobe and right lower lobe can be the sequelae of prior scarring/infection.       Small right pleural effusion.        REVIEW OF SYSTEMS:    Unable 2/2 to critical illness      OBJECTIVE:  ICU Vital Signs Last 24 Hrs  T(C): 37.1 (05 Mar 2019 00:00), Max: 37.1 (05 Mar 2019 00:00)  T(F): 98.8 (05 Mar 2019 00:00), Max: 98.8 (05 Mar 2019 00:00)  HR: 64 (05 Mar 2019 04:13) (46 - 66)  BP: 111/44 (05 Mar 2019 01:00) (71/35 - 162/111)  BP(mean): 64 (05 Mar 2019 01:00) (48 - 85)  ABP: --  ABP(mean): --  RR: 23 (05 Mar 2019 01:00) (15 - 27)  SpO2: 100% (05 Mar 2019 04:13) (86% - 100%)    Mode: AC/ CMV (Assist Control/ Continuous Mandatory Ventilation), RR (machine): 16, TV (machine): 300, FiO2: 40, PEEP: 5, ITime: 1, MAP: 12, PIP: 30    03-04 @ 07:01  -  03-05 @ 05:20  --------------------------------------------------------  IN: 25.4 mL / OUT: 110 mL / NET: -84.6 mL      CAPILLARY BLOOD GLUCOSE          PHYSICAL EXAM:        Neuro:  sleeping arousable to verbal and tactile stimuli, focuses upon examiner, pt with spontaneous purposeful movement of all 4 extremities UE 4/5, LE 3-4/5. Pt speaks is chinese speaking. Pupils 3 mm reactive equal. OD eye lid with edema. Eye without drainage, redness    Pulm:  Orally intubated / trached to mechancial vent RR 12   , Vt 300   , FIO2 40   , Peep 5   , Pip 35   , Ppl 25   .  breath sounds bilat, diminished in lower lung fields bases to 1/4 up Rt, to 1/3 up Lt. few bibasilar crackles, able to take deep breaths spontaneously and upon command. SPO2 98%. POCUS anterior A lines with focal B lines in bases, dynamic bronchograms in RLL, moderate Left pleural effusion with Lt atelectasis    CV:  cardiac monitor afib with ventricular response of 50's to 60's, s1/s2 I/VI sys murmur appreciated , peripheral pulses palpable with radial 2+ bilat, dp/pt 1+/1+ bilat, digits of hands warm to touch with good cap refill < 3 secs , digits of feet slight cool with fair cap refill >3<6 secs . POCUS with good LVFX RV=LV, with effacement appreciated, VTI 18, IVC 1.45      GI/:  abd  soft  non distended non tender , + hypoactive bowel sounds. vieyra patent to bsd bladder non distended non palpable    Skin:   warm dry intact. without palpable nodes. without JVD appreciated        HOSPITAL MEDICATIONS:  MEDICATIONS  (STANDING):  ALBUTerol/ipratropium for Nebulization 3 milliLiter(s) Nebulizer every 6 hours  aspirin  chewable 81 milliGRAM(s) Oral daily  azithromycin  IVPB 500 milliGRAM(s) IV Intermittent every 24 hours  dextrose 5%. 1000 milliLiter(s) (50 mL/Hr) IV Continuous <Continuous>  dextrose 50% Injectable 12.5 Gram(s) IV Push once  dextrose 50% Injectable 25 Gram(s) IV Push once  dextrose 50% Injectable 25 Gram(s) IV Push once  heparin  Injectable 5000 Unit(s) SubCutaneous every 12 hours  insulin lispro (HumaLOG) corrective regimen sliding scale   SubCutaneous every 6 hours  norepinephrine Infusion 0.05 MICROgram(s)/kG/Min (3.403 mL/Hr) IV Continuous <Continuous>  piperacillin/tazobactam IVPB. 3.375 Gram(s) IV Intermittent every 12 hours  vancomycin  IVPB 750 milliGRAM(s) IV Intermittent every 24 hours    MEDICATIONS  (PRN):  ALBUTerol    90 MICROgram(s) HFA Inhaler 2 Puff(s) Inhalation every 12 hours PRN asthma  dextrose 40% Gel 15 Gram(s) Oral once PRN Blood Glucose LESS THAN 70 milliGRAM(s)/deciliter  fentaNYL    Injectable 12.5 MICROGram(s) IV Push every 4 hours PRN agitation/pain  glucagon  Injectable 1 milliGRAM(s) IntraMuscular once PRN Glucose LESS THAN 70 milligrams/deciliter      LABS:                        10.1   5.5   )-----------( 178      ( 04 Mar 2019 17:35 )             30.7     Hgb Trend: 10.1<--, 11.9<--  03-05    142  |  99  |  50<H>  ----------------------------<  124<H>  4.4   |  29  |  1.45<H>    Ca    9.2      05 Mar 2019 02:54  Phos  3.1     03-05  Mg     2.1     03-05    TPro  5.8<L>  /  Alb  2.6<L>  /  TBili  0.5  /  DBili  x   /  AST  29  /  ALT  14  /  AlkPhos  83  03-05    LIVER FUNCTIONS - ( 05 Mar 2019 02:54 )  Alb: 2.6 g/dL / Pro: 5.8 g/dL / ALK PHOS: 83 U/L / ALT: 14 U/L / AST: 29 U/L / GGT: x           Creatinine Trend: 1.45<--, 1.52<--, 1.39<--  PT/INR - ( 05 Mar 2019 02:54 )   PT: See Note sec;   INR: See Note ratio         PTT - ( 04 Mar 2019 17:35 )  PTT:29.9 sec  Urinalysis Basic - ( 04 Mar 2019 15:12 )    Color: Yellow / Appearance: Clear / S.021 / pH: x  Gluc: x / Ketone: Negative  / Bili: Negative / Urobili: 2 mg/dL   Blood: x / Protein: 100 / Nitrite: Negative   Leuk Esterase: Negative / RBC: 2 /hpf / WBC 1 /HPF   Sq Epi: x / Non Sq Epi: 1 /hpf / Bacteria: Negative      Arterial Blood Gas:   @ 02:49  7.59/38/139/37/100/13.4  ABG lactate: --  Arterial Blood Gas:   @ 21:27  7.43/58/139/38/99/11.5  ABG lactate: --  Arterial Blood Gas:   @ 17:50  7.40/60/140/37/99/10.4  ABG lactate: --  Arterial Blood Gas:   @ 14:04  7.25/96/84/41/92/10.9  ABG lactate: --    Venous Blood Gas:   @ 11:39  7.21/104/36/40/41  VBG Lactate: 2.7      MICROBIOLOGY:     RADIOLOGY:  [ ] Reviewed and interpreted by me    EKG:      [facility]angel [facility]-BC (ext [id number])

## 2019-03-06 LAB
ALBUMIN FLD-MCNC: 1.5 G/DL — SIGNIFICANT CHANGE UP
ALBUMIN SERPL ELPH-MCNC: 2.3 G/DL — LOW (ref 3.3–5)
ALP SERPL-CCNC: 67 U/L — SIGNIFICANT CHANGE UP (ref 40–120)
ALT FLD-CCNC: 14 U/L — SIGNIFICANT CHANGE UP (ref 10–45)
ANION GAP SERPL CALC-SCNC: 10 MMOL/L — SIGNIFICANT CHANGE UP (ref 5–17)
APTT BLD: 52 SEC — HIGH (ref 27.5–36.3)
AST SERPL-CCNC: 32 U/L — SIGNIFICANT CHANGE UP (ref 10–40)
B PERT IGG+IGM PNL SER: CLEAR — SIGNIFICANT CHANGE UP
BASOPHILS # BLD AUTO: 0 K/UL — SIGNIFICANT CHANGE UP (ref 0–0.2)
BASOPHILS NFR BLD AUTO: 0.2 % — SIGNIFICANT CHANGE UP (ref 0–2)
BILIRUB SERPL-MCNC: 0.5 MG/DL — SIGNIFICANT CHANGE UP (ref 0.2–1.2)
BUN SERPL-MCNC: 47 MG/DL — HIGH (ref 7–23)
CALCIUM SERPL-MCNC: 9 MG/DL — SIGNIFICANT CHANGE UP (ref 8.4–10.5)
CHLORIDE SERPL-SCNC: 99 MMOL/L — SIGNIFICANT CHANGE UP (ref 96–108)
CO2 SERPL-SCNC: 32 MMOL/L — HIGH (ref 22–31)
COLOR FLD: YELLOW — SIGNIFICANT CHANGE UP
CREAT SERPL-MCNC: 1.29 MG/DL — SIGNIFICANT CHANGE UP (ref 0.5–1.3)
CULTURE RESULTS: SIGNIFICANT CHANGE UP
DIGOXIN SERPL-MCNC: 2 NG/ML — SIGNIFICANT CHANGE UP (ref 0.8–2)
EOSINOPHIL # BLD AUTO: 0 K/UL — SIGNIFICANT CHANGE UP (ref 0–0.5)
EOSINOPHIL NFR BLD AUTO: 0.6 % — SIGNIFICANT CHANGE UP (ref 0–6)
FLUID INTAKE SUBSTANCE CLASS: SIGNIFICANT CHANGE UP
FLUID SEGMENTED GRANULOCYTES: 26 % — SIGNIFICANT CHANGE UP
GAS PNL BLDA: SIGNIFICANT CHANGE UP
GLUCOSE FLD-MCNC: 214 MG/DL — SIGNIFICANT CHANGE UP
GLUCOSE SERPL-MCNC: 226 MG/DL — HIGH (ref 70–99)
GRAM STN FLD: SIGNIFICANT CHANGE UP
HCT VFR BLD CALC: 34.1 % — LOW (ref 34.5–45)
HGB BLD-MCNC: 11.6 G/DL — SIGNIFICANT CHANGE UP (ref 11.5–15.5)
INR BLD: 1.09 RATIO — SIGNIFICANT CHANGE UP (ref 0.88–1.16)
LDH SERPL L TO P-CCNC: 101 U/L — SIGNIFICANT CHANGE UP
LYMPHOCYTES # BLD AUTO: 0.4 K/UL — LOW (ref 1–3.3)
LYMPHOCYTES # BLD AUTO: 8.9 % — LOW (ref 13–44)
LYMPHOCYTES # FLD: 43 % — SIGNIFICANT CHANGE UP
MAGNESIUM SERPL-MCNC: 2 MG/DL — SIGNIFICANT CHANGE UP (ref 1.6–2.6)
MCHC RBC-ENTMCNC: 34 GM/DL — SIGNIFICANT CHANGE UP (ref 32–36)
MCHC RBC-ENTMCNC: 34 PG — SIGNIFICANT CHANGE UP (ref 27–34)
MCV RBC AUTO: 100 FL — SIGNIFICANT CHANGE UP (ref 80–100)
MESOTHL CELL # FLD: 5 % — SIGNIFICANT CHANGE UP
MONOCYTES # BLD AUTO: 0.3 K/UL — SIGNIFICANT CHANGE UP (ref 0–0.9)
MONOCYTES NFR BLD AUTO: 5.6 % — SIGNIFICANT CHANGE UP (ref 2–14)
MONOS+MACROS # FLD: 26 % — SIGNIFICANT CHANGE UP
NEUTROPHILS # BLD AUTO: 4.2 K/UL — SIGNIFICANT CHANGE UP (ref 1.8–7.4)
NEUTROPHILS NFR BLD AUTO: 84.7 % — HIGH (ref 43–77)
NIGHT BLUE STAIN TISS: SIGNIFICANT CHANGE UP
NIGHT BLUE STAIN TISS: SIGNIFICANT CHANGE UP
PH FLD: 7.57 — SIGNIFICANT CHANGE UP
PHOSPHATE SERPL-MCNC: 3.6 MG/DL — SIGNIFICANT CHANGE UP (ref 2.5–4.5)
PLATELET # BLD AUTO: 141 K/UL — LOW (ref 150–400)
POTASSIUM SERPL-MCNC: 3.9 MMOL/L — SIGNIFICANT CHANGE UP (ref 3.5–5.3)
POTASSIUM SERPL-SCNC: 3.9 MMOL/L — SIGNIFICANT CHANGE UP (ref 3.5–5.3)
PROT FLD-MCNC: 2.9 G/DL — SIGNIFICANT CHANGE UP
PROT SERPL-MCNC: 5.1 G/DL — LOW (ref 6–8.3)
PROTHROM AB SERPL-ACNC: 12.6 SEC — SIGNIFICANT CHANGE UP (ref 10–12.9)
RBC # BLD: 3.41 M/UL — LOW (ref 3.8–5.2)
RBC # FLD: 15.7 % — HIGH (ref 10.3–14.5)
RCV VOL RI: 750 /UL — HIGH (ref 0–5)
SODIUM SERPL-SCNC: 141 MMOL/L — SIGNIFICANT CHANGE UP (ref 135–145)
SPECIMEN SOURCE: SIGNIFICANT CHANGE UP
TOTAL NUCLEATED CELL COUNT, BODY FLUID: 260 /UL — HIGH (ref 0–5)
TUBE TYPE: SIGNIFICANT CHANGE UP
WBC # BLD: 5 K/UL — SIGNIFICANT CHANGE UP (ref 3.8–10.5)
WBC # FLD AUTO: 5 K/UL — SIGNIFICANT CHANGE UP (ref 3.8–10.5)

## 2019-03-06 PROCEDURE — 88112 CYTOPATH CELL ENHANCE TECH: CPT | Mod: 26

## 2019-03-06 PROCEDURE — 88305 TISSUE EXAM BY PATHOLOGIST: CPT | Mod: 26

## 2019-03-06 PROCEDURE — 32555 ASPIRATE PLEURA W/ IMAGING: CPT

## 2019-03-06 PROCEDURE — 93306 TTE W/DOPPLER COMPLETE: CPT | Mod: 26

## 2019-03-06 PROCEDURE — 99291 CRITICAL CARE FIRST HOUR: CPT

## 2019-03-06 RX ORDER — FENTANYL CITRATE 50 UG/ML
12.5 INJECTION INTRAVENOUS ONCE
Qty: 0 | Refills: 0 | Status: DISCONTINUED | OUTPATIENT
Start: 2019-03-06 | End: 2019-03-06

## 2019-03-06 RX ORDER — FENTANYL CITRATE 50 UG/ML
50 INJECTION INTRAVENOUS ONCE
Qty: 0 | Refills: 0 | Status: DISCONTINUED | OUTPATIENT
Start: 2019-03-06 | End: 2019-03-06

## 2019-03-06 RX ORDER — HALOPERIDOL DECANOATE 100 MG/ML
2.5 INJECTION INTRAMUSCULAR ONCE
Qty: 0 | Refills: 0 | Status: COMPLETED | OUTPATIENT
Start: 2019-03-06 | End: 2019-03-07

## 2019-03-06 RX ORDER — SODIUM CHLORIDE 9 MG/ML
500 INJECTION, SOLUTION INTRAVENOUS ONCE
Qty: 0 | Refills: 0 | Status: COMPLETED | OUTPATIENT
Start: 2019-03-06 | End: 2019-03-06

## 2019-03-06 RX ORDER — NOREPINEPHRINE BITARTRATE/D5W 8 MG/250ML
0.01 PLASTIC BAG, INJECTION (ML) INTRAVENOUS
Qty: 8 | Refills: 0 | Status: DISCONTINUED | OUTPATIENT
Start: 2019-03-06 | End: 2019-03-06

## 2019-03-06 RX ORDER — SODIUM CHLORIDE 9 MG/ML
500 INJECTION INTRAMUSCULAR; INTRAVENOUS; SUBCUTANEOUS ONCE
Qty: 0 | Refills: 0 | Status: COMPLETED | OUTPATIENT
Start: 2019-03-06 | End: 2019-03-06

## 2019-03-06 RX ADMIN — Medication 1: at 15:24

## 2019-03-06 RX ADMIN — FENTANYL CITRATE 50 MICROGRAM(S): 50 INJECTION INTRAVENOUS at 02:10

## 2019-03-06 RX ADMIN — HEPARIN SODIUM 5000 UNIT(S): 5000 INJECTION INTRAVENOUS; SUBCUTANEOUS at 17:52

## 2019-03-06 RX ADMIN — Medication 2: at 11:05

## 2019-03-06 RX ADMIN — Medication 3 MILLILITER(S): at 01:04

## 2019-03-06 RX ADMIN — HEPARIN SODIUM 5000 UNIT(S): 5000 INJECTION INTRAVENOUS; SUBCUTANEOUS at 06:23

## 2019-03-06 RX ADMIN — DEXMEDETOMIDINE HYDROCHLORIDE IN 0.9% SODIUM CHLORIDE 2.38 MICROGRAM(S)/KG/HR: 4 INJECTION INTRAVENOUS at 17:54

## 2019-03-06 RX ADMIN — PIPERACILLIN AND TAZOBACTAM 25 GRAM(S): 4; .5 INJECTION, POWDER, LYOPHILIZED, FOR SOLUTION INTRAVENOUS at 17:54

## 2019-03-06 RX ADMIN — DEXMEDETOMIDINE HYDROCHLORIDE IN 0.9% SODIUM CHLORIDE 2.38 MICROGRAM(S)/KG/HR: 4 INJECTION INTRAVENOUS at 09:15

## 2019-03-06 RX ADMIN — FENTANYL CITRATE 12.5 MICROGRAM(S): 50 INJECTION INTRAVENOUS at 09:10

## 2019-03-06 RX ADMIN — Medication 81 MILLIGRAM(S): at 13:03

## 2019-03-06 RX ADMIN — Medication 250 MILLIGRAM(S): at 00:09

## 2019-03-06 RX ADMIN — SODIUM CHLORIDE 1000 MILLILITER(S): 9 INJECTION, SOLUTION INTRAVENOUS at 04:24

## 2019-03-06 RX ADMIN — FENTANYL CITRATE 12.5 MICROGRAM(S): 50 INJECTION INTRAVENOUS at 23:15

## 2019-03-06 RX ADMIN — POLYETHYLENE GLYCOL 3350 17 GRAM(S): 17 POWDER, FOR SOLUTION ORAL at 14:40

## 2019-03-06 RX ADMIN — Medication 3 MILLILITER(S): at 13:00

## 2019-03-06 RX ADMIN — AZITHROMYCIN 250 MILLIGRAM(S): 500 TABLET, FILM COATED ORAL at 14:41

## 2019-03-06 RX ADMIN — FENTANYL CITRATE 12.5 MICROGRAM(S): 50 INJECTION INTRAVENOUS at 09:25

## 2019-03-06 RX ADMIN — Medication 3 MILLILITER(S): at 05:28

## 2019-03-06 RX ADMIN — Medication 3 MILLILITER(S): at 17:23

## 2019-03-06 RX ADMIN — PIPERACILLIN AND TAZOBACTAM 25 GRAM(S): 4; .5 INJECTION, POWDER, LYOPHILIZED, FOR SOLUTION INTRAVENOUS at 06:23

## 2019-03-06 RX ADMIN — SODIUM CHLORIDE 1000 MILLILITER(S): 9 INJECTION INTRAMUSCULAR; INTRAVENOUS; SUBCUTANEOUS at 02:02

## 2019-03-06 RX ADMIN — FENTANYL CITRATE 50 MICROGRAM(S): 50 INJECTION INTRAVENOUS at 01:40

## 2019-03-06 NOTE — DIETITIAN INITIAL EVALUATION ADULT. - PERTINENT LABORATORY DATA
03-06 CO2 32 mmol/L<H>  BUN 47 mg/dL<H>  Glu 226 mg/dL<H>  Hemoglobin A1C, Whole Blood: 8.7 % (03-05-19 @ 08:26) 03-06 CO2 32 mmol/L<H>  BUN 47 mg/dL<H>  Glu 226 mg/dL<H>  Hemoglobin A1C, Whole Blood: 8.7 % (03-05-19 @ 08:26)  24Hr FS; 03-06-19 @ 04:11 -- 144<H>   03-05-19 @ 22:58 -- 208<H>   03-05-19 @ 15:34 -- 129<H>

## 2019-03-06 NOTE — DIETITIAN INITIAL EVALUATION ADULT. - SOURCE
other (specify)/Chart review, RN other (specify)/Chart review, RN, daughter at bedside other (specify)/Chart review, RN, daughter at bedside able to provide limited information (not primary caregiver)

## 2019-03-06 NOTE — DIETITIAN INITIAL EVALUATION ADULT. - NS AS NUTRI INTERV ENTERAL NUTRITION
Rate/Volume/Increase glucerna 1.2 to 45mL/hr x 18 hrs to provide 720 mL fluid, 972 kcal, 48.6 gm protein (per kg 47.7 dosing weight, 20.4 kcal/kg, 1.02 gm protein/kg) Rate/Increase Glucerna 1.2 to 50mL/hr x 18 hrs to provide 900 mL fluid, 1080 kcal, 67.5 gm protein (per kg 47.7 dosing weight, 21.3 kcal/kg, 1.33 gm protein/kg)/Volume

## 2019-03-06 NOTE — PROGRESS NOTE ADULT - ASSESSMENT
89 yr old female with PMHx of Alzheimer's HTN, Afib, HTN, CHF ( EF from 2015 of 62%), Asthma, a/w hypercapnic respiratory failure s/p inubation 2/2 CHF exacerbation vs /and / or PNA w a noted significant L sided pleural effusion.    Plan:    #Neuro:  -neuro checks as per protocol      -pain/sedation with fentanyl 12.5 mcg IV q 4 hrs prn  -precedex gtt titrate to RASS 0 to -2    #Pulm:  -Hypercapnic resp failure  -mechanical vent therapy - titrate to ph 7.35-7.45; PCO2 35-45; SPO2 > 92%  -duoneb therapy q 6 hrs  -pt on home albuterol neb therapy q 12 hrs  -continue albuterol neb therapy q 12 hrs and prn sob/wheezes  -HOB >/= 30 degree angle  -chest PT q 2 hrs  -consider thoracentesis of Lt lung    #CV:  -pt with Hx of CHF (EF from 2015 62%, increased LE edema Lt>Rt, treated initially with lasix, changed to spironolactone last week  -obtain ECG  q day x 3 days  -obtain cardiac enzymes  q 8 hrs x3  -obtain duplex of lower extremities to r/o DVT (LE edema Lt>Rt)  -pt with hx of afib on dig  -obtain dig level  -consider heparin gtt  -obtain TTE to eval progression of heart disease  -given elevated BNP - continue lasix 40 mg IV q 12 hrs - keep output 500 cc/hr      #GI/:  -NPO at present while on bipap therapy  -dietician consult  -while NPO - protonix 40 mg IV qd  -Pt with Hx of VANIA/CKD - currently with sCr 1.45  -strict I & O's keep even       #ID:  -Combi cath bronch cultures 3/4 coag neg staph/ enterococcus species/strep non Group A,B or Strep pneumoniae  -F/U urine legionella  -zosyn 3.375 gm q 8 hrs   -vanco 1 gm IV qd  -zithromax 500 mg IV qday    #FEN/ENDO/HEME:  -cmp/mg++/po--4/cbc with diff/coags now and q day  -abg prn  -Pt with hx of DM2 on oral hypoglycemics  -POC glucose q 4 hrs with insulin sliding scale - maintain glucose 140 to 160  -as effacement noted, and sCr elevated -would give LR at 75 cc/hr x 10 hrs              Attending Attestation:   1. resp failure likely sec to large left effusion (low lung volumes resulting on SBT) - need thoracentesis but family unsure re consent.   2. ? PNA less likely but given 'rare gpc in pairs' from ETT and 1/4 stap epi on cx (likely contaminant) will treat with 5 ds  3. CHF - no evidence of pulm edema and no JVD + sclerotic and immobile appearing mitral leaflet -> formal echo to evaluate  4. hypotension this am with likely sedation and hypovolemia after diuresis -  >1 L LR 89 yr old female with PMHx of Alzheimer's HTN, Afib, HTN, CHF ( EF from 2015 of 62%), Asthma, a/w hypercapnic respiratory failure s/p inubation 2/2 CHF exacerbation vs /and / or PNA w a noted significant L sided pleural effusion.    Plan:    #Neuro:  -neuro checks as per protocol  -c/w Precedex- low dose - titrate to RASS 0 - -2  -pain/sedation with fentanyl 12.5 mcg IV q 4 hrs prn      #Pulm:  Hypercapnic resp failure s/p intubation  -c/w   -duoneb therapy q 6 hrs  -pt on home albuterol neb therapy q 12 hrs  -continue albuterol neb therapy q 12 hrs and prn sob/wheezes  -HOB >/= 30 degree angle  -chest PT q 2 hrs  -consider thoracentesis of Lt lung    #CV:  -pt with Hx of CHF (EF from 2015 62%, increased LE edema Lt>Rt, treated initially with lasix, changed to spironolactone last week  -obtain ECG  q day x 3 days  -obtain cardiac enzymes  q 8 hrs x3  -obtain duplex of lower extremities to r/o DVT (LE edema Lt>Rt)  -pt with hx of afib on dig  -obtain dig level  -consider heparin gtt  -obtain TTE to eval progression of heart disease  -given elevated BNP - continue lasix 40 mg IV q 12 hrs - keep output 500 cc/hr      #GI/:  -NPO at present while on bipap therapy  -dietician consult  -while NPO - protonix 40 mg IV qd  -Pt with Hx of VANIA/CKD - currently with sCr 1.45  -strict I & O's keep even       #ID:  -Combi cath bronch cultures 3/4 coag neg staph/ enterococcus species/strep non Group A,B or Strep pneumoniae  -F/U urine legionella  -zosyn 3.375 gm q 8 hrs   -vanco 1 gm IV qd  -zithromax 500 mg IV qday    #FEN/ENDO/HEME:  -cmp/mg++/po--4/cbc with diff/coags now and q day  -abg prn  -Pt with hx of DM2 on oral hypoglycemics  -POC glucose q 4 hrs with insulin sliding scale - maintain glucose 140 to 160  -as effacement noted, and sCr elevated -would give LR at 75 cc/hr x 10 hrs              Attending Attestation:   1. resp failure likely sec to large left effusion (low lung volumes resulting on SBT) - need thoracentesis but family unsure re consent.   2. ? PNA less likely but given 'rare gpc in pairs' from ETT and 1/4 stap epi on cx (likely contaminant) will treat with 5 ds  3. CHF - no evidence of pulm edema and no JVD + sclerotic and immobile appearing mitral leaflet -> formal echo to evaluate  4. hypotension this am with likely sedation and hypovolemia after diuresis -  >1 L LR 89 yr old female with PMHx of Alzheimer's HTN, Afib, HTN, CHF ( EF from 2015 of 62%), Asthma, a/w hypercapnic respiratory failure s/p intubation 2/2 CHF exacerbation vs /and / or PNA w a noted significant L sided pleural effusion.    Plan:    #Neuro:  -neuro checks as per protocol  -c/w Precedex- low dose - titrate to RASS 0 - -2  -pain/sedation with fentanyl 12.5 mcg IV q 4 hrs prn      #Pulm:  Hypercapnic resp failure s/p intubation  -duoneb therapy q 6 hrs  -pt on home albuterol neb therapy q 12 hrs  -continue albuterol neb therapy q 12 hrs and prn sob/wheezes  -HOB >/= 30 degree angle  -chest PT q 2 hrs  -PS trails - with goal of extubation    #CV:  Hx of CHF (EF from 2015 62%)  -obtain duplex of lower extremities to r/o DVT (LE edema Lt>Rt)  -pt with hx of afib on digoxin- level 2- c/w holding   -not on anticoagulation at home in the setting of advanced age / Alzheimer's and high risk of fallst  -obtain TTE to eval progression of heart disease  -given elevated BNP , hx CHF on chronic Lasix - continue lasix 40 mg IV q 12 hrs - keep output 500 cc/hr      #GI/:  -dietician consult  -while NPO - protonix 40 mg IV qd  -Pt with Hx of VANIA/CKD - currently with sCr 1.29- improving slowly  -strict I & O's keep even       #ID: S/P L thoracentesis- borderline exudative  -Combi cath bronch cultures 3/4 coag neg staph/ enterococcus species/strep non Group A,B or Strep pneumoniae  -F/U urine legionella- negative  -zosyn 3.375 gm q 8 hrs   -zithromax 500 mg IV qday  -c/w vanco

## 2019-03-06 NOTE — DIETITIAN INITIAL EVALUATION ADULT. - OTHER INFO
Pt seen for length of stay in MICU. Pt with PMHx Alzheimer's, HTN, afib, THN, CHF and T2DM. Pt admitted for hypercapnic respiratory failure, on mechanical vent. Unable to interview patient at present due to current sedation and intubation. RN denied any instances of nausea/vomiting/constipation/diarrhea. Chewing/swallowing and feeding difficulty addressed with EN. Pt reached goal last night @40mL/hr x 18 hrs. Pt seen for length of stay in MICU. Pt with PMHx Alzheimer's, HTN, afib, THN, CHF and T2DM. Pt admitted for hypercapnic respiratory failure, on mechanical vent. Unable to interview patient at present due to current sedation and intubation. RN denied any instances of nausea/vomiting/constipation/diarrhea. No BMs recorded on chart, none today noted by RN. Chewing/swallowing and feeding difficulty addressed with EN. Pt reached goal last night @40mL/hr x 18 hrs. Pt seen for length of stay in MICU. Pt with PMHx Alzheimer's, HTN, afib, THN, CHF and T2DM. Pt admitted for hypercapnic respiratory failure, on mechanical vent. Unable to interview patient at present due to current sedation and intubation. RN denied any instances of nausea/vomiting/constipation/diarrhea. Last BM recorded (3-03), none today noted by RN. Chewing/swallowing and feeding difficulty addressed with EN. Pt reached goal last night @40mL/hr x 18 hrs. Pt seen for length of stay in MICU. Pt with PMHx Alzheimer's, HTN, afib, CHF and T2DM. Pt admitted for hypercapnic respiratory failure, on mechanical vent. RN denied any instances of nausea/vomiting/constipation/diarrhea. Last BM recorded (3-03), none today noted by RN.

## 2019-03-06 NOTE — AIRWAY REMOVAL NOTE  ADULT & PEDS - ARTIFICAL AIRWAY REMOVAL COMMENTS
Written order for extubation verified.  Patient identified by full name and birth date as per identification band.  Present at the procedure was Cate Obrien RN

## 2019-03-06 NOTE — DIETITIAN INITIAL EVALUATION ADULT. - ORAL INTAKE PTA
n/a/Unable to assess at present n/a/Daughter reported family cut food into small pieces for Pt; NKFA n/a/Daughter reported family cut food into small pieces for Pt; NKFA; does not eat beef

## 2019-03-06 NOTE — DIETITIAN INITIAL EVALUATION ADULT. - PERTINENT MEDS FT
ALBUTerol    90 MICROgram(s) HFA Inhaler PRN  ALBUTerol/ipratropium for Nebulization  insulin lispro (HumaLOG) corrective regimen sliding scale  polyethylene glycol 3350  senna Syrup

## 2019-03-06 NOTE — DIETITIAN INITIAL EVALUATION ADULT. - ENERGY NEEDS
Height (cm): 154.9 (03-04)     Weight (kg): 47.7 (03-04)  BMI (kg/m2): 19.9 (03-04)     IBW (kg): 50 +/-10%   % IBW: 95.4  No edema or pressure injuries noted per nursing flow sheet  The [place] State Equation (PSU) 2003b was used to calculate resting energy expenditure, recommended 957 kcal/day Height (cm): 154.9 (03-04)     Weight (kg): 47.7 (03-04)  BMI (kg/m2): 19.9 (03-04)     IBW (kg): 50 +/-10%   % IBW: 95.4  No edema or pressure injuries noted per nursing flow sheet  The [place] State Equation (PSU) 2003b was used to calculate resting energy expenditure, recommended 1022 kcal/day

## 2019-03-06 NOTE — PROGRESS NOTE ADULT - SUBJECTIVE AND OBJECTIVE BOX
CHIEF COMPLAINT:    Interval Events:    REVIEW OF SYSTEMS:  Constitutional: [ ] fevers [ ] chills [ ] weight loss [ ] weight gain  HEENT: [ ] dry eyes [ ] eye irritation [ ] postnasal drip [ ] nasal congestion  CV: [ ] chest pain [ ] orthopnea [ ] palpitations [ ] murmur  Resp: [ ] cough [ ] shortness of breath [ ] dyspnea [ ] wheezing [ ] sputum [ ] hemoptysis  GI: [ ] nausea [ ] vomiting [ ] diarrhea [ ] constipation [ ] abd pain [ ] dysphagia   : [ ] dysuria [ ] nocturia [ ] hematuria [ ] increased urinary frequency  Musculoskeletal: [ ] back pain [ ] myalgias [ ] arthralgias [ ] fracture  Skin: [ ] rash [ ] itch  Neurological: [ ] headache [ ] dizziness [ ] syncope [ ] weakness [ ] numbness  Psychiatric: [ ] anxiety [ ] depression  Endocrine: [ ] diabetes [ ] thyroid problem  Hematologic/Lymphatic: [ ] anemia [ ] bleeding problem  Allergic/Immunologic: [ ] itchy eyes [ ] nasal discharge [ ] hives [ ] angioedema  [ ] All other systems negative  [ ] Unable to assess ROS because ________    OBJECTIVE:  ICU Vital Signs Last 24 Hrs  T(C): 37.2 (06 Mar 2019 09:00), Max: 37.2 (06 Mar 2019 04:00)  T(F): 99 (06 Mar 2019 09:00), Max: 99 (06 Mar 2019 09:00)  HR: 87 (06 Mar 2019 09:25) (54 - 97)  BP: 113/54 (06 Mar 2019 09:00) (70/32 - 203/84)  BP(mean): 78 (06 Mar 2019 09:00) (46 - 120)  ABP: --  ABP(mean): --  RR: 18 (06 Mar 2019 09:25) (12 - 33)  SpO2: 100% (06 Mar 2019 09:25) (100% - 100%)    Mode: AC/ CMV (Assist Control/ Continuous Mandatory Ventilation), RR (machine): 18, TV (machine): 300, FiO2: 40, PEEP: 5, ITime: 0.9, MAP: 6, PIP: 15  I & O's    -05 @ 07:01  -  03-06 @ 07:00  --------------------------------------------------------  IN: 3029 mL / OUT: 1155 mL / NET: 1874 mL     @ 07:01  -   @ 09:32  --------------------------------------------------------  IN: 23.3 mL / OUT: 70 mL / NET: -46.7 mL      CAPILLARY BLOOD GLUCOSE      POCT Blood Glucose.: 144 mg/dL (06 Mar 2019 04:11)      PHYSICAL EXAM:  General:   HEENT:   Lymph Nodes:  Neck:   Respiratory:   Cardiovascular:   Abdomen:   Extremities:   Skin:   Neurological:  Psychiatry:    LINES:    HOSPITAL MEDICATIONS:  MEDICATIONS  (STANDING):  ALBUTerol/ipratropium for Nebulization 3 milliLiter(s) Nebulizer every 6 hours  aspirin  chewable 81 milliGRAM(s) Oral daily  azithromycin  IVPB 500 milliGRAM(s) IV Intermittent every 24 hours  dexmedetomidine Infusion 0.2 MICROgram(s)/kG/Hr (2.385 mL/Hr) IV Continuous <Continuous>  heparin  Injectable 5000 Unit(s) SubCutaneous every 12 hours  insulin lispro (HumaLOG) corrective regimen sliding scale   SubCutaneous every 6 hours  lactated ringers. 1000 milliLiter(s) (100 mL/Hr) IV Continuous <Continuous>  piperacillin/tazobactam IVPB. 3.375 Gram(s) IV Intermittent every 12 hours  polyethylene glycol 3350 17 Gram(s) Oral daily  senna Syrup 10 milliLiter(s) Oral at bedtime  vancomycin  IVPB 750 milliGRAM(s) IV Intermittent every 24 hours    MEDICATIONS  (PRN):  ALBUTerol    90 MICROgram(s) HFA Inhaler 2 Puff(s) Inhalation every 12 hours PRN asthma  fentaNYL    Injectable 12.5 MICROGram(s) IV Push every 4 hours PRN agitation/pain      LABS:                        11.6   5.0   )-----------( 141      ( 06 Mar 2019 00:35 )             34.1     Hgb Trend: 11.6<--, 11.2<--, 10.1<--, 11.9<--  03-06    141  |  99  |  47<H>  ----------------------------<  226<H>  3.9   |  32<H>  |  1.29    Ca    9.0      06 Mar 2019 00:50  Phos  3.6       Mg     2.0         TPro  5.1<L>  /  Alb  2.3<L>  /  TBili  0.5  /  DBili  x   /  AST  32  /  ALT  14  /  AlkPhos  67      Creatinine Trend: 1.29<--, 1.45<--, 1.52<--, 1.39<--  PT/INR - ( 06 Mar 2019 00:35 )   PT: 12.6 sec;   INR: 1.09 ratio         PTT - ( 06 Mar 2019 00:35 )  PTT:52.0 sec  Urinalysis Basic - ( 04 Mar 2019 15:12 )    Color: Yellow / Appearance: Clear / S.021 / pH: x  Gluc: x / Ketone: Negative  / Bili: Negative / Urobili: 2 mg/dL   Blood: x / Protein: 100 / Nitrite: Negative   Leuk Esterase: Negative / RBC: 2 /hpf / WBC 1 /HPF   Sq Epi: x / Non Sq Epi: 1 /hpf / Bacteria: Negative      Arterial Blood Gas:   @ 00:39  7.41/58/114/36/98/10.2  ABG lactate: --  Arterial Blood Gas:   @ 05:37  7.52/46/149/37/99/12.9  ABG lactate: --  Arterial Blood Gas:   @ 02:49  7.59/38/139/37/100/13.4  ABG lactate: --  Arterial Blood Gas:   @ 21:27  7.43/58/139/38/99/11.5  ABG lactate: --  Arterial Blood Gas:   @ 17:50  7.40/60/140/37/99/10.4  ABG lactate: --  Arterial Blood Gas:   @ 14:04  7.25/96/84/41/92/10.9  ABG lactate: --    Venous Blood Gas:   @ 11:39  7.21/104/36/40/41  VBG Lactate: 2.7      MICROBIOLOGY:     RADIOLOGY:  [ ] Reviewed and interpreted by me    EKG: CHIEF COMPLAINT: Hypercapnic Respiratory Failure s/p intubation s/p chf exacerbation vs PNA w noted Left sided pleural effusion    Interval Events: S/P overnight thoracentesis 750cc withdrawn. The pt became hypotensive w MAP 50's - 500cc IVF bolus given, U/o decreased to 10cc/hr- an additional 500cc/ IVF bolus administered w ample results.     REVIEW OF SYSTEMS:  [ x] Unable to assess ROS because _becomes agitated when awake/ not following direction _______    OBJECTIVE:  ICU Vital Signs Last 24 Hrs  T(C): 37.2 (06 Mar 2019 09:00), Max: 37.2 (06 Mar 2019 04:00)  T(F): 99 (06 Mar 2019 09:00), Max: 99 (06 Mar 2019 09:00)  HR: 87 (06 Mar 2019 09:25) (54 - 97)  BP: 113/54 (06 Mar 2019 09:00) (70/32 - 203/84)  BP(mean): 78 (06 Mar 2019 09:00) (46 - 120)  ABP: --  ABP(mean): --  RR: 18 (06 Mar 2019 09:25) (12 - 33)  SpO2: 100% (06 Mar 2019 09:25) (100% - 100%)    Mode: AC/ CMV (Assist Control/ Continuous Mandatory Ventilation), RR (machine): 18, TV (machine): 300, FiO2: 40, PEEP: 5, ITime: 0.9, MAP: 6, PIP: 15  I & O's     @ 07:  -   @ 07:00  --------------------------------------------------------  IN: 3029 mL / OUT: 1155 mL / NET: 1874 mL     @ 07:01  -   @ 09:32  --------------------------------------------------------  IN: 23.3 mL / OUT: 70 mL / NET: -46.7 mL      CAPILLARY BLOOD GLUCOSE  POCT Blood Glucose.: 144 mg/dL (06 Mar 2019 04:11)      PHYSICAL EXAM:  General: appears comfortable on Precedex w intermittent IVP of fentanyl for comfort   HEENT: normocephalic, atraumatic, trachea midline, oral mucosa pink / moist   Lymph Nodes:   Neck:   Respiratory:   Cardiovascular:   Abdomen:   Extremities:   Skin:   Neurological:  Psychiatry:    LINES:    HOSPITAL MEDICATIONS:  MEDICATIONS  (STANDING):  ALBUTerol/ipratropium for Nebulization 3 milliLiter(s) Nebulizer every 6 hours  aspirin  chewable 81 milliGRAM(s) Oral daily  azithromycin  IVPB 500 milliGRAM(s) IV Intermittent every 24 hours  dexmedetomidine Infusion 0.2 MICROgram(s)/kG/Hr (2.385 mL/Hr) IV Continuous <Continuous>  heparin  Injectable 5000 Unit(s) SubCutaneous every 12 hours  insulin lispro (HumaLOG) corrective regimen sliding scale   SubCutaneous every 6 hours  lactated ringers. 1000 milliLiter(s) (100 mL/Hr) IV Continuous <Continuous>  piperacillin/tazobactam IVPB. 3.375 Gram(s) IV Intermittent every 12 hours  polyethylene glycol 3350 17 Gram(s) Oral daily  senna Syrup 10 milliLiter(s) Oral at bedtime  vancomycin  IVPB 750 milliGRAM(s) IV Intermittent every 24 hours    MEDICATIONS  (PRN):  ALBUTerol    90 MICROgram(s) HFA Inhaler 2 Puff(s) Inhalation every 12 hours PRN asthma  fentaNYL    Injectable 12.5 MICROGram(s) IV Push every 4 hours PRN agitation/pain      LABS:                        11.6   5.0   )-----------( 141      ( 06 Mar 2019 00:35 )             34.1     Hgb Trend: 11.6<--, 11.2<--, 10.1<--, 11.9<--  03-06    141  |  99  |  47<H>  ----------------------------<  226<H>  3.9   |  32<H>  |  1.29    Ca    9.0      06 Mar 2019 00:50  Phos  3.6     03-06  Mg     2.0     03-06    TPro  5.1<L>  /  Alb  2.3<L>  /  TBili  0.5  /  DBili  x   /  AST  32  /  ALT  14  /  AlkPhos  67      Creatinine Trend: 1.29<--, 1.45<--, 1.52<--, 1.39<--  PT/INR - ( 06 Mar 2019 00:35 )   PT: 12.6 sec;   INR: 1.09 ratio         PTT - ( 06 Mar 2019 00:35 )  PTT:52.0 sec  Urinalysis Basic - ( 04 Mar 2019 15:12 )    Color: Yellow / Appearance: Clear / S.021 / pH: x  Gluc: x / Ketone: Negative  / Bili: Negative / Urobili: 2 mg/dL   Blood: x / Protein: 100 / Nitrite: Negative   Leuk Esterase: Negative / RBC: 2 /hpf / WBC 1 /HPF   Sq Epi: x / Non Sq Epi: 1 /hpf / Bacteria: Negative      Arterial Blood Gas:   @ 00:39  7.41/58/114/36/98/10.2  ABG lactate: --  Arterial Blood Gas:   @ 05:37  7.52/46/149/37/99/12.9  ABG lactate: --  Arterial Blood Gas:   @ 02:49  7.59/38/139/37/100/13.4  ABG lactate: --  Arterial Blood Gas:   @ 21:27  7.43/58/139/38/99/11.5  ABG lactate: --  Arterial Blood Gas:   @ 17:50  7.40/60/140/37/99/10.4  ABG lactate: --  Arterial Blood Gas:   @ 14:04  7.25/96/84/41/92/10.9  ABG lactate: --    Venous Blood Gas:   @ 11:39  7.21/104/36/40/41  VBG Lactate: 2.7      MICROBIOLOGY:     RADIOLOGY:  [ ] Reviewed and interpreted by me    EKG: CHIEF COMPLAINT: Hypercapnic Respiratory Failure s/p intubation s/p chf exacerbation vs PNA w noted Left sided pleural effusion    Interval Events: S/P overnight thoracentesis 750cc withdrawn. The pt became hypotensive w MAP 50's - 500cc IVF bolus given, U/o decreased to 10cc/hr- an additional 500cc/ IVF bolus administered w ample results.     REVIEW OF SYSTEMS:  [ x] Unable to assess ROS because _becomes agitated when awake/ not following direction _______    OBJECTIVE:  ICU Vital Signs Last 24 Hrs  T(C): 37.2 (06 Mar 2019 09:00), Max: 37.2 (06 Mar 2019 04:00)  T(F): 99 (06 Mar 2019 09:00), Max: 99 (06 Mar 2019 09:00)  HR: 87 (06 Mar 2019 09:25) (54 - 97)  BP: 113/54 (06 Mar 2019 09:00) (70/32 - 203/84)  BP(mean): 78 (06 Mar 2019 09:00) (46 - 120)  ABP: --  ABP(mean): --  RR: 18 (06 Mar 2019 09:25) (12 - 33)  SpO2: 100% (06 Mar 2019 09:25) (100% - 100%)    Mode: AC/ CMV 12/300/40%/+5  I & O's     @ 07:  -   @ 07:00  --------------------------------------------------------  IN: 3029 mL / OUT: 1155 mL / NET: 1874 mL     @ 07:01  -  0306 @ 09:32  --------------------------------------------------------  IN: 23.3 mL / OUT: 70 mL / NET: -46.7 mL      CAPILLARY BLOOD GLUCOSE  POCT Blood Glucose.: 144 mg/dL (06 Mar 2019 04:11)      PHYSICAL EXAM:  General: appears comfortable on Precedex w intermittent IVP of fentanyl for comfort   HEENT: normocephalic, atraumatic, trachea midline, oral mucosa pink / moist   Lymph Nodes: bib palp,  Neck:   Respiratory:   Cardiovascular:   Abdomen:   Extremities:   Skin:   Neurological:  Psychiatry:    LINES:    HOSPITAL MEDICATIONS:  MEDICATIONS  (STANDING):  ALBUTerol/ipratropium for Nebulization 3 milliLiter(s) Nebulizer every 6 hours  aspirin  chewable 81 milliGRAM(s) Oral daily  azithromycin  IVPB 500 milliGRAM(s) IV Intermittent every 24 hours  dexmedetomidine Infusion 0.2 MICROgram(s)/kG/Hr (2.385 mL/Hr) IV Continuous <Continuous>  heparin  Injectable 5000 Unit(s) SubCutaneous every 12 hours  insulin lispro (HumaLOG) corrective regimen sliding scale   SubCutaneous every 6 hours  lactated ringers. 1000 milliLiter(s) (100 mL/Hr) IV Continuous <Continuous>  piperacillin/tazobactam IVPB. 3.375 Gram(s) IV Intermittent every 12 hours  polyethylene glycol 3350 17 Gram(s) Oral daily  senna Syrup 10 milliLiter(s) Oral at bedtime  vancomycin  IVPB 750 milliGRAM(s) IV Intermittent every 24 hours    MEDICATIONS  (PRN):  ALBUTerol    90 MICROgram(s) HFA Inhaler 2 Puff(s) Inhalation every 12 hours PRN asthma  fentaNYL    Injectable 12.5 MICROGram(s) IV Push every 4 hours PRN agitation/pain      LABS:                        11.6   5.0   )-----------( 141      ( 06 Mar 2019 00:35 )             34.1     Hgb Trend: 11.6<--, 11.2<--, 10.1<--, 11.9<--  03-    141  |  99  |  47<H>  ----------------------------<  226<H>  3.9   |  32<H>  |  1.29    Ca    9.0      06 Mar 2019 00:50  Phos  3.6     03-06  Mg     2.0     03-06    TPro  5.1<L>  /  Alb  2.3<L>  /  TBili  0.5  /  DBili  x   /  AST  32  /  ALT  14  /  AlkPhos  67  -    Creatinine Trend: 1.29<--, 1.45<--, 1.52<--, 1.39<--  PT/INR - ( 06 Mar 2019 00:35 )   PT: 12.6 sec;   INR: 1.09 ratio         PTT - ( 06 Mar 2019 00:35 )  PTT:52.0 sec  Urinalysis Basic - ( 04 Mar 2019 15:12 )    Color: Yellow / Appearance: Clear / S.021 / pH: x  Gluc: x / Ketone: Negative  / Bili: Negative / Urobili: 2 mg/dL   Blood: x / Protein: 100 / Nitrite: Negative   Leuk Esterase: Negative / RBC: 2 /hpf / WBC 1 /HPF   Sq Epi: x / Non Sq Epi: 1 /hpf / Bacteria: Negative      Arterial Blood Gas:   @ 00:39  7.41/58/114/36/98/10.2  ABG lactate: --  Arterial Blood Gas:   @ 05:37  7.52/46/149/37/99/12.9  ABG lactate: --  Arterial Blood Gas:   @ 02:49  7.59/38/139/37/100/13.4  ABG lactate: --  Arterial Blood Gas:   @ 21:27  7.43/58/139/38/99/11.5  ABG lactate: --  Arterial Blood Gas:   @ 17:50  7.40/60/140/37/99/10.4  ABG lactate: --  Arterial Blood Gas:   @ 14:04  7.25/96/84/41/92/10.9  ABG lactate: --    Venous Blood Gas:   @ 11:39  7.21/104/36/40/41  VBG Lactate: 2.7      MICROBIOLOGY:     RADIOLOGY:  [ ] Reviewed and interpreted by me    EKG: CHIEF COMPLAINT: Hypercapnic Respiratory Failure s/p intubation s/p chf exacerbation vs PNA w noted Left sided pleural effusion    Interval Events: S/P overnight thoracentesis 750cc withdrawn. The pt became hypotensive w MAP 50's - 500cc IVF bolus given, U/o decreased to 10cc/hr- an additional 500cc/ IVF bolus administered w ample results.     REVIEW OF SYSTEMS:  [ x] Unable to assess ROS because _becomes agitated when awake/ not following direction _______    OBJECTIVE:  ICU Vital Signs Last 24 Hrs  T(C): 37.2 (06 Mar 2019 09:00), Max: 37.2 (06 Mar 2019 04:00)  T(F): 99 (06 Mar 2019 09:00), Max: 99 (06 Mar 2019 09:00)  HR: 87 (06 Mar 2019 09:25) (54 - 97)  BP: 113/54 (06 Mar 2019 09:00) (70/32 - 203/84)  BP(mean): 78 (06 Mar 2019 09:00) (46 - 120)  ABP: --  ABP(mean): --  RR: 18 (06 Mar 2019 09:25) (12 - 33)  SpO2: 100% (06 Mar 2019 09:25) (100% - 100%)    Mode: AC/ CMV 12/300/40%/+5  I & O's     @ 07:  -   @ 07:00  --------------------------------------------------------  IN: 3029 mL / OUT: 1155 mL / NET: 1874 mL     @ 07:01  -  0306 @ 09:32  --------------------------------------------------------  IN: 23.3 mL / OUT: 70 mL / NET: -46.7 mL      CAPILLARY BLOOD GLUCOSE  POCT Blood Glucose.: 144 mg/dL (06 Mar 2019 04:11)      PHYSICAL EXAM:  General: appears comfortable on Precedex w intermittent IVP of fentanyl for comfort   HEENT: normocephalic, atraumatic, trachea midline, oral mucosa pink / moist   Lymph Nodes: non palp  Neck: supple, neg JVD  Respiratory: Bilaterally equal BS & chest excursion, orally intubated, bilaterally clear BS , L pleural effusion on FOCUS  Cardiovascular:   Abdomen:   Extremities:   Skin:   Neurological:  Psychiatry:    LINES:    HOSPITAL MEDICATIONS:  MEDICATIONS  (STANDING):  ALBUTerol/ipratropium for Nebulization 3 milliLiter(s) Nebulizer every 6 hours  aspirin  chewable 81 milliGRAM(s) Oral daily  azithromycin  IVPB 500 milliGRAM(s) IV Intermittent every 24 hours  dexmedetomidine Infusion 0.2 MICROgram(s)/kG/Hr (2.385 mL/Hr) IV Continuous <Continuous>  heparin  Injectable 5000 Unit(s) SubCutaneous every 12 hours  insulin lispro (HumaLOG) corrective regimen sliding scale   SubCutaneous every 6 hours  lactated ringers. 1000 milliLiter(s) (100 mL/Hr) IV Continuous <Continuous>  piperacillin/tazobactam IVPB. 3.375 Gram(s) IV Intermittent every 12 hours  polyethylene glycol 3350 17 Gram(s) Oral daily  senna Syrup 10 milliLiter(s) Oral at bedtime  vancomycin  IVPB 750 milliGRAM(s) IV Intermittent every 24 hours    MEDICATIONS  (PRN):  ALBUTerol    90 MICROgram(s) HFA Inhaler 2 Puff(s) Inhalation every 12 hours PRN asthma  fentaNYL    Injectable 12.5 MICROGram(s) IV Push every 4 hours PRN agitation/pain      LABS:                        11.6   5.0   )-----------( 141      ( 06 Mar 2019 00:35 )             34.1     Hgb Trend: 11.6<--, 11.2<--, 10.1<--, 11.9<--  03-06    141  |  99  |  47<H>  ----------------------------<  226<H>  3.9   |  32<H>  |  1.29    Ca    9.0      06 Mar 2019 00:50  Phos  3.6     03-06  Mg     2.0     03-06    TPro  5.1<L>  /  Alb  2.3<L>  /  TBili  0.5  /  DBili  x   /  AST  32  /  ALT  14  /  AlkPhos  67  03-06    Creatinine Trend: 1.29<--, 1.45<--, 1.52<--, 1.39<--  PT/INR - ( 06 Mar 2019 00:35 )   PT: 12.6 sec;   INR: 1.09 ratio         PTT - ( 06 Mar 2019 00:35 )  PTT:52.0 sec  Urinalysis Basic - ( 04 Mar 2019 15:12 )    Color: Yellow / Appearance: Clear / S.021 / pH: x  Gluc: x / Ketone: Negative  / Bili: Negative / Urobili: 2 mg/dL   Blood: x / Protein: 100 / Nitrite: Negative   Leuk Esterase: Negative / RBC: 2 /hpf / WBC 1 /HPF   Sq Epi: x / Non Sq Epi: 1 /hpf / Bacteria: Negative      Arterial Blood Gas:   @ 00:39  7.41/58/114/36/98/10.2  ABG lactate: --  Arterial Blood Gas:   @ 05:37  7.52/46/149/37/99/12.9  ABG lactate: --  Arterial Blood Gas:   @ 02:49  7.59/38/139/37/100/13.4  ABG lactate: --  Arterial Blood Gas:   @ 21:27  7.43/58/139/38/99/11.5  ABG lactate: --  Arterial Blood Gas:   @ 17:50  7.40/60/140/37/99/10.4  ABG lactate: --  Arterial Blood Gas:   @ 14:04  7.25/96/84/41/92/10.9  ABG lactate: --    Venous Blood Gas:   @ 11:39  7.21/104/36/40/41  VBG Lactate: 2.7      MICROBIOLOGY:     RADIOLOGY:  [ ] Reviewed and interpreted by me    EKG: CHIEF COMPLAINT: Hypercapnic Respiratory Failure s/p intubation s/p chf exacerbation vs PNA w noted Left sided pleural effusion    Interval Events: S/P overnight thoracentesis 750cc withdrawn. The pt became hypotensive w MAP 50's - 500cc IVF bolus given, U/o decreased to 10cc/hr- an additional 500cc/ IVF bolus administered w ample results.     REVIEW OF SYSTEMS:  [ x] Unable to assess ROS because _becomes agitated when awake/ not following direction _______    OBJECTIVE:  ICU Vital Signs Last 24 Hrs  T(C): 37.2 (06 Mar 2019 09:00), Max: 37.2 (06 Mar 2019 04:00)  T(F): 99 (06 Mar 2019 09:00), Max: 99 (06 Mar 2019 09:00)  HR: 87 (06 Mar 2019 09:25) (54 - 97)  BP: 113/54 (06 Mar 2019 09:00) (70/32 - 203/84)  BP(mean): 78 (06 Mar 2019 09:00) (46 - 120)  ABP: --  ABP(mean): --  RR: 18 (06 Mar 2019 09:25) (12 - 33)  SpO2: 100% (06 Mar 2019 09:25) (100% - 100%)    Mode: AC/ CMV 12/300/40%/+5  I & O's     @ 07:  -   @ 07:00  --------------------------------------------------------  IN: 3029 mL / OUT: 1155 mL / NET: 1874 mL     @ 07:01  -  0306 @ 09:32  --------------------------------------------------------  IN: 23.3 mL / OUT: 70 mL / NET: -46.7 mL      CAPILLARY BLOOD GLUCOSE  POCT Blood Glucose.: 144 mg/dL (06 Mar 2019 04:11)      PHYSICAL EXAM:  General: appears comfortable on Precedex w intermittent IVP of fentanyl for comfort   HEENT: normocephalic, atraumatic, trachea midline, oral mucosa pink / moist   Lymph Nodes: non palp  Neck: supple, neg JVD  Respiratory: Bilaterally equal BS & chest excursion, orally intubated, bilaterally clear BS , L pleural effusion on FOCUS  Cardiovascular: S1S2 Irregular, Afib, small pericardial effusion noted on BS Focus  Abdomen: non distended, +BS x 4  Extremities:   Skin:   Neurological:  Psychiatry:    LINES:    HOSPITAL MEDICATIONS:  MEDICATIONS  (STANDING):  ALBUTerol/ipratropium for Nebulization 3 milliLiter(s) Nebulizer every 6 hours  aspirin  chewable 81 milliGRAM(s) Oral daily  azithromycin  IVPB 500 milliGRAM(s) IV Intermittent every 24 hours  dexmedetomidine Infusion 0.2 MICROgram(s)/kG/Hr (2.385 mL/Hr) IV Continuous <Continuous>  heparin  Injectable 5000 Unit(s) SubCutaneous every 12 hours  insulin lispro (HumaLOG) corrective regimen sliding scale   SubCutaneous every 6 hours  lactated ringers. 1000 milliLiter(s) (100 mL/Hr) IV Continuous <Continuous>  piperacillin/tazobactam IVPB. 3.375 Gram(s) IV Intermittent every 12 hours  polyethylene glycol 3350 17 Gram(s) Oral daily  senna Syrup 10 milliLiter(s) Oral at bedtime  vancomycin  IVPB 750 milliGRAM(s) IV Intermittent every 24 hours    MEDICATIONS  (PRN):  ALBUTerol    90 MICROgram(s) HFA Inhaler 2 Puff(s) Inhalation every 12 hours PRN asthma  fentaNYL    Injectable 12.5 MICROGram(s) IV Push every 4 hours PRN agitation/pain      LABS:                        11.6   5.0   )-----------( 141      ( 06 Mar 2019 00:35 )             34.1     Hgb Trend: 11.6<--, 11.2<--, 10.1<--, 11.9<--  03-06    141  |  99  |  47<H>  ----------------------------<  226<H>  3.9   |  32<H>  |  1.29    Ca    9.0      06 Mar 2019 00:50  Phos  3.6     03-06  Mg     2.0     03-06    TPro  5.1<L>  /  Alb  2.3<L>  /  TBili  0.5  /  DBili  x   /  AST  32  /  ALT  14  /  AlkPhos  67      Creatinine Trend: 1.29<--, 1.45<--, 1.52<--, 1.39<--  PT/INR - ( 06 Mar 2019 00:35 )   PT: 12.6 sec;   INR: 1.09 ratio         PTT - ( 06 Mar 2019 00:35 )  PTT:52.0 sec  Urinalysis Basic - ( 04 Mar 2019 15:12 )    Color: Yellow / Appearance: Clear / S.021 / pH: x  Gluc: x / Ketone: Negative  / Bili: Negative / Urobili: 2 mg/dL   Blood: x / Protein: 100 / Nitrite: Negative   Leuk Esterase: Negative / RBC: 2 /hpf / WBC 1 /HPF   Sq Epi: x / Non Sq Epi: 1 /hpf / Bacteria: Negative      Arterial Blood Gas:   @ 00:39  7.41/58/114/36/98/10.2  ABG lactate: --  Arterial Blood Gas:   @ 05:37  7.52/46/149/37/99/12.9  ABG lactate: --  Arterial Blood Gas:   @ 02:49  7.59/38/139/37/100/13.4  ABG lactate: --  Arterial Blood Gas:   @ 21:27  7.43/58/139/38/99/11.5  ABG lactate: --  Arterial Blood Gas:   @ 17:50  7.40/60/140/37/99/10.4  ABG lactate: --  Arterial Blood Gas:   @ 14:04  7.25/96/84/41/92/10.9  ABG lactate: --    Venous Blood Gas:   @ 11:39  7.21/104/36/40/41  VBG Lactate: 2.7      MICROBIOLOGY:     RADIOLOGY:  [ ] Reviewed and interpreted by me    EKG: CHIEF COMPLAINT: Hypercapnic Respiratory Failure s/p intubation s/p chf exacerbation vs PNA w noted Left sided pleural effusion    Interval Events: S/P overnight thoracentesis 750cc withdrawn. The pt became hypotensive w MAP 50's - 500cc IVF bolus given, U/o decreased to 10cc/hr- an additional 500cc/ IVF bolus administered w ample results.     REVIEW OF SYSTEMS:  [ x] Unable to assess ROS because _becomes agitated when awake/ not following direction _______    OBJECTIVE:  ICU Vital Signs Last 24 Hrs  T(C): 37.2 (06 Mar 2019 09:00), Max: 37.2 (06 Mar 2019 04:00)  T(F): 99 (06 Mar 2019 09:00), Max: 99 (06 Mar 2019 09:00)  HR: 87 (06 Mar 2019 09:25) (54 - 97)  BP: 113/54 (06 Mar 2019 09:00) (70/32 - 203/84)  BP(mean): 78 (06 Mar 2019 09:00) (46 - 120)  ABP: --  ABP(mean): --  RR: 18 (06 Mar 2019 09:25) (12 - 33)  SpO2: 100% (06 Mar 2019 09:25) (100% - 100%)    Mode: AC/ CMV 12/300/40%/+5  I & O's     @ 07:  -   @ 07:00  --------------------------------------------------------  IN: 3029 mL / OUT: 1155 mL / NET: 1874 mL     @ 07:01  -  0306 @ 09:32  --------------------------------------------------------  IN: 23.3 mL / OUT: 70 mL / NET: -46.7 mL      CAPILLARY BLOOD GLUCOSE  POCT Blood Glucose.: 144 mg/dL (06 Mar 2019 04:11)      PHYSICAL EXAM:  General: appears comfortable on Precedex w intermittent IVP of fentanyl for comfort   HEENT: normocephalic, atraumatic, trachea midline, oral mucosa pink / moist   Lymph Nodes: non palp  Neck: supple, neg JVD  Respiratory: Bilaterally equal BS & chest excursion, orally intubated, bilaterally clear BS , L pleural effusion on FOCUS  Cardiovascular: S1S2 Irregular, Afib, small pericardial effusion noted on BS Focus  Abdomen: non distended, +BS x 4  Extremities:   Skin:   Neurological:  Psychiatry:    LINES:    HOSPITAL MEDICATIONS:  MEDICATIONS  (STANDING):  ALBUTerol/ipratropium for Nebulization 3 milliLiter(s) Nebulizer every 6 hours  aspirin  chewable 81 milliGRAM(s) Oral daily  azithromycin  IVPB 500 milliGRAM(s) IV Intermittent every 24 hours  dexmedetomidine Infusion 0.2 MICROgram(s)/kG/Hr (2.385 mL/Hr) IV Continuous <Continuous>  heparin  Injectable 5000 Unit(s) SubCutaneous every 12 hours  insulin lispro (HumaLOG) corrective regimen sliding scale   SubCutaneous every 6 hours  lactated ringers. 1000 milliLiter(s) (100 mL/Hr) IV Continuous <Continuous>  piperacillin/tazobactam IVPB. 3.375 Gram(s) IV Intermittent every 12 hours  polyethylene glycol 3350 17 Gram(s) Oral daily  senna Syrup 10 milliLiter(s) Oral at bedtime  vancomycin  IVPB 750 milliGRAM(s) IV Intermittent every 24 hours    MEDICATIONS  (PRN):  ALBUTerol    90 MICROgram(s) HFA Inhaler 2 Puff(s) Inhalation every 12 hours PRN asthma  fentaNYL    Injectable 12.5 MICROGram(s) IV Push every 4 hours PRN agitation/pain      LABS:                        11.6   5.0   )-----------( 141      ( 06 Mar 2019 00:35 )             34.1     Hgb Trend: 11.6<--, 11.2<--, 10.1<--, 11.9<--  03-06    141  |  99  |  47<H>  ----------------------------<  226<H>  3.9   |  32<H>  |  1.29    Ca    9.0      06 Mar 2019 00:50  Phos  3.6     03-06  Mg     2.0     03-06    TPro  5.1<L>  /  Alb  2.3<L>  /  TBili  0.5  /  DBili  x   /  AST  32  /  ALT  14  /  AlkPhos  67      Creatinine Trend: 1.29<--, 1.45<--, 1.52<--, 1.39<--  PT/INR - ( 06 Mar 2019 00:35 )   PT: 12.6 sec;   INR: 1.09 ratio         PTT - ( 06 Mar 2019 00:35 )  PTT:52.0 sec  Urinalysis Basic - ( 04 Mar 2019 15:12 )    Color: Yellow / Appearance: Clear / S.021 / pH: x  Gluc: x / Ketone: Negative  / Bili: Negative / Urobili: 2 mg/dL   Blood: x / Protein: 100 / Nitrite: Negative   Leuk Esterase: Negative / RBC: 2 /hpf / WBC 1 /HPF   Sq Epi: x / Non Sq Epi: 1 /hpf / Bacteria: Negative      Arterial Blood Gas:   @ 00:39  7.41/58/114/36/98/10.2  ABG lactate: --  Arterial Blood Gas:   @ 05:37  7.52/46/149/37/99/12.9  ABG lactate: --  Arterial Blood Gas:   @ 02:49  7.59/38/139/37/100/13.4  ABG lactate: --  Arterial Blood Gas:   @ 21:27  7.43/58/139/38/99/11.5  ABG lactate: --  Arterial Blood Gas:   @ 17:50  7.40/60/140/37/99/10.4  ABG lactate: --  Arterial Blood Gas:   @ 14:04  7.25/96/84/41/92/10.9  ABG lactate: --    Venous Blood Gas:   @ 11:39  7.21/104/36/40/41  VBG Lactate: 2.7      MICROBIOLOGY:   Culture - Fungal, Body Fluid (19 @ 05:14)    Specimen Source: .Body Fluid Pleural Fluid    Culture Results:   Testing in progress    Culture - Body Fluid with Gram Stain (19 @ 05:14)    Gram Stain:   No polymorphonuclear cells seen  No organisms seen  by cytocentrifuge    Specimen Source: .Body Fluid Pleural Fluid    Culture - Acid Fast - Sputum w/Smear . (19 @ 12:51)    Specimen Source: .Sputum Sputum    Acid Fast Bacilli Smear:   No acid fast bacilli seen by fluorochrome stain    Culture Results:   Culture is being performed.    Culture - Bronchial (19 @ 22:17)    Gram Stain:   Numerous polymorphonuclear leukocytes per low power field  Rare Squamous Epithelial Cells per low power field  Few Gram Variable Rods per oil power field  Rare Gram positive cocci in pairs per oil power field    Specimen Source: Bronch Wash Combicath    Culture Results:   No growth to date.    Rapid Respiratory Viral Panel (19 @ 12:31)    Rapid RVP Result: NotDetec: The [employer] RVP Rapid uses polymerase chain reaction (PCR) and melt  curve analysis to screen for adenovirus; coronavirus HKU1, NL63, 229E,  OC43; human metapneumovirus (hMPV); human enterovirus/rhinovirus  (Entero/RV); influenza A; influenza A/H1;influenza A/H3; influenza  A/H1-2009; influenza B; parainfluenza viruses 1, 2, 3, 4; respiratory  syncytial virus; Bordetella pertussis; Mycoplasma pneumoniae; and  Chlamydophila pneumoniae.      RADIOLOGY:  < from: CT Chest No Cont (19 @ 14:43) >      PROCEDURE DATE:  2019            INTERPRETATION:  CLINICAL INFORMATION: Respiratory distress.    COMPARISON: CT abdomen pelvis from 2015.    PROCEDURE:   CT of the Chest was performed without intravenous contrast.  Sagittal and coronal reformats were performed.      FINDINGS:    CHEST:     LUNGS AND LARGE AIRWAYS: Patchy groundglass opacities of left upper lobe.   Left lower lobe compressive atelectasis.    Right upper lobe apical scarring and and bronchiectasis with subsegmental   atelectasis. Right middle lobe subsegmental atelectasis.    In the right lower lobe is mild bronchiectasis and patchy groundglass and   dense opacities. There are secretions within the right lower lobe   bronchus and the segmental bronchi, best shown in the superior segment of   the right lower lobe.    Calcified nodules in the right middle lobe and right upper lobe.    PLEURA: Moderate left and small right pleural effusions. The pleura is   thickened along the right hemithorax, finding best shown in the right   upper lobe. No pneumothorax.    VESSELS: Calcification of coronary arteries and thoracic aorta. The aorta   is tortuous and ectatic. The main pulmonary artery is enlarged which can   occur in the setting of pulmonary hypertension.    HEART: Cardiomegaly. No pericardial effusion. Annual calcification of   mitral and aortic valve.    MEDIASTINUM AND PABLO: No lymphadenopathy.    CHEST WALL AND LOWER NECK: Cachectic body habitus.    VISUALIZED UPPER ABDOMEN: Pneumobilia.    BONES: Degenerative changes.    IMPRESSION:     1.  Left pleural effusion and passive atelectasis of left lower lobe.  2.  Right upper lobe volume loss and bronchiectasis with patchy opacities   in the right upper lobe and to a lesser extent the right middle lobe and   right lower lobe can be the sequelae of prior scarring/infection.  3.  Small right pleural effusion.          EKG: CHIEF COMPLAINT: Hypercapnic Respiratory Failure s/p intubation s/p chf exacerbation vs PNA w noted Left sided pleural effusion    Interval Events: S/P overnight thoracentesis 750cc withdrawn. The pt became hypotensive w MAP 50's - 500cc IVF bolus given, U/o decreased to 10cc/hr- an additional 500cc/ IVF bolus administered w ample results.     REVIEW OF SYSTEMS:  [ x] Unable to assess ROS because _becomes agitated when awake/ not following direction _______    OBJECTIVE:  ICU Vital Signs Last 24 Hrs  T(C): 37.2 (06 Mar 2019 09:00), Max: 37.2 (06 Mar 2019 04:00)  T(F): 99 (06 Mar 2019 09:00), Max: 99 (06 Mar 2019 09:00)  HR: 87 (06 Mar 2019 09:25) (54 - 97)  BP: 113/54 (06 Mar 2019 09:00) (70/32 - 203/84)  BP(mean): 78 (06 Mar 2019 09:00) (46 - 120)  ABP: --  ABP(mean): --  RR: 18 (06 Mar 2019 09:25) (12 - 33)  SpO2: 100% (06 Mar 2019 09:25) (100% - 100%)    Mode: AC/ CMV 12/300/40%/+5  I & O's     @ 07:  -   @ 07:00  --------------------------------------------------------  IN: 3029 mL / OUT: 1155 mL / NET: 1874 mL     @ 07:01  -  0306 @ 09:32  --------------------------------------------------------  IN: 23.3 mL / OUT: 70 mL / NET: -46.7 mL      CAPILLARY BLOOD GLUCOSE  POCT Blood Glucose.: 144 mg/dL (06 Mar 2019 04:11)      PHYSICAL EXAM:  General: appears comfortable on Precedex w intermittent IVP of fentanyl for comfort   HEENT: normocephalic, atraumatic, trachea midline, oral mucosa pink / moist   Lymph Nodes: non palp  Neck: supple, neg JVD  Respiratory: Bilaterally equal BS & chest excursion, orally intubated, bilaterally clear BS , L pleural effusion on FOCUS  Cardiovascular: S1S2 Irregular, Afib, small pericardial effusion noted on BS Focus  Abdomen: non distended, +BS x 4  Extremities:   Skin:   Neurological:  Psychiatry:    LINES:    HOSPITAL MEDICATIONS:  MEDICATIONS  (STANDING):  ALBUTerol/ipratropium for Nebulization 3 milliLiter(s) Nebulizer every 6 hours  aspirin  chewable 81 milliGRAM(s) Oral daily  azithromycin  IVPB 500 milliGRAM(s) IV Intermittent every 24 hours  dexmedetomidine Infusion 0.2 MICROgram(s)/kG/Hr (2.385 mL/Hr) IV Continuous <Continuous>  heparin  Injectable 5000 Unit(s) SubCutaneous every 12 hours  insulin lispro (HumaLOG) corrective regimen sliding scale   SubCutaneous every 6 hours  lactated ringers. 1000 milliLiter(s) (100 mL/Hr) IV Continuous <Continuous>  piperacillin/tazobactam IVPB. 3.375 Gram(s) IV Intermittent every 12 hours  polyethylene glycol 3350 17 Gram(s) Oral daily  senna Syrup 10 milliLiter(s) Oral at bedtime  vancomycin  IVPB 750 milliGRAM(s) IV Intermittent every 24 hours    MEDICATIONS  (PRN):  ALBUTerol    90 MICROgram(s) HFA Inhaler 2 Puff(s) Inhalation every 12 hours PRN asthma  fentaNYL    Injectable 12.5 MICROGram(s) IV Push every 4 hours PRN agitation/pain      LABS:                        11.6   5.0   )-----------( 141      ( 06 Mar 2019 00:35 )             34.1     Hgb Trend: 11.6<--, 11.2<--, 10.1<--, 11.9<--  03-06    141  |  99  |  47<H>  ----------------------------<  226<H>  3.9   |  32<H>  |  1.29    Ca    9.0      06 Mar 2019 00:50  Phos  3.6     03-06  Mg     2.0     03-06    TPro  5.1<L>  /  Alb  2.3<L>  /  TBili  0.5  /  DBili  x   /  AST  32  /  ALT  14  /  AlkPhos  67      Creatinine Trend: 1.29<--, 1.45<--, 1.52<--, 1.39<--  PT/INR - ( 06 Mar 2019 00:35 )   PT: 12.6 sec;   INR: 1.09 ratio         PTT - ( 06 Mar 2019 00:35 )  PTT:52.0 sec  Urinalysis Basic - ( 04 Mar 2019 15:12 )    Color: Yellow / Appearance: Clear / S.021 / pH: x  Gluc: x / Ketone: Negative  / Bili: Negative / Urobili: 2 mg/dL   Blood: x / Protein: 100 / Nitrite: Negative   Leuk Esterase: Negative / RBC: 2 /hpf / WBC 1 /HPF   Sq Epi: x / Non Sq Epi: 1 /hpf / Bacteria: Negative      Arterial Blood Gas:   @ 00:39  7.41/58/114/36/98/10.2  ABG lactate: --  Arterial Blood Gas:   @ 05:37  7.52/46/149/37/99/12.9  ABG lactate: --  Arterial Blood Gas:   @ 02:49  7.59/38/139/37/100/13.4  ABG lactate: --  Arterial Blood Gas:   @ 21:27  7.43/58/139/38/99/11.5  ABG lactate: --  Arterial Blood Gas:   @ 17:50  7.40/60/140/37/99/10.4  ABG lactate: --  Arterial Blood Gas:   @ 14:04  7.25/96/84/41/92/10.9  ABG lactate: --    Venous Blood Gas:   @ 11:39  7.21/104/36/40/41  VBG Lactate: 2.7      MICROBIOLOGY:   Culture - Fungal, Body Fluid (19 @ 05:14)    Specimen Source: .Body Fluid Pleural Fluid    Culture Results:   Testing in progress    Culture - Body Fluid with Gram Stain (19 @ 05:14)    Gram Stain:   No polymorphonuclear cells seen  No organisms seen  by cytocentrifuge    Specimen Source: .Body Fluid Pleural Fluid    Culture - Acid Fast - Sputum w/Smear . (19 @ 12:51)    Specimen Source: .Sputum Sputum    Acid Fast Bacilli Smear:   No acid fast bacilli seen by fluorochrome stain    Culture Results:   Culture is being performed.    Culture - Bronchial (19 @ 22:17)    Gram Stain:   Numerous polymorphonuclear leukocytes per low power field  Rare Squamous Epithelial Cells per low power field  Few Gram Variable Rods per oil power field  Rare Gram positive cocci in pairs per oil power field    Specimen Source: Bronch Wash Combicath    Culture Results:   No growth to date.    Rapid Respiratory Viral Panel (19 @ 12:31)    Rapid RVP Result: NotDetec: The FilmArray RVP Rapid uses polymerase chain reaction (PCR) and melt  curve analysis to screen for adenovirus; coronavirus HKU1, NL63, 229E,  OC43; human metapneumovirus (hMPV); human enterovirus/rhinovirus  (Entero/RV); influenza A; influenza A/H1;influenza A/H3; influenza  A/H1-2009; influenza B; parainfluenza viruses 1, 2, 3, 4; respiratory  syncytial virus; Bordetella pertussis; Mycoplasma pneumoniae; and  Chlamydophila pneumoniae.      RADIOLOGY:  < from: CT Chest No Cont (19 @ 14:43) >      PROCEDURE DATE:  2019            INTERPRETATION:  CLINICAL INFORMATION: Respiratory distress.    COMPARISON: CT abdomen pelvis from 2015.    PROCEDURE:   CT of the Chest was performed without intravenous contrast.  Sagittal and coronal reformats were performed.      FINDINGS:    CHEST:     LUNGS AND LARGE AIRWAYS: Patchy groundglass opacities of left upper lobe.   Left lower lobe compressive atelectasis.    Right upper lobe apical scarring and and bronchiectasis with subsegmental   atelectasis. Right middle lobe subsegmental atelectasis.    In the right lower lobe is mild bronchiectasis and patchy groundglass and   dense opacities. There are secretions within the right lower lobe   bronchus and the segmental bronchi, best shown in the superior segment of   the right lower lobe.    Calcified nodules in the right middle lobe and right upper lobe.    PLEURA: Moderate left and small right pleural effusions. The pleura is   thickened along the right hemithorax, finding best shown in the right   upper lobe. No pneumothorax.    VESSELS: Calcification of coronary arteries and thoracic aorta. The aorta   is tortuous and ectatic. The main pulmonary artery is enlarged which can   occur in the setting of pulmonary hypertension.    HEART: Cardiomegaly. No pericardial effusion. Annual calcification of   mitral and aortic valve.    MEDIASTINUM AND PABLO: No lymphadenopathy.    CHEST WALL AND LOWER NECK: Cachectic body habitus.    VISUALIZED UPPER ABDOMEN: Pneumobilia.    BONES: Degenerative changes.    IMPRESSION:     1.  Left pleural effusion and passive atelectasis of left lower lobe.  2.  Right upper lobe volume loss and bronchiectasis with patchy opacities   in the right upper lobe and to a lesser extent the right middle lobe and   right lower lobe can be the sequelae of prior scarring/infection.  3.  Small right pleural effusion.      EKG:

## 2019-03-06 NOTE — PROGRESS NOTE ADULT - ATTENDING COMMENTS
1. resp failure - imrpved with thorac (transudate likely- ? sec t CHF? awating cytology results)- diurese and extubated today to bipap  2. bronchiectasis with evidence of chronic obstruction and CO2 retention - nebs -   cc time 35 mns

## 2019-03-06 NOTE — DIETITIAN INITIAL EVALUATION ADULT. - PHYSICAL APPEARANCE
Pt with adequate BMI, however appeared thin; Nutrition focused physical assessment not appropriate at this time as Pt is sedated/well nourished Nutrition focused physical assessment not appropriate at this time due to Pts positioning in bed/medical equipment/well nourished

## 2019-03-07 DIAGNOSIS — J90 PLEURAL EFFUSION, NOT ELSEWHERE CLASSIFIED: ICD-10-CM

## 2019-03-07 DIAGNOSIS — R78.81 BACTEREMIA: ICD-10-CM

## 2019-03-07 DIAGNOSIS — N17.9 ACUTE KIDNEY FAILURE, UNSPECIFIED: ICD-10-CM

## 2019-03-07 DIAGNOSIS — J96.02 ACUTE RESPIRATORY FAILURE WITH HYPERCAPNIA: ICD-10-CM

## 2019-03-07 DIAGNOSIS — I48.2 CHRONIC ATRIAL FIBRILLATION: ICD-10-CM

## 2019-03-07 DIAGNOSIS — N18.3 CHRONIC KIDNEY DISEASE, STAGE 3 (MODERATE): ICD-10-CM

## 2019-03-07 DIAGNOSIS — E43 UNSPECIFIED SEVERE PROTEIN-CALORIE MALNUTRITION: ICD-10-CM

## 2019-03-07 LAB
-  AMPICILLIN: SIGNIFICANT CHANGE UP
-  GENTAMICIN SYNERGY: SIGNIFICANT CHANGE UP
-  STREPTOMYCIN SYNERGY: SIGNIFICANT CHANGE UP
-  VANCOMYCIN: SIGNIFICANT CHANGE UP
ALBUMIN SERPL ELPH-MCNC: 2.4 G/DL — LOW (ref 3.3–5)
ALP SERPL-CCNC: 66 U/L — SIGNIFICANT CHANGE UP (ref 40–120)
ALT FLD-CCNC: 16 U/L — SIGNIFICANT CHANGE UP (ref 10–45)
ANION GAP SERPL CALC-SCNC: 12 MMOL/L — SIGNIFICANT CHANGE UP (ref 5–17)
APTT BLD: 36.2 SEC — SIGNIFICANT CHANGE UP (ref 27.5–36.3)
AST SERPL-CCNC: 35 U/L — SIGNIFICANT CHANGE UP (ref 10–40)
BASOPHILS # BLD AUTO: 0 K/UL — SIGNIFICANT CHANGE UP (ref 0–0.2)
BASOPHILS NFR BLD AUTO: 0 % — SIGNIFICANT CHANGE UP (ref 0–2)
BILIRUB SERPL-MCNC: 0.6 MG/DL — SIGNIFICANT CHANGE UP (ref 0.2–1.2)
BUN SERPL-MCNC: 38 MG/DL — HIGH (ref 7–23)
CALCIUM SERPL-MCNC: 9 MG/DL — SIGNIFICANT CHANGE UP (ref 8.4–10.5)
CHLORIDE SERPL-SCNC: 102 MMOL/L — SIGNIFICANT CHANGE UP (ref 96–108)
CO2 SERPL-SCNC: 29 MMOL/L — SIGNIFICANT CHANGE UP (ref 22–31)
CREAT SERPL-MCNC: 0.97 MG/DL — SIGNIFICANT CHANGE UP (ref 0.5–1.3)
EOSINOPHIL # BLD AUTO: 0 K/UL — SIGNIFICANT CHANGE UP (ref 0–0.5)
EOSINOPHIL NFR BLD AUTO: 0.8 % — SIGNIFICANT CHANGE UP (ref 0–6)
GAS PNL BLDA: SIGNIFICANT CHANGE UP
GLUCOSE SERPL-MCNC: 201 MG/DL — HIGH (ref 70–99)
HCT VFR BLD CALC: 30.2 % — LOW (ref 34.5–45)
HGB BLD-MCNC: 10.1 G/DL — LOW (ref 11.5–15.5)
INR BLD: 1.13 RATIO — SIGNIFICANT CHANGE UP (ref 0.88–1.16)
LYMPHOCYTES # BLD AUTO: 0.5 K/UL — LOW (ref 1–3.3)
LYMPHOCYTES # BLD AUTO: 10.4 % — LOW (ref 13–44)
MAGNESIUM SERPL-MCNC: 2.1 MG/DL — SIGNIFICANT CHANGE UP (ref 1.6–2.6)
MCHC RBC-ENTMCNC: 32.9 PG — SIGNIFICANT CHANGE UP (ref 27–34)
MCHC RBC-ENTMCNC: 33.5 GM/DL — SIGNIFICANT CHANGE UP (ref 32–36)
MCV RBC AUTO: 98.3 FL — SIGNIFICANT CHANGE UP (ref 80–100)
METHOD TYPE: SIGNIFICANT CHANGE UP
MONOCYTES # BLD AUTO: 0.4 K/UL — SIGNIFICANT CHANGE UP (ref 0–0.9)
MONOCYTES NFR BLD AUTO: 8.3 % — SIGNIFICANT CHANGE UP (ref 2–14)
NEUTROPHILS # BLD AUTO: 3.5 K/UL — SIGNIFICANT CHANGE UP (ref 1.8–7.4)
NEUTROPHILS NFR BLD AUTO: 80.5 % — HIGH (ref 43–77)
PHOSPHATE SERPL-MCNC: 3.6 MG/DL — SIGNIFICANT CHANGE UP (ref 2.5–4.5)
PLATELET # BLD AUTO: 140 K/UL — LOW (ref 150–400)
POTASSIUM SERPL-MCNC: 4.8 MMOL/L — SIGNIFICANT CHANGE UP (ref 3.5–5.3)
POTASSIUM SERPL-SCNC: 4.8 MMOL/L — SIGNIFICANT CHANGE UP (ref 3.5–5.3)
PROT SERPL-MCNC: 5.4 G/DL — LOW (ref 6–8.3)
PROTHROM AB SERPL-ACNC: 13 SEC — HIGH (ref 10–12.9)
RBC # BLD: 3.07 M/UL — LOW (ref 3.8–5.2)
RBC # FLD: 15.6 % — HIGH (ref 10.3–14.5)
SODIUM SERPL-SCNC: 143 MMOL/L — SIGNIFICANT CHANGE UP (ref 135–145)
VANCOMYCIN TROUGH SERPL-MCNC: 10.3 UG/ML — SIGNIFICANT CHANGE UP (ref 10–20)
WBC # BLD: 4.4 K/UL — SIGNIFICANT CHANGE UP (ref 3.8–10.5)
WBC # FLD AUTO: 4.4 K/UL — SIGNIFICANT CHANGE UP (ref 3.8–10.5)

## 2019-03-07 PROCEDURE — 99233 SBSQ HOSP IP/OBS HIGH 50: CPT | Mod: GC

## 2019-03-07 PROCEDURE — 99223 1ST HOSP IP/OBS HIGH 75: CPT | Mod: GC

## 2019-03-07 PROCEDURE — 99223 1ST HOSP IP/OBS HIGH 75: CPT

## 2019-03-07 PROCEDURE — 93010 ELECTROCARDIOGRAM REPORT: CPT

## 2019-03-07 PROCEDURE — 74177 CT ABD & PELVIS W/CONTRAST: CPT | Mod: 26

## 2019-03-07 PROCEDURE — 71045 X-RAY EXAM CHEST 1 VIEW: CPT | Mod: 26

## 2019-03-07 RX ORDER — GLUCAGON INJECTION, SOLUTION 0.5 MG/.1ML
1 INJECTION, SOLUTION SUBCUTANEOUS ONCE
Qty: 0 | Refills: 0 | Status: DISCONTINUED | OUTPATIENT
Start: 2019-03-07 | End: 2019-03-09

## 2019-03-07 RX ORDER — SITAGLIPTIN 50 MG/1
1 TABLET, FILM COATED ORAL
Qty: 0 | Refills: 0 | COMMUNITY

## 2019-03-07 RX ORDER — VANCOMYCIN HCL 1 G
1000 VIAL (EA) INTRAVENOUS EVERY 24 HOURS
Qty: 0 | Refills: 0 | Status: DISCONTINUED | OUTPATIENT
Start: 2019-03-07 | End: 2019-03-07

## 2019-03-07 RX ORDER — DIGOXIN 250 MCG
1 TABLET ORAL
Qty: 0 | Refills: 0 | COMMUNITY

## 2019-03-07 RX ORDER — INSULIN LISPRO 100/ML
VIAL (ML) SUBCUTANEOUS EVERY 6 HOURS
Qty: 0 | Refills: 0 | Status: DISCONTINUED | OUTPATIENT
Start: 2019-03-07 | End: 2019-03-11

## 2019-03-07 RX ORDER — SODIUM CHLORIDE 9 MG/ML
1000 INJECTION, SOLUTION INTRAVENOUS
Qty: 0 | Refills: 0 | Status: DISCONTINUED | OUTPATIENT
Start: 2019-03-07 | End: 2019-03-09

## 2019-03-07 RX ORDER — METOPROLOL TARTRATE 50 MG
25 TABLET ORAL
Qty: 0 | Refills: 0 | Status: DISCONTINUED | OUTPATIENT
Start: 2019-03-07 | End: 2019-03-07

## 2019-03-07 RX ORDER — METOPROLOL TARTRATE 50 MG
1 TABLET ORAL
Qty: 0 | Refills: 0 | COMMUNITY

## 2019-03-07 RX ORDER — DEXTROSE 50 % IN WATER 50 %
50 SYRINGE (ML) INTRAVENOUS ONCE
Qty: 0 | Refills: 0 | Status: COMPLETED | OUTPATIENT
Start: 2019-03-07 | End: 2019-03-07

## 2019-03-07 RX ORDER — AMLODIPINE BESYLATE 2.5 MG/1
1 TABLET ORAL
Qty: 0 | Refills: 0 | COMMUNITY

## 2019-03-07 RX ORDER — SIMVASTATIN 20 MG/1
1 TABLET, FILM COATED ORAL
Qty: 0 | Refills: 0 | COMMUNITY

## 2019-03-07 RX ORDER — FUROSEMIDE 40 MG
1 TABLET ORAL
Qty: 0 | Refills: 0 | COMMUNITY

## 2019-03-07 RX ORDER — DEXTROSE 50 % IN WATER 50 %
25 SYRINGE (ML) INTRAVENOUS ONCE
Qty: 0 | Refills: 0 | Status: DISCONTINUED | OUTPATIENT
Start: 2019-03-07 | End: 2019-03-17

## 2019-03-07 RX ORDER — METOPROLOL TARTRATE 50 MG
2.5 TABLET ORAL EVERY 6 HOURS
Qty: 0 | Refills: 0 | Status: DISCONTINUED | OUTPATIENT
Start: 2019-03-07 | End: 2019-03-08

## 2019-03-07 RX ORDER — DEXTROSE 50 % IN WATER 50 %
12.5 SYRINGE (ML) INTRAVENOUS ONCE
Qty: 0 | Refills: 0 | Status: DISCONTINUED | OUTPATIENT
Start: 2019-03-07 | End: 2019-03-09

## 2019-03-07 RX ORDER — PIOGLITAZONE HYDROCHLORIDE 15 MG/1
1 TABLET ORAL
Qty: 0 | Refills: 0 | COMMUNITY

## 2019-03-07 RX ORDER — SPIRONOLACTONE 25 MG/1
1 TABLET, FILM COATED ORAL
Qty: 0 | Refills: 0 | COMMUNITY

## 2019-03-07 RX ORDER — INSULIN LISPRO 100/ML
VIAL (ML) SUBCUTANEOUS EVERY 4 HOURS
Qty: 0 | Refills: 0 | Status: DISCONTINUED | OUTPATIENT
Start: 2019-03-07 | End: 2019-03-07

## 2019-03-07 RX ORDER — DEXTROSE 50 % IN WATER 50 %
15 SYRINGE (ML) INTRAVENOUS ONCE
Qty: 0 | Refills: 0 | Status: DISCONTINUED | OUTPATIENT
Start: 2019-03-07 | End: 2019-03-09

## 2019-03-07 RX ADMIN — Medication 1: at 05:12

## 2019-03-07 RX ADMIN — Medication 2.5 MILLIGRAM(S): at 23:19

## 2019-03-07 RX ADMIN — FENTANYL CITRATE 12.5 MICROGRAM(S): 50 INJECTION INTRAVENOUS at 00:33

## 2019-03-07 RX ADMIN — HEPARIN SODIUM 5000 UNIT(S): 5000 INJECTION INTRAVENOUS; SUBCUTANEOUS at 17:00

## 2019-03-07 RX ADMIN — SODIUM CHLORIDE 30 MILLILITER(S): 9 INJECTION, SOLUTION INTRAVENOUS at 09:59

## 2019-03-07 RX ADMIN — Medication 50 MILLILITER(S): at 09:59

## 2019-03-07 RX ADMIN — Medication 3 MILLILITER(S): at 14:37

## 2019-03-07 RX ADMIN — Medication 3 MILLILITER(S): at 23:19

## 2019-03-07 RX ADMIN — Medication 250 MILLIGRAM(S): at 03:32

## 2019-03-07 RX ADMIN — Medication 2: at 00:32

## 2019-03-07 RX ADMIN — Medication 3 MILLILITER(S): at 17:00

## 2019-03-07 RX ADMIN — PIPERACILLIN AND TAZOBACTAM 25 GRAM(S): 4; .5 INJECTION, POWDER, LYOPHILIZED, FOR SOLUTION INTRAVENOUS at 05:13

## 2019-03-07 RX ADMIN — Medication 3 MILLILITER(S): at 05:14

## 2019-03-07 RX ADMIN — AZITHROMYCIN 250 MILLIGRAM(S): 500 TABLET, FILM COATED ORAL at 14:37

## 2019-03-07 RX ADMIN — HALOPERIDOL DECANOATE 2.5 MILLIGRAM(S): 100 INJECTION INTRAMUSCULAR at 00:00

## 2019-03-07 RX ADMIN — Medication 2.5 MILLIGRAM(S): at 17:46

## 2019-03-07 RX ADMIN — SODIUM CHLORIDE 30 MILLILITER(S): 9 INJECTION, SOLUTION INTRAVENOUS at 14:37

## 2019-03-07 RX ADMIN — Medication 3 MILLILITER(S): at 00:06

## 2019-03-07 RX ADMIN — PIPERACILLIN AND TAZOBACTAM 25 GRAM(S): 4; .5 INJECTION, POWDER, LYOPHILIZED, FOR SOLUTION INTRAVENOUS at 17:00

## 2019-03-07 RX ADMIN — HEPARIN SODIUM 5000 UNIT(S): 5000 INJECTION INTRAVENOUS; SUBCUTANEOUS at 05:13

## 2019-03-07 NOTE — PROGRESS NOTE ADULT - PROBLEM SELECTOR PLAN 3
Exudative based on lights criteria.  Culture with no growth but after antibiotics had already been initiated.  Respiratory status is improved after tap.    Repeat chest xray today to evaluate Exudative based on lights criteria.  Culture with no growth but after antibiotics had already been initiated.  Respiratory status is improved after tap.    Repeat chest xray today to evaluate  Offical ID recs

## 2019-03-07 NOTE — CONSULT NOTE ADULT - ASSESSMENT
89F with Alzheimer dementia, Afib, CHF, Asthma,   progressive SOB with lethargy over 3 to 4 days with development of productive cough over past 24 hr.   lower extremity edema   hypercapnic resp failure 2/2 to r/o CAP vs ADHF 89F with Alzheimer dementia, Afib, CHF and Asthma admitted 3/4/19 with hypoxic and hypercapnic respiratory failure, was intubated and admitted to MICU, managed for possible CHF and/or pneumonia. s/p thoracentesis for a left effusion 3/6/19, slightly exudative. Extubated and downgraded from MICU.   Blood cultures from 3/4/19 have grown Enterococcus faecalis and alpha Strep from one of four bottles. BioFire detected CoNS as well but it has not grown so far - check with micro and they will check again. If a true polymicrobial bacteremia would be concerned for a GI focus. Repeat cultures negative.   BAL and pleural fluid without bacterial growth suggesting noninfectious causes for respiratory failure.   Afebrile and nontoxic but had an episode of hypothermia to 94.9F 3/5 night.     Recommend  -CT abdomen pelvis with IV contrast if able to   -stop Vancomycin and Azithromycin  -continue Zosyn     Discussed with primary team 89F with Alzheimer dementia, Afib, CHF and Asthma admitted 3/4/19 with hypoxic and hypercapnic respiratory failure, was intubated and admitted to MICU, managed for possible CHF and/or pneumonia. s/p thoracentesis for a left effusion 3/6/19, slightly exudative. Extubated and downgraded from MICU.   Blood cultures from 3/4/19 have grown Enterococcus faecalis and alpha Strep from one of four bottles. BioFire detected CoNS as well but it has not grown so far - check with micro and they will check again. If a true polymicrobial bacteremia would be concerned for a GI focus. Repeat cultures negative.   BAL and pleural fluid without bacterial growth    Afebrile and nontoxic but had an episode of hypothermia to 94.9F 3/5 night.     Recommend  -CT abdomen pelvis with IV contrast if able to to find source of infection  -Stop Vancomycin and Azithromycin  -Continue Zosyn     Discussed with primary team

## 2019-03-07 NOTE — PROGRESS NOTE ADULT - ATTENDING COMMENTS
MED RECONCILIATION COMPLETED    Palliative care consult for goals of care discussion.  Multiple family members decision making

## 2019-03-07 NOTE — PROGRESS NOTE ADULT - PROBLEM SELECTOR PLAN 4
Improved,  Prerenal etiology  Now on LR D5 30 cc/hr   Awaiting official bedside dysphagia   Speech and Swallow

## 2019-03-07 NOTE — PROGRESS NOTE ADULT - ASSESSMENT
89 yr old female with PMHx of Alzheimer's HTN, Afib, HTN, CHF ( EF from 2015 of 62%), Asthma, a/w hypercapnic respiratory failure s/p intubation 2/2 CHF exacerbation vs /and / or PNA w a noted significant L sided pleural effusion.    Plan:    #Neuro:  -neuro checks as per protocol  -c/w Precedex- low dose - titrate to RASS 0 - -2  -pain/sedation with fentanyl 12.5 mcg IV q 4 hrs prn      #Pulm:  Hypercapnic resp failure s/p intubation- bronchiectasis with evidence of chronic obstruction and CO2 retention -- , extubated yesterday  -c/w duoneb therapy q 6 hrs  -pt on home albuterol neb therapy q 12 hrs  -continue albuterol neb therapy q 12 hrs and prn sob/wheezes  -HOB >/= 30 degree angle  -chest PT q 2 hrs      #CV:  Hx of CHF (EF from 2015 62%)  -obtain duplex of lower extremities to r/o DVT (LE edema Lt>Rt)  -pt with hx of afib on digoxin- level 2- c/w holding   -not on anticoagulation at home in the setting of advanced age / Alzheimer's and high risk of fallst  -obtain TTE to eval progression of heart disease  -given elevated BNP , hx CHF on chronic Lasix - continue lasix 40 mg IV q 12 hrs - keep output 500 cc/hr      #GI/:  -dietician consult  -while NPO - protonix 40 mg IV qd  -Pt with Hx of VANIA/CKD - currently with sCr 1.29- improving slowly  -strict I & O's keep even       #ID: S/P L thoracentesis- borderline exudative  -Combi cath bronch cultures 3/4 coag neg staph/ enterococcus species/strep non Group A,B or Strep pneumoniae  -F/U urine legionella- negative  -zosyn 3.375 gm q 8 hrs   -zithromax 500 mg IV qday  -c/w vanco 89 yr old female with PMHx of Alzheimer's HTN, Afib, HTN, CHF ( EF from 2015 of 62%), Asthma, a/w hypercapnic respiratory failure s/p intubation 2/2 CHF exacerbation vs /and / or PNA w a noted significant L sided pleural effusion.    Plan:    #Neuro:  -neuro checks as per protocol  -fall precautions   -pt eval      #Pulm:  Hypercapnic resp failure s/p intubation- bronchiectasis with evidence of chronic obstruction and CO2 retention -- , extubated yesterday  -c/w duoneb therapy q 6 hrs  -pt on home albuterol neb therapy q 12 hrs  -continue albuterol neb therapy q 12 hrs and prn sob/wheezes  -HOB >/= 30 degree angle  -chest PT q 2 hrs      #CV:  Hx of CHF (EF from 2015 62%)  -recheck digoxin level- was elevated-  would restart when appropriate  -not on anticoagulation at home in the setting of advanced age / Alzheimer's and high risk of fallst  -f/u Echo  -hx CHF on chronic Lasix - evaluate pt for lasix dosing daily      #GI  -dietician consult  -NPO for now-   -speech and swallow eval by pathology  -speak to pt family / HCP / if they are agreeable to an NGT placement if pt fails her official BS swallow by speech pathology      Gu:  VANIA resolved  -d/c vieyra  -strict I & O's keep even       #ID: S/P L thoracentesis- borderline exudative  -Combi cath bronch cultures 3/4 coag neg staph/ enterococcus species/strep non Group A,B or Strep pneumoniae  -F/U urine legionella- negative - D/C Azithromycin  -zosyn 3.375 gm q 8 hrs   -c/w vanco    OOB to chair  PT consult

## 2019-03-07 NOTE — CONSULT NOTE ADULT - SUBJECTIVE AND OBJECTIVE BOX
HPI:  89F with Alzheimer dementia, Afib, CHF, Asthma,   progressive SOB with lethargy over 3 to 4 days with development of productive cough over past 24 hr.   lower extremity edema   hypercapnic resp failure 2/2 to r/o CAP vs ADHF    PAST MEDICAL & SURGICAL HISTORY:  Arthritis  Asthma  Diabetes  Hypertension  Alzheimer's dementia  H/O abdominal surgery      FAMILY HISTORY:  No pertinent family history in first degree relatives      SOCIAL HISTORY:  Smoking: [ ] Never Smoked [ ] Former Smoker (__ packs x ___ years) [ ] Current Smoker  (__ packs x ___ years)  Substance Use: [ ] Never Used [ ] Used ____  EtOH Use:  Marital Status: [ ] Single [ ]  [ ]  [ ]   Sexual History:   Occupation:  Recent Travel:  Country of Birth:  Advance Directives:    Allergies    No Known Allergies    Intolerances        HOME MEDICATIONS:    REVIEW OF SYSTEMS:  obtained from family    CONSTITUTIONAL: + weakness, no fevers or chills  EYES/ENT: No visual changes;  No vertigo or throat pain   NECK: No pain or stiffness  RESPIRATORY: + cough, no wheezing, hemoptysis;+ shortness of breath  CARDIOVASCULAR: No chest pain or palpitations  GASTROINTESTINAL: No abdominal or epigastric pain. No nausea, vomiting, or hematemesis; No diarrhea or constipation. No melena or hematochezia.  GENITOURINARY: No dysuria, frequency or hematuria  NEUROLOGICAL: No numbness or weakness  SKIN: No itching, rashes      OBJECTIVE:  ICU Vital Signs Last 24 Hrs  T(C): --  T(F): --  HR: 61 (04 Mar 2019 12:41) (61 - 62)  BP: 147/49 (04 Mar 2019 12:25) (126/78 - 147/49)  BP(mean): --  ABP: --  ABP(mean): --  RR: 19 (04 Mar 2019 12:25) (19 - 19)  SpO2: 99% (04 Mar 2019 12:41) (96% - 100%)    CURRENT VITAL SIGNS:  BP: 149/49; HR: 60 (afib); RR: 22 on Bipap therapy with SpVt 250 -300; SPO2: 100% on 40% FIO2    CAPILLARY BLOOD GLUCOSE          PHYSICAL EXAM:  GENERAL: NAD, well-groomed, well-developed  HEAD:  Atraumatic, Normocephalic  EYES: EOMI, PERRLA, conjunctiva and sclera clear  ENMT: No oropharyngeal exudates, erythema or lesions,  Moist mucous membranes  NECK: Supple, no cervical lymphadenopathy, no JVD  NERVOUS SYSTEM:  Lethargic , with tactile stimuli has spontaneous purposeful movement of all 4 extremites 4/5 BUE and BLE motor strength, full sensation to light touch   CHEST/LUNG: Breath sounds bilat, diminished in lower lung fields bases to 1/4 up in Lt, 1/3 up in Rt. few bibasilar crackles  HEART: Afib ventricular response of 60; S1/S2 I?VI sys murmur, without rubs, or gallops  ABDOMEN: Soft, Nontender, Nondistended; Bowel sounds present, Bladder non distended, non palpable  EXTREMITIES: Pulses palpable radial pulses 2+ bilat, DP/PT 1+/1+ bilat, without clubbing,cyanosis. Digits of feet slight cool to touch with cap refill > 3 secs< 6 secs, digits of hands warm to touch with good cap refill < 3 sec. Lower extremities with 1+ pitting edema in RLE, 2+ pitting in LLE  Skin: warm, dry, intact, normal color, no rash or abnormal lesions,without palpable nodes      HOSPITAL MEDICATIONS:  MEDICATIONS  (STANDING):  azithromycin  IVPB 500 milliGRAM(s) IV Intermittent Once  furosemide   Injectable 40 milliGRAM(s) IV Push Once  piperacillin/tazobactam IVPB. 3.375 Gram(s) IV Intermittent once  vancomycin  IVPB 1000 milliGRAM(s) IV Intermittent once    MEDICATIONS  (PRN):      LABS:                        11.9   6.6   )-----------( 233      ( 04 Mar 2019 11:40 )             37.1     Hgb Trend: 11.9<--  03-04    144  |  99  |  47<H>  ----------------------------<  269<H>  5.1   |  31  |  1.39<H>    Ca    9.9      04 Mar 2019 11:40    TPro  7.4  /  Alb  3.6  /  TBili  0.7  /  DBili  x   /  AST  36  /  ALT  19  /  AlkPhos  106  03-04    Creatinine Trend: 1.39<--  PT/INR - ( 04 Mar 2019 11:40 )   PT: 11.9 sec;   INR: 1.03 ratio         PTT - ( 04 Mar 2019 11:40 )  PTT:27.5 sec      Venous Blood Gas:  03-04 @ 11:39  7.21/104/36/40/41  VBG Lactate: 2.7      MICROBIOLOGY:     RADIOLOGY:  [ ] Reviewed and interpreted by me    EKG: (04 Mar 2019 17:42)      PAST MEDICAL & SURGICAL HISTORY:  Arthritis  Asthma  Diabetes  Hypertension  Alzheimer's dementia  H/O abdominal surgery      Allergies    No Known Allergies    Intolerances        ANTIMICROBIALS:  azithromycin  IVPB 500 every 24 hours  piperacillin/tazobactam IVPB. 3.375 every 12 hours  vancomycin  IVPB 1000 every 24 hours      OTHER MEDS:  ALBUTerol    90 MICROgram(s) HFA Inhaler 2 Puff(s) Inhalation every 12 hours PRN  ALBUTerol/ipratropium for Nebulization 3 milliLiter(s) Nebulizer every 6 hours  aspirin  chewable 81 milliGRAM(s) Oral daily  dextrose 5% + lactated ringers. 1000 milliLiter(s) IV Continuous <Continuous>  heparin  Injectable 5000 Unit(s) SubCutaneous every 12 hours  polyethylene glycol 3350 17 Gram(s) Oral daily  senna Syrup 10 milliLiter(s) Oral at bedtime      SOCIAL HISTORY:    FAMILY HISTORY:  No pertinent family history in first degree relatives    No pertinent family history in relation to chief complaint     ROS:  Unobtainable because:   All other systems negative   Constitutional: no fever, no chills, no weight loss, no night sweats  HEENT:  no vision changes, no sore throat, no rhinorrhea  Cardiovascular:  no chest pain, no palpitation  Respiratory:  no SOB, no cough  GI:  no abd pain, no vomiting, no diarrhea  urinary: no dysuria, no hematuria, no flank pain  musculoskeletal:  no joint pain, no joint swelling  skin:  no rash  neurology:  no headache, no seizure, no change in mental status  heme: no bleeding    Physical Exam:  General:    NAD, non toxic, A&O x3  HEENT:   no oropharyngeal lesions, no LAD, neck supple  Cardiovascular:    regular rate and rhythm   Respiratory: nonlabored on room air, clear bilaterally, no wheezing  abd:   soft, bowel sounds present, not tender, no hepatosplenomegaly  :     no CVAT, no spurapubic tenderness, no vieyra  Musculoskeletal : no joint swelling  Skin:    no rash  Neurologic:     no focal deficits  Vascular: no edema, no phlebitis   Psych: normal affect    Drug Dosing Weight  Height (cm): 154.9 (04 Mar 2019 17:27)  Weight (kg): 47.7 (04 Mar 2019 20:00)  BMI (kg/m2): 19.9 (04 Mar 2019 20:00)  BSA (m2): 1.44 (04 Mar 2019 20:00)    Vital Signs Last 24 Hrs  T(F): 98 (03-07-19 @ 11:02), Max: 99.2 (03-05-19 @ 04:00)    Vital Signs Last 24 Hrs  HR: 62 (03-07-19 @ 11:17) (48 - 85)  BP: 124/59 (03-07-19 @ 11:02) (98/58 - 164/67)  RR: 19 (03-07-19 @ 11:02)  SpO2: 99% (03-07-19 @ 11:17) (89% - 100%)  Wt(kg): --                          10.1   4.4   )-----------( 140      ( 07 Mar 2019 00:34 )             30.2       03-07    143  |  102  |  38<H>  ----------------------------<  201<H>  4.8   |  29  |  0.97    Ca    9.0      07 Mar 2019 00:34  Phos  3.6     03-07  Mg     2.1     03-07    TPro  5.4<L>  /  Alb  2.4<L>  /  TBili  0.6  /  DBili  x   /  AST  35  /  ALT  16  /  AlkPhos  66  03-07          MICROBIOLOGY:  Vancomycin Level, Trough: 10.3 ug/mL (03-07-19 @ 00:34)  v  .Body Fluid Pleural Fluid  03-06-19   No growth  --    No polymorphonuclear cells seen  No organisms seen  by cytocentrifuge      .Blood Blood  03-06-19   No growth to date.  --  --      .Sputum Sputum  03-05-19   Culture is being performed.  --  --      Bronch Wash Combicath  03-04-19   Normal Respiratory Anna present  --    Numerous polymorphonuclear leukocytes per low power field  Rare Squamous Epithelial Cells per low power field  Few Gram Variable Rods per oil power field  Rare Gram positive cocci in pairs per oil power field      .Urine Catheterized  03-04-19   No growth  --  --      .Blood Blood-Peripheral  03-04-19   Growth in aerobic bottle: Streptococcus species  "Due to technical problems, Proteus sp. will Not be reported as part of  the BCID panel until further notice"  ***Blood Panel PCR results on this specimen are available  approximately 3 hours after the Gram stain result.***  Gram stain, PCR, and/or culture results may not always  correspond due to difference in methodologies.  ************************************************************  This PCR assay was performed using [employer].  The following targets are tested for: Enterococcus,  vancomycin resistant enterococci, Listeria monocytogenes,  coagulase negative staphylococci, S. aureus,  methicillin resistant S. aureus, Streptococcus agalactiae  (Group B), S. pneumoniae, S. pyogenes (Group A),  Acinetobacter baumannii, Enterobacter cloacae, E. coli,  Klebsiella oxytoca, K. pneumoniae, Proteus sp.,  Serratia marcescens, Haemophilus influenzae,  Neisseria meningitidis, Pseudomonas aeruginosa, Candida  albicans, C. glabrata, C krusei, C parapsilosis,  C. tropicalis and the KPC resistance gene.  --  Blood Culture PCR          Rapid RVP Result: NotDetec (03-04 @ 12:31)    RADIOLOGY:  VA Duplex Lower Ext Vein Scan, Bilat (03.05.19 @ 15:30)   No evidence of acute DVT in the bilateral lower extremity extremities.  Chronic wall thickening involves the right common femoral and proximal to mid femoral veins.    Xray Chest 1 View- PORTABLE-Urgent (03.04.19 @ 18:27)   1.  Right lung curvilinear and hazy opacity is grossly unchanged from prior study. Probably compatible with the fissure.  2.  Unchanged bilateral pleural effusion.  3.  Lines and tubes as described above.    CT Chest No Cont (03.04.19 @ 14:43)   1.  Left pleural effusion and passive atelectasis of left lower lobe.  2.  Right upper lobe volume loss and bronchiectasis with patchy opacities in the right upper lobe and to a lesser extent the right middle lobe and right lower lobe can be the sequelae of prior scarring/infection.  3.  Small right pleural effusion. HPI:  89F with Alzheimer dementia, Afib, CHF, Asthma,   progressive SOB with lethargy over 3 to 4 days with development of productive cough over past 24 hr.   lower extremity edema   hypercapnic resp failure 2/2 to r/o CAP vs ADHF    PAST MEDICAL & SURGICAL HISTORY:  Arthritis  Asthma  Diabetes  Hypertension  Alzheimer's dementia  H/O abdominal surgery      FAMILY HISTORY:  No pertinent family history in first degree relatives      SOCIAL HISTORY:  Smoking: [ ] Never Smoked [ ] Former Smoker (__ packs x ___ years) [ ] Current Smoker  (__ packs x ___ years)  Substance Use: [ ] Never Used [ ] Used ____  EtOH Use:  Marital Status: [ ] Single [ ]  [ ]  [ ]   Sexual History:   Occupation:  Recent Travel:  Country of Birth:  Advance Directives:    Allergies    No Known Allergies    Intolerances        HOME MEDICATIONS:    REVIEW OF SYSTEMS:  obtained from family    CONSTITUTIONAL: + weakness, no fevers or chills  EYES/ENT: No visual changes;  No vertigo or throat pain   NECK: No pain or stiffness  RESPIRATORY: + cough, no wheezing, hemoptysis;+ shortness of breath  CARDIOVASCULAR: No chest pain or palpitations  GASTROINTESTINAL: No abdominal or epigastric pain. No nausea, vomiting, or hematemesis; No diarrhea or constipation. No melena or hematochezia.  GENITOURINARY: No dysuria, frequency or hematuria  NEUROLOGICAL: No numbness or weakness  SKIN: No itching, rashes      OBJECTIVE:  ICU Vital Signs Last 24 Hrs  T(C): --  T(F): --  HR: 61 (04 Mar 2019 12:41) (61 - 62)  BP: 147/49 (04 Mar 2019 12:25) (126/78 - 147/49)  BP(mean): --  ABP: --  ABP(mean): --  RR: 19 (04 Mar 2019 12:25) (19 - 19)  SpO2: 99% (04 Mar 2019 12:41) (96% - 100%)    CURRENT VITAL SIGNS:  BP: 149/49; HR: 60 (afib); RR: 22 on Bipap therapy with SpVt 250 -300; SPO2: 100% on 40% FIO2    CAPILLARY BLOOD GLUCOSE          PHYSICAL EXAM:  GENERAL: NAD, well-groomed, well-developed  HEAD:  Atraumatic, Normocephalic  EYES: EOMI, PERRLA, conjunctiva and sclera clear  ENMT: No oropharyngeal exudates, erythema or lesions,  Moist mucous membranes  NECK: Supple, no cervical lymphadenopathy, no JVD  NERVOUS SYSTEM:  Lethargic , with tactile stimuli has spontaneous purposeful movement of all 4 extremites 4/5 BUE and BLE motor strength, full sensation to light touch   CHEST/LUNG: Breath sounds bilat, diminished in lower lung fields bases to 1/4 up in Lt, 1/3 up in Rt. few bibasilar crackles  HEART: Afib ventricular response of 60; S1/S2 I?VI sys murmur, without rubs, or gallops  ABDOMEN: Soft, Nontender, Nondistended; Bowel sounds present, Bladder non distended, non palpable  EXTREMITIES: Pulses palpable radial pulses 2+ bilat, DP/PT 1+/1+ bilat, without clubbing,cyanosis. Digits of feet slight cool to touch with cap refill > 3 secs< 6 secs, digits of hands warm to touch with good cap refill < 3 sec. Lower extremities with 1+ pitting edema in RLE, 2+ pitting in LLE  Skin: warm, dry, intact, normal color, no rash or abnormal lesions,without palpable nodes      HOSPITAL MEDICATIONS:  MEDICATIONS  (STANDING):  azithromycin  IVPB 500 milliGRAM(s) IV Intermittent Once  furosemide   Injectable 40 milliGRAM(s) IV Push Once  piperacillin/tazobactam IVPB. 3.375 Gram(s) IV Intermittent once  vancomycin  IVPB 1000 milliGRAM(s) IV Intermittent once    MEDICATIONS  (PRN):      LABS:                        11.9   6.6   )-----------( 233      ( 04 Mar 2019 11:40 )             37.1     Hgb Trend: 11.9<--  03-04    144  |  99  |  47<H>  ----------------------------<  269<H>  5.1   |  31  |  1.39<H>    Ca    9.9      04 Mar 2019 11:40    TPro  7.4  /  Alb  3.6  /  TBili  0.7  /  DBili  x   /  AST  36  /  ALT  19  /  AlkPhos  106  03-04    Creatinine Trend: 1.39<--  PT/INR - ( 04 Mar 2019 11:40 )   PT: 11.9 sec;   INR: 1.03 ratio         PTT - ( 04 Mar 2019 11:40 )  PTT:27.5 sec      Venous Blood Gas:  03-04 @ 11:39  7.21/104/36/40/41  VBG Lactate: 2.7      MICROBIOLOGY:     RADIOLOGY:  [ ] Reviewed and interpreted by me    EKG: (04 Mar 2019 17:42)      PAST MEDICAL & SURGICAL HISTORY:  Arthritis  Asthma  Diabetes  Hypertension  Alzheimer's dementia  H/O abdominal surgery      Allergies    No Known Allergies    Intolerances        ANTIMICROBIALS:  azithromycin  IVPB 500 every 24 hours  piperacillin/tazobactam IVPB. 3.375 every 12 hours  vancomycin  IVPB 1000 every 24 hours      OTHER MEDS:  ALBUTerol    90 MICROgram(s) HFA Inhaler 2 Puff(s) Inhalation every 12 hours PRN  ALBUTerol/ipratropium for Nebulization 3 milliLiter(s) Nebulizer every 6 hours  aspirin  chewable 81 milliGRAM(s) Oral daily  dextrose 5% + lactated ringers. 1000 milliLiter(s) IV Continuous <Continuous>  heparin  Injectable 5000 Unit(s) SubCutaneous every 12 hours  polyethylene glycol 3350 17 Gram(s) Oral daily  senna Syrup 10 milliLiter(s) Oral at bedtime      SOCIAL HISTORY:    FAMILY HISTORY:  No pertinent family history in first degree relatives    No pertinent family history in relation to chief complaint     ROS:  Unobtainable because:   All other systems negative   Constitutional: no fever, no chills, no weight loss, no night sweats  HEENT:  no vision changes, no sore throat, no rhinorrhea  Cardiovascular:  no chest pain, no palpitation  Respiratory:  no SOB, no cough  GI:  no abd pain, no vomiting, no diarrhea  urinary: no dysuria, no hematuria, no flank pain  musculoskeletal:  no joint pain, no joint swelling  skin:  no rash  neurology:  no headache, no seizure, no change in mental status  heme: no bleeding    Physical Exam:  General:    NAD, non toxic, A&O x3  HEENT:   no oropharyngeal lesions, no LAD, neck supple  Cardiovascular:    regular rate and rhythm   Respiratory: nonlabored on room air, clear bilaterally, no wheezing  abd:   soft, bowel sounds present, not tender, no hepatosplenomegaly  :     no CVAT, no spurapubic tenderness, no viyera  Musculoskeletal : no joint swelling  Skin:    no rash  Neurologic:     no focal deficits  Vascular: no edema, no phlebitis   Psych: normal affect    Drug Dosing Weight  Height (cm): 154.9 (04 Mar 2019 17:27)  Weight (kg): 47.7 (04 Mar 2019 20:00)  BMI (kg/m2): 19.9 (04 Mar 2019 20:00)  BSA (m2): 1.44 (04 Mar 2019 20:00)    Vital Signs Last 24 Hrs  T(F): 98 (03-07-19 @ 11:02), Max: 99.2 (03-05-19 @ 04:00)    Vital Signs Last 24 Hrs  HR: 62 (03-07-19 @ 11:17) (48 - 85)  BP: 124/59 (03-07-19 @ 11:02) (98/58 - 164/67)  RR: 19 (03-07-19 @ 11:02)  SpO2: 99% (03-07-19 @ 11:17) (89% - 100%)  Wt(kg): --                          10.1   4.4   )-----------( 140      ( 07 Mar 2019 00:34 )             30.2       03-07    143  |  102  |  38<H>  ----------------------------<  201<H>  4.8   |  29  |  0.97    Ca    9.0      07 Mar 2019 00:34  Phos  3.6     03-07  Mg     2.1     03-07    TPro  5.4<L>  /  Alb  2.4<L>  /  TBili  0.6  /  DBili  x   /  AST  35  /  ALT  16  /  AlkPhos  66  03-07          MICROBIOLOGY:  Culture - Blood (03.04.19 @ 16:21)  Gram Stain: Growth in aerobic bottle: Gram Positive Cocci in Pairs and Chains  Culture Results: Growth in aerobic bottle: Streptococcus species  Organism: Blood Culture PCR    -  Coagulase negative Staphylococcus: Detec    -  Enterococcus species: Detec    -  Streptococcus sp. (Not Grp A, B or S pneumoniae): Detec    Vancomycin Level, Trough: 10.3 ug/mL (03-07-19 @ 00:34)    Culture - Blood (03.04.19 @ 16:21)    Specimen Source: .Blood Blood-Peripheral    Culture - Bronchial (03.04.19 @ 22:17)    Gram Stain:   Numerous polymorphonuclear leukocytes per low power field  Rare Squamous Epithelial Cells per low power field  Few Gram Variable Rods per oil power field  Rare Gram positive cocci in pairs per oil power field    Specimen Source: Bronch Wash Combicath    Culture Results:   Normal Respiratory Anna present      Culture Results:   No growth to date.    Culture - Urine (03.04.19 @ 17:39)    Specimen Source: .Urine Catheterized    Culture Results:   No growth              Rapid RVP Result: NotDetec (03-04 @ 12:31)    RADIOLOGY:  VA Duplex Lower Ext Vein Scan, Bilat (03.05.19 @ 15:30)   No evidence of acute DVT in the bilateral lower extremity extremities.  Chronic wall thickening involves the right common femoral and proximal to mid femoral veins.    Xray Chest 1 View- PORTABLE-Urgent (03.04.19 @ 18:27)   1.  Right lung curvilinear and hazy opacity is grossly unchanged from prior study. Probably compatible with the fissure.  2.  Unchanged bilateral pleural effusion.  3.  Lines and tubes as described above.    CT Chest No Cont (03.04.19 @ 14:43)   1.  Left pleural effusion and passive atelectasis of left lower lobe.  2.  Right upper lobe volume loss and bronchiectasis with patchy opacities in the right upper lobe and to a lesser extent the right middle lobe and right lower lobe can be the sequelae of prior scarring/infection.  3.  Small right pleural effusion. HPI:  89F with Alzheimer dementia, Afib, CHF and Asthma presented 3/4/19 for worsening dyspnea and lethargy with productive coughing. Found to be in hypoxic and hypercapnic respiratory failure, was intubated and admitted to MICU, managed for possible CHF and/or pneumonia. s/p thoracentesis for a left effusion 3/6/19, slightly exudative. Extubated later that day and downgraded today from MICU.   ID consulted for blood cultures which grew Enterococcus and positive by PCR also for CoNS and alpha Strep.   She lives at home with her daughter. Another daughter is at bedside and assists with translating but she does not live with her and is not fully aware of all the details that occur at home.   Patient denies pain but has been itching/pressing on her abdomen. Also noted to be moving frequently, unable to get comfortable.   Daughter states that today she is coughing more but before admission she had a stable unchanged cough. No diarrhea. Has not complained of dysuria. Very limited interview due to dementia.     PAST MEDICAL & SURGICAL HISTORY:  Arthritis  Asthma  Diabetes  Hypertension  Alzheimer's dementia  H/O abdominal surgery      Allergies    No Known Allergies    Intolerances    ANTIMICROBIALS:  azithromycin  IVPB 500 every 24 hours  piperacillin/tazobactam IVPB. 3.375 every 12 hours  vancomycin  IVPB 1000 every 24 hours    OTHER MEDS:  ALBUTerol    90 MICROgram(s) HFA Inhaler 2 Puff(s) Inhalation every 12 hours PRN  ALBUTerol/ipratropium for Nebulization 3 milliLiter(s) Nebulizer every 6 hours  aspirin  chewable 81 milliGRAM(s) Oral daily  dextrose 5% + lactated ringers. 1000 milliLiter(s) IV Continuous <Continuous>  heparin  Injectable 5000 Unit(s) SubCutaneous every 12 hours  polyethylene glycol 3350 17 Gram(s) Oral daily  senna Syrup 10 milliLiter(s) Oral at bedtime    SOCIAL HISTORY: Born in [place]. Nonsmoker. Lives with her daughter.     FAMILY HISTORY:  No pertinent family history in first degree relatives in relation to chief complaint     ROS:  Unobtainable because:   Constitutional: no fever, no chills, no weight loss, no night sweats  HEENT:  no vision changes, no sore throat, no rhinorrhea  Cardiovascular:  no chest pain, no palpitation  Respiratory:  no SOB, no cough  GI:  no abd pain, no vomiting, no diarrhea  urinary: no dysuria, no hematuria, no flank pain  musculoskeletal:  no joint pain, no joint swelling  skin:  no rash  neurology:  no headache, no seizure, no change in mental status  heme: no bleeding    Physical Exam:  General:    NAD, non toxic, A&O x3  HEENT:   no oropharyngeal lesions, no LAD, neck supple  Cardiovascular:    regular rate and rhythm   Respiratory: nonlabored on room air, clear bilaterally, no wheezing  abd:   soft, bowel sounds present, not tender, no hepatosplenomegaly  :     no CVAT, no spurapubic tenderness, no vieyra  Musculoskeletal : no joint swelling  Skin:    no rash  Neurologic:     no focal deficits  Vascular: no edema, no phlebitis   Psych: normal affect    Drug Dosing Weight  Height (cm): 154.9 (04 Mar 2019 17:27)  Weight (kg): 47.7 (04 Mar 2019 20:00)  BMI (kg/m2): 19.9 (04 Mar 2019 20:00)  BSA (m2): 1.44 (04 Mar 2019 20:00)    Vital Signs Last 24 Hrs  T(F): 98 (03-07-19 @ 11:02), Max: 99.2 (03-05-19 @ 04:00)    Vital Signs Last 24 Hrs  HR: 62 (03-07-19 @ 11:17) (48 - 85)  BP: 124/59 (03-07-19 @ 11:02) (98/58 - 164/67)  RR: 19 (03-07-19 @ 11:02)  SpO2: 99% (03-07-19 @ 11:17) (89% - 100%)  Wt(kg): --                          10.1   4.4   )-----------( 140      ( 07 Mar 2019 00:34 )             30.2       03-07    143  |  102  |  38<H>  ----------------------------<  201<H>  4.8   |  29  |  0.97    Ca    9.0      07 Mar 2019 00:34  Phos  3.6     03-07  Mg     2.1     03-07    TPro  5.4<L>  /  Alb  2.4<L>  /  TBili  0.6  /  DBili  x   /  AST  35  /  ALT  16  /  AlkPhos  66  03-07          MICROBIOLOGY:  Culture - Blood (03.04.19 @ 16:21)  Gram Stain: Growth in aerobic bottle: Gram Positive Cocci in Pairs and Chains  Culture Results: Growth in aerobic bottle: Streptococcus species  Organism: Blood Culture PCR    -  Coagulase negative Staphylococcus: Detec    -  Enterococcus species: Detec    -  Streptococcus sp. (Not Grp A, B or S pneumoniae): Detec    Vancomycin Level, Trough: 10.3 ug/mL (03-07-19 @ 00:34)    Culture - Blood (03.04.19 @ 16:21)    Specimen Source: .Blood Blood-Peripheral    Culture - Bronchial (03.04.19 @ 22:17)    Gram Stain:   Numerous polymorphonuclear leukocytes per low power field  Rare Squamous Epithelial Cells per low power field  Few Gram Variable Rods per oil power field  Rare Gram positive cocci in pairs per oil power field    Specimen Source: Bronch Wash Combicath    Culture Results:   Normal Respiratory Anna present      Culture Results:   No growth to date.    Culture - Urine (03.04.19 @ 17:39)    Specimen Source: .Urine Catheterized    Culture Results:   No growth              Rapid RVP Result: NotDetec (03-04 @ 12:31)    RADIOLOGY:  VA Duplex Lower Ext Vein Scan, Bilat (03.05.19 @ 15:30)   No evidence of acute DVT in the bilateral lower extremity extremities.  Chronic wall thickening involves the right common femoral and proximal to mid femoral veins.    Xray Chest 1 View- PORTABLE-Urgent (03.04.19 @ 18:27)   1.  Right lung curvilinear and hazy opacity is grossly unchanged from prior study. Probably compatible with the fissure.  2.  Unchanged bilateral pleural effusion.  3.  Lines and tubes as described above.    CT Chest No Cont (03.04.19 @ 14:43)   1.  Left pleural effusion and passive atelectasis of left lower lobe.  2.  Right upper lobe volume loss and bronchiectasis with patchy opacities in the right upper lobe and to a lesser extent the right middle lobe and right lower lobe can be the sequelae of prior scarring/infection.  3.  Small right pleural effusion. HPI:  89F with Alzheimer dementia, Afib, CHF and Asthma presented 3/4/19 for worsening dyspnea and lethargy with productive coughing. Found to be in hypoxic and hypercapnic respiratory failure, was intubated and admitted to MICU, managed for possible CHF and/or pneumonia. s/p thoracentesis for a left effusion 3/6/19, slightly exudative. Extubated later that day and downgraded today from MICU.   ID consulted for blood cultures which grew Enterococcus and positive by PCR also for CoNS and alpha Strep.   She lives at home with her daughter. Another daughter is at bedside and assists with translating but she does not live with her and is not fully aware of all the details that occur at home.   Patient denies pain but has been itching/pressing on her abdomen. Also noted to be moving frequently, unable to get comfortable.   Daughter states that today she is coughing more but before admission she had a stable unchanged cough. No diarrhea. Has not complained of dysuria. Very limited interview due to dementia.     PAST MEDICAL & SURGICAL HISTORY:  Arthritis  Asthma  Diabetes  Hypertension  Alzheimer's dementia  H/O abdominal surgery      Allergies    No Known Allergies    Intolerances    ANTIMICROBIALS:  azithromycin  IVPB 500 every 24 hours  piperacillin/tazobactam IVPB. 3.375 every 12 hours  vancomycin  IVPB 1000 every 24 hours    OTHER MEDS:  ALBUTerol    90 MICROgram(s) HFA Inhaler 2 Puff(s) Inhalation every 12 hours PRN  ALBUTerol/ipratropium for Nebulization 3 milliLiter(s) Nebulizer every 6 hours  aspirin  chewable 81 milliGRAM(s) Oral daily  dextrose 5% + lactated ringers. 1000 milliLiter(s) IV Continuous <Continuous>  heparin  Injectable 5000 Unit(s) SubCutaneous every 12 hours  polyethylene glycol 3350 17 Gram(s) Oral daily  senna Syrup 10 milliLiter(s) Oral at bedtime    SOCIAL HISTORY: Born in [place]. Nonsmoker. Lives with her daughter.     FAMILY HISTORY:  No pertinent family history in first degree relatives in relation to chief complaint     ROS:  Unobtainable because: dementia   Respiratory:  cough  GI:  no abd pain, no diarrhea  musculoskeletal:  no pain   skin:  itchy     Physical Exam:  General:  elderly, frail, nontoxic, awake alert   HEENT: no gross oropharyngeal lesions, no LAD, neck supple  Cardiovascular:    faint heart sounds   Respiratory: nonlabored on room air, diffuse coarse rhonchi bilaterally   abd: soft, bowel sounds present, not tender, no hepatosplenomegaly  : no spurapubic tenderness, no vieyra  Musculoskeletal: no joint swelling. feet look fine   Skin: no rash  Neurologic: no focal deficits  Vascular: no edema, no phlebitis   Psych: normal affect    Drug Dosing Weight  Height (cm): 154.9 (04 Mar 2019 17:27)  Weight (kg): 47.7 (04 Mar 2019 20:00)  BMI (kg/m2): 19.9 (04 Mar 2019 20:00)  BSA (m2): 1.44 (04 Mar 2019 20:00)    Vital Signs Last 24 Hrs  T(F): 98 (03-07-19 @ 11:02), Max: 99.2 (03-05-19 @ 04:00)    Vital Signs Last 24 Hrs  HR: 62 (03-07-19 @ 11:17) (48 - 85)  BP: 124/59 (03-07-19 @ 11:02) (98/58 - 164/67)  RR: 19 (03-07-19 @ 11:02)  SpO2: 99% (03-07-19 @ 11:17) (89% - 100%)  Wt(kg): --                          10.1   4.4   )-----------( 140      ( 07 Mar 2019 00:34 )             30.2       03-07    143  |  102  |  38<H>  ----------------------------<  201<H>  4.8   |  29  |  0.97    Ca    9.0      07 Mar 2019 00:34  Phos  3.6     03-07  Mg     2.1     03-07    TPro  5.4<L>  /  Alb  2.4<L>  /  TBili  0.6  /  DBili  x   /  AST  35  /  ALT  16  /  AlkPhos  66  03-07          MICROBIOLOGY:  Vancomycin Level, Trough: 10.3 ug/mL (03-07-19 @ 00:34)    Culture - Blood (03.04.19 @ 16:21)  Gram Stain: Growth in aerobic bottle: Gram Positive Cocci in Pairs and Chains  Culture Results: Growth in aerobic bottle: Streptococcus species  Organism: Blood Culture PCR    -  Coagulase negative Staphylococcus: Detec    -  Enterococcus species: Detec    -  Streptococcus sp. (Not Grp A, B or S pneumoniae): Detec    Culture - Blood (03.04.19 @ 16:21)    Specimen Source: .Blood Blood-Peripheral    Culture - Bronchial (03.04.19 @ 22:17)    Gram Stain:   Numerous polymorphonuclear leukocytes per low power field  Rare Squamous Epithelial Cells per low power field  Few Gram Variable Rods per oil power field  Rare Gram positive cocci in pairs per oil power field    Specimen Source: Bronch Wash Combicath    Culture Results:   Normal Respiratory Anna present    Culture - Urine (03.04.19 @ 17:39)    Specimen Source: .Urine Catheterized    Culture Results: No growth    Rapid RVP Result: NotDetec (03-04 @ 12:31)    Culture - Fungal, Body Fluid (03.06.19 @ 05:14)    Specimen Source: .Body Fluid Pleural Fluid    Culture Results: Testing in progress    Culture - Body Fluid with Gram Stain (03.06.19 @ 05:14)    Gram Stain: No polymorphonuclear cells seen. No organisms seen by cytocentrifuge    Specimen Source: .Body Fluid Pleural Fluid    Culture Results: No growth    Culture - Acid Fast - Body Fluid w/Smear (03.06.19 @ 05:14)    Specimen Source: .Body Fluid Pleural Fluid    Acid Fast Bacilli Smear:   No acid fast bacilli seen by fluorochrome stain    Culture - Blood (03.06.19 @ 02:50)    Specimen Source: .Blood Blood    Culture Results:   No growth to date.    Culture - Blood (03.06.19 @ 02:50)    Specimen Source: .Blood Blood    Culture Results:   No growth to date.    RADIOLOGY:  VA Duplex Lower Ext Vein Scan, Bilat (03.05.19 @ 15:30)   No evidence of acute DVT in the bilateral lower extremity extremities.  Chronic wall thickening involves the right common femoral and proximal to mid femoral veins.    Xray Chest 1 View- PORTABLE-Urgent (03.04.19 @ 18:27)   1.  Right lung curvilinear and hazy opacity is grossly unchanged from prior study. Probably compatible with the fissure.  2.  Unchanged bilateral pleural effusion.  3.  Lines and tubes as described above.    CT Chest No Cont (03.04.19 @ 14:43)   1.  Left pleural effusion and passive atelectasis of left lower lobe.  2.  Right upper lobe volume loss and bronchiectasis with patchy opacities in the right upper lobe and to a lesser extent the right middle lobe and right lower lobe can be the sequelae of prior scarring/infection.  3.  Small right pleural effusion.

## 2019-03-07 NOTE — PROGRESS NOTE ADULT - PROBLEM SELECTOR PLAN 1
Presented with progressive SOB and lethargy   VBG 7.21 CO2- 102  s/p extubation in MICU and thoracentesis.  Etiology of respiratory failure likely multifactorial including infection vs chf vs pleural effusion?  Now improved on nasal cannula.

## 2019-03-07 NOTE — PROGRESS NOTE ADULT - SUBJECTIVE AND OBJECTIVE BOX
CC:  hypercapnic resp fx     Hospital course:  89 yr old female with PMHx of alzhiemer, HTN, Afib, THN, CHF (last documented EF from 2015 of 62%), Asthma, cholecystectomy who presents to ED this morning from home after developing progressive SOB with lethargy over 3 to 4 days. Found to be hypercapneic.  Intubated for respiratory failure.  ETiology appears to be from large pleural effusion s/p thoracentesis and extubated to medical floor    NKDA      PMH:  Diabetes, HTN, Alzheimers dementia,     PSH:  H/O abdominal surgery.    Social History:  ·  Marital Status		  ·  Lives With	children	  ·  Substance Use	never used    Patient is a 89y old  Female who presents with a chief complaint of hypercapnic resp failure (07 Mar 2019 12:40)   INTERVAL HPI/OVERNIGHT EVENTS:  Extubated yesterday     Review of Systems: UNABLE TO OBTAIN DUE TO CURRENT MEDICAL CONDITION otherwise negative ( - )fevers/chills ( - ) dyspnea ( - ) cough ( - ) chest pain ( - ) palpatations ( - ) dizziness/lightheadedness ( - ) nausea/vomiting ( - ) abd pain ( - ) diarrhea ( - ) melena ( - ) hematochezia ( - ) dysuria  ( - ) hematuria ( - ) leg swelling  ( -) calf tenderness ( - ) motor weakness ( - ) extremity numbness ( - ) back pain ( + ) tolerating POs ( + ) BM  MEDICATIONS  (STANDING): ALBUTerol/ipratropium for Nebulization 3 milliLiter(s) Nebulizer every 6 hours aspirin  chewable 81 milliGRAM(s) Oral daily azithromycin  IVPB 500 milliGRAM(s) IV Intermittent every 24 hours dextrose 5% + lactated ringers. 1000 milliLiter(s) (30 mL/Hr) IV Continuous <Continuous> heparin  Injectable 5000 Unit(s) SubCutaneous every 12 hours piperacillin/tazobactam IVPB. 3.375 Gram(s) IV Intermittent every 12 hours polyethylene glycol 3350 17 Gram(s) Oral daily senna Syrup 10 milliLiter(s) Oral at bedtime vancomycin  IVPB 1000 milliGRAM(s) IV Intermittent every 24 hours  MEDICATIONS  (PRN): ALBUTerol    90 MICROgram(s) HFA Inhaler 2 Puff(s) Inhalation every 12 hours PRN asthma   Allergies  No Known Allergies  Intolerances     Vital Signs Last 24 Hrs T(C): 36.7 (07 Mar 2019 11:02), Max: 37.1 (06 Mar 2019 17:00) T(F): 98 (07 Mar 2019 11:02), Max: 98.8 (06 Mar 2019 17:00) HR: 62 (07 Mar 2019 11:17) (48 - 85) BP: 124/59 (07 Mar 2019 11:02) (98/58 - 164/67) BP(mean): 81 (07 Mar 2019 10:00) (72 - 101) RR: 19 (07 Mar 2019 11:02) (11 - 30) SpO2: 99% (07 Mar 2019 11:17) (89% - 100%) CAPILLARY BLOOD GLUCOSE   POCT Blood Glucose.: 114 mg/dL (07 Mar 2019 12:46) POCT Blood Glucose.: 143 mg/dL (07 Mar 2019 10:06) POCT Blood Glucose.: 64 mg/dL (07 Mar 2019 09:47) POCT Blood Glucose.: 172 mg/dL (07 Mar 2019 05:12)   03-06 @ 07:01  -  03-07 @ 07:00 -------------------------------------------------------- IN: 794.2 mL / OUT: 830 mL / NET: -35.8 mL  03-07 @ 07:01  -  03-07 @ 12:51 -------------------------------------------------------- IN: 80 mL / OUT: 45 mL / NET: 35 mL    Physical Exam:   Daily    General: CACHETIC ILL APPEARING HEENT:  DRY MMM  CV:  Irregular rhythm  no murmur Lungs:  rhonchi anteriorly  Abdomen:  Soft, non-tender, Extremities:  no edema Skin:  Warm and dry, no rashes :  No vieyra Neuro:  AAOx0, non focal  No Restraints  LABS:                      10.1  4.4   )-----------( 140      ( 07 Mar 2019 00:34 )            30.2   03-07  143  |  102  |  38<H> ----------------------------<  201<H> 4.8   |  29  |  0.97  Ca    9.0      07 Mar 2019 00:34 Phos  3.6     03-07 Mg     2.1     03-07  TPro  5.4<L>  /  Alb  2.4<L>  /  TBili  0.6  /  DBili  x   /  AST  35  /  ALT  16  /  AlkPhos  66  03-07  PT/INR - ( 07 Mar 2019 00:34 )   PT: 13.0 sec;   INR: 1.13 ratio      PTT - ( 07 Mar 2019 00:34 )  PTT:36.2 sec    RADIOLOGY & ADDITIONAL TESTS:  ---------------------------------------------------------------------------

## 2019-03-07 NOTE — PROGRESS NOTE ADULT - PROBLEM SELECTOR PLAN 7
Found to have cougulase negative staph, enterococcus, strep species   Zosyn, Vanco and Azith  Repeat cultures cleared  Need official ID recs

## 2019-03-07 NOTE — PROGRESS NOTE ADULT - ATTENDING COMMENTS
Obstructive lung dz sec to severe bronchiectasis presented with resp failure sec to large left effusion unclear cause? CHF related?     1. resp failure - improved with thorac (transudate likely- ? sec t CHF? awaiting cytology results)- diurese and extubated today to bipap  2. bronchiectasis with evidence of chronic obstruction and CO2 retention - nebs -   3. Cytology pending of left effusion    cc time 35 mns

## 2019-03-07 NOTE — PROGRESS NOTE ADULT - SUBJECTIVE AND OBJECTIVE BOX
CHIEF COMPLAINT:    Interval Events:    REVIEW OF SYSTEMS:  Constitutional: [ ] fevers [ ] chills [ ] weight loss [ ] weight gain  HEENT: [ ] dry eyes [ ] eye irritation [ ] postnasal drip [ ] nasal congestion  CV: [ ] chest pain [ ] orthopnea [ ] palpitations [ ] murmur  Resp: [ ] cough [ ] shortness of breath [ ] dyspnea [ ] wheezing [ ] sputum [ ] hemoptysis  GI: [ ] nausea [ ] vomiting [ ] diarrhea [ ] constipation [ ] abd pain [ ] dysphagia   : [ ] dysuria [ ] nocturia [ ] hematuria [ ] increased urinary frequency  Musculoskeletal: [ ] back pain [ ] myalgias [ ] arthralgias [ ] fracture  Skin: [ ] rash [ ] itch  Neurological: [ ] headache [ ] dizziness [ ] syncope [ ] weakness [ ] numbness  Psychiatric: [ ] anxiety [ ] depression  Endocrine: [ ] diabetes [ ] thyroid problem  Hematologic/Lymphatic: [ ] anemia [ ] bleeding problem  Allergic/Immunologic: [ ] itchy eyes [ ] nasal discharge [ ] hives [ ] angioedema  [ ] All other systems negative  [ ] Unable to assess ROS because ________    OBJECTIVE:  ICU Vital Signs Last 24 Hrs  T(C): 36.1 (07 Mar 2019 08:00), Max: 37.2 (06 Mar 2019 09:00)  T(F): 97 (07 Mar 2019 08:00), Max: 99 (06 Mar 2019 09:00)  HR: 54 (07 Mar 2019 07:00) (48 - 94)  BP: 127/67 (07 Mar 2019 07:00) (103/52 - 164/67)  BP(mean): 87 (07 Mar 2019 07:00) (75 - 104)  ABP: --  ABP(mean): --  RR: 12 (07 Mar 2019 07:00) (11 - 36)  SpO2: 100% (07 Mar 2019 07:00) (89% - 100%)    Mode: NIV (Noninvasive Ventilation), RR (machine): 14, FiO2: 40, PEEP: 5, ITime: 0.9, MAP: 6, PC: 5  I & O's    03-06 @ 07:01  -  03-07 @ 07:00  --------------------------------------------------------  IN: 794.2 mL / OUT: 830 mL / NET: -35.8 mL    03-07 @ 07:01  -  03-07 @ 08:06  --------------------------------------------------------  IN: 25 mL / OUT: 25 mL / NET: 0 mL      CAPILLARY BLOOD GLUCOSE      POCT Blood Glucose.: 172 mg/dL (07 Mar 2019 05:12)      PHYSICAL EXAM:  General:   HEENT:   Lymph Nodes:  Neck:   Respiratory:   Cardiovascular:   Abdomen:   Extremities:   Skin:   Neurological:  Psychiatry:    LINES:    HOSPITAL MEDICATIONS:  MEDICATIONS  (STANDING):  ALBUTerol/ipratropium for Nebulization 3 milliLiter(s) Nebulizer every 6 hours  aspirin  chewable 81 milliGRAM(s) Oral daily  azithromycin  IVPB 500 milliGRAM(s) IV Intermittent every 24 hours  dexmedetomidine Infusion 0.2 MICROgram(s)/kG/Hr (2.385 mL/Hr) IV Continuous <Continuous>  heparin  Injectable 5000 Unit(s) SubCutaneous every 12 hours  insulin lispro (HumaLOG) corrective regimen sliding scale   SubCutaneous every 6 hours  lactated ringers. 1000 milliLiter(s) (100 mL/Hr) IV Continuous <Continuous>  piperacillin/tazobactam IVPB. 3.375 Gram(s) IV Intermittent every 12 hours  polyethylene glycol 3350 17 Gram(s) Oral daily  senna Syrup 10 milliLiter(s) Oral at bedtime  vancomycin  IVPB 1000 milliGRAM(s) IV Intermittent every 24 hours    MEDICATIONS  (PRN):  ALBUTerol    90 MICROgram(s) HFA Inhaler 2 Puff(s) Inhalation every 12 hours PRN asthma  fentaNYL    Injectable 12.5 MICROGram(s) IV Push every 4 hours PRN agitation/pain      LABS:                        10.1   4.4   )-----------( 140      ( 07 Mar 2019 00:34 )             30.2     Hgb Trend: 10.1<--, 11.6<--, 11.2<--, 10.1<--, 11.9<--  03-07    143  |  102  |  38<H>  ----------------------------<  201<H>  4.8   |  29  |  0.97    Ca    9.0      07 Mar 2019 00:34  Phos  3.6     03-07  Mg     2.1     03-07    TPro  5.4<L>  /  Alb  2.4<L>  /  TBili  0.6  /  DBili  x   /  AST  35  /  ALT  16  /  AlkPhos  66  03-07    Creatinine Trend: 0.97<--, 1.29<--, 1.45<--, 1.52<--, 1.39<--  PT/INR - ( 07 Mar 2019 00:34 )   PT: 13.0 sec;   INR: 1.13 ratio         PTT - ( 07 Mar 2019 00:34 )  PTT:36.2 sec    Arterial Blood Gas:  03-07 @ 00:27  7.38/61/160/36/99/9.2  ABG lactate: --  Arterial Blood Gas:  03-06 @ 19:58  7.37/64/146/36/99/9.3  ABG lactate: --  Arterial Blood Gas:  03-06 @ 15:13  7.39/63/84/37/96/10.4  ABG lactate: --  Arterial Blood Gas:  03-06 @ 00:39  7.41/58/114/36/98/10.2  ABG lactate: --        MICROBIOLOGY:     RADIOLOGY:  [ ] Reviewed and interpreted by me    EKG: CHIEF COMPLAINT: Hypercapnic Respiratory Failure s/p intubation s/p chf exacerbation vs PNA w noted Left sided pleural effusion s/p Thoracentesis 3/6/19    Interval Events: S/P Extubation yesterday, off of BIPAP since 0430 this am and tolerating 3L NC. Haldol 2.5mg x 1 and fentanyl 12.5 x 1 administered last night for agitation. Off of precedex and remains calm s/p being taken off of BIPAP     REVIEW OF SYSTEMS:  [ x] Unable to assess ROS because _becomes agitated when awake/ not following direction _______      REVIEW OF SYSTEMS:  No complaints    OBJECTIVE:  ICU Vital Signs Last 24 Hrs  T(C): 36.1 (07 Mar 2019 08:00), Max: 37.2 (06 Mar 2019 09:00)  T(F): 97 (07 Mar 2019 08:00), Max: 99 (06 Mar 2019 09:00)  HR: 54 (07 Mar 2019 07:00) (48 - 94)  BP: 127/67 (07 Mar 2019 07:00) (103/52 - 164/67)  BP(mean): 87 (07 Mar 2019 07:00) (75 - 104)  ABP: --  ABP(mean): --  RR: 12 (07 Mar 2019 07:00) (11 - 36)  SpO2: 100% (07 Mar 2019 07:00) (89% - 100%)      I & O's    03-06 @ 07:01  -  03-07 @ 07:00  --------------------------------------------------------  IN: 794.2 mL / OUT: 830 mL / NET: -35.8 mL    03-07 @ 07:01  -  03-07 @ 08:06  --------------------------------------------------------  IN: 25 mL / OUT: 25 mL / NET: 0 mL      CAPILLARY BLOOD GLUCOSE      POCT Blood Glucose.: 172 mg/dL (07 Mar 2019 05:12)        PHYSICAL EXAM:  General: appears comfortable off of precedex  HEENT: normocephalic, atraumatic, trachea midline, oral mucosa pink / moist   Lymph Nodes: non palp  Neck: supple, neg JVD  Respiratory: Bilaterally equal BS & chest excursion, coarse rhonchi bilaterally auscultated R>L , L pleural effusion on FOCUS  Cardiovascular: S1S2 Irregular, Afib, small pericardial effusion noted on BS Focus  Abdomen: non distended, +BS x 4  Extremities: negative edema  Skin: warm / dry  Neurological: +PERRL, negative nystagmus, +facial symmetry, RUSSELL spontaneously.       LINES: [facility] MEDICATIONS:  MEDICATIONS  (STANDING):  ALBUTerol/ipratropium for Nebulization 3 milliLiter(s) Nebulizer every 6 hours  aspirin  chewable 81 milliGRAM(s) Oral daily  azithromycin  IVPB 500 milliGRAM(s) IV Intermittent every 24 hours  dexmedetomidine Infusion 0.2 MICROgram(s)/kG/Hr (2.385 mL/Hr) IV Continuous <Continuous>  heparin  Injectable 5000 Unit(s) SubCutaneous every 12 hours  insulin lispro (HumaLOG) corrective regimen sliding scale   SubCutaneous every 6 hours  lactated ringers. 1000 milliLiter(s) (100 mL/Hr) IV Continuous <Continuous>  piperacillin/tazobactam IVPB. 3.375 Gram(s) IV Intermittent every 12 hours  polyethylene glycol 3350 17 Gram(s) Oral daily  senna Syrup 10 milliLiter(s) Oral at bedtime  vancomycin  IVPB 1000 milliGRAM(s) IV Intermittent every 24 hours    MEDICATIONS  (PRN):  ALBUTerol    90 MICROgram(s) HFA Inhaler 2 Puff(s) Inhalation every 12 hours PRN asthma  fentaNYL    Injectable 12.5 MICROGram(s) IV Push every 4 hours PRN agitation/pain      LABS:                        10.1   4.4   )-----------( 140      ( 07 Mar 2019 00:34 )             30.2     Hgb Trend: 10.1<--, 11.6<--, 11.2<--, 10.1<--, 11.9<--  03-07    143  |  102  |  38<H>  ----------------------------<  201<H>  4.8   |  29  |  0.97    Ca    9.0      07 Mar 2019 00:34  Phos  3.6     03-07  Mg     2.1     03-07    TPro  5.4<L>  /  Alb  2.4<L>  /  TBili  0.6  /  DBili  x   /  AST  35  /  ALT  16  /  AlkPhos  66  03-07    Creatinine Trend: 0.97<--, 1.29<--, 1.45<--, 1.52<--, 1.39<--  PT/INR - ( 07 Mar 2019 00:34 )   PT: 13.0 sec;   INR: 1.13 ratio         PTT - ( 07 Mar 2019 00:34 )  PTT:36.2 sec    Arterial Blood Gas:  03-07 @ 00:27  7.38/61/160/36/99/9.2  ABG lactate: --  Arterial Blood Gas:  03-06 @ 19:58  7.37/64/146/36/99/9.3  ABG lactate: --  Arterial Blood Gas:  03-06 @ 15:13  7.39/63/84/37/96/10.4  ABG lactate: --  Arterial Blood Gas:  03-06 @ 00:39  7.41/58/114/36/98/10.2  ABG lactate: --        MICROBIOLOGY:   Culture - Fungal, Body Fluid (03.06.19 @ 05:14)    Specimen Source: .Body Fluid Pleural Fluid    Culture Results:   Testing in progress    Culture - Body Fluid with Gram Stain (03.06.19 @ 05:14)    Gram Stain:   No polymorphonuclear cells seen  No organisms seen  by cytocentrifuge    Specimen Source: .Body Fluid Pleural Fluid    Culture Results:   No growth    Culture - Acid Fast - Body Fluid w/Smear (03.06.19 @ 05:14)    Specimen Source: .Body Fluid Pleural Fluid    Acid Fast Bacilli Smear:   No acid fast bacilli seen by fluorochrome stain    Culture - Acid Fast - Body Fluid w/Smear (03.06.19 @ 05:14)    Specimen Source: .Body Fluid Pleural Fluid    Acid Fast Bacilli Smear:   No acid fast bacilli seen by fluorochrome stain    Culture - Blood (03.06.19 @ 02:50)    Specimen Source: .Blood Blood    Culture Results:   No growth to date.    Culture - Blood (03.06.19 @ 02:50)    Specimen Source: .Blood Blood    Culture Results:   No growth to date.    Culture - Bronchial (03.04.19 @ 22:17)    Gram Stain:   Numerous polymorphonuclear leukocytes per low power field  Rare Squamous Epithelial Cells per low power field  Few Gram Variable Rods per oil power field  Rare Gram positive cocci in pairs per oil power field    Specimen Source: Bronch Wash Combicath    Culture Results:   Normal Respiratory Anna present      RADIOLOGY:  < from: CT Chest No Cont (03.04.19 @ 14:43) >    EXAM:  CT CHEST                            PROCEDURE DATE:  03/04/2019            INTERPRETATION:  CLINICAL INFORMATION: Respiratory distress.    COMPARISON: CT abdomen pelvis from 9/27/2015.    PROCEDURE:   CT of the Chest was performed without intravenous contrast.  Sagittal and coronal reformats were performed.      FINDINGS:    CHEST:     LUNGS AND LARGE AIRWAYS: Patchy groundglass opacities of left upper lobe.   Left lower lobe compressive atelectasis.    Right upper lobe apical scarring and and bronchiectasis with subsegmental   atelectasis. Right middle lobe subsegmental atelectasis.    In the right lower lobe is mild bronchiectasis and patchy groundglass and   dense opacities. There are secretions within the right lower lobe   bronchus and the segmental bronchi, best shown in the superior segment of   the right lower lobe.    Calcified nodules in the right middle lobe and right upper lobe.    PLEURA: Moderate left and small right pleural effusions. The pleura is   thickened along the right hemithorax, finding best shown in the right   upper lobe. No pneumothorax.    VESSELS: Calcification of coronary arteries and thoracic aorta. The aorta   is tortuous and ectatic. The main pulmonary artery is enlarged which can   occur in the setting of pulmonary hypertension.    HEART: Cardiomegaly. No pericardial effusion. Annual calcification of   mitral and aortic valve.    MEDIASTINUM AND PABLO: No lymphadenopathy.    CHEST WALL AND LOWER NECK: Cachectic body habitus.    VISUALIZED UPPER ABDOMEN: Pneumobilia.    BONES: Degenerative changes.    IMPRESSION:     1.  Left pleural effusion and passive atelectasis of left lower lobe.  2.  Right upper lobe volume loss and bronchiectasis with patchy opacities   in the right upper lobe and to a lesser extent the right middle lobe and   right lower lobe can be the sequelae of prior scarring/infection.  3.  Small right pleural effusion.        < from: CT Abdomen and Pelvis w/Cont (09.27.15 @ 04:09) >    EXAM:  CT ABD AND PELV W CON                            PROCEDURE DATE:  Sep 27 2015       INTERPRETATION:  CLINICAL INFORMATION: Fever and altered mental status.   Suprapubic pain. Evaluate for colitis or abscess.    COMPARISON: None available.    PROCEDURE:   CT of the Abdomen and Pelvis was performed with intravenous contrast.   Intravenous contrast: 90 ml Omnipaque 350. 10 ml discarded.  Oral contrast: positive contrast was administered.  Sagittal and coronal reformats were performed.    FINDINGS:    LOWER CHEST: Trace left pleural effusion and subsegmental atelectasis in   the left lung base.  Right heart enlargement.  Small amount of   pericardial fluid posteriorly-inferiorly.  Mild coronary artery   atherosclerosis and mild calcification of the aortic valve leaflets.    Thickening of the distal esophagus versus small hiatal hernia.    LIVER: Within normal limits.  BILE DUCTS: Extensive pneumobilia.  Intrahepatic and extrahepatic biliary   ductal dilatation.  Common bile duct dilated to 1 cm with possible small   stones distally.  Thickening of the wall the common bile duct.  GALLBLADDER: Not identified, likely surgically absent.  SPLEEN: Few subcentimeter hypodensities which are too small to   characterize.  PANCREAS: Moderate fatty atrophy.  Multiple small hypoattenuating   pancreatic lesions measuring up to 1 cm in the tail, likely side branch   prominence.  No dilatation of the main pancreatic duct.  ADRENALS: Within normal limits.  KIDNEYS/URETERS: The kidneys enhancesymmetrically. There is a 1.0 cm   right renal cyst, as well as bilateral subcentimeter hypodensities which   are too small characterize.    BLADDER: Within normal limits.  REPRODUCTIVE ORGANS: 1.7 cm simple cyst in the right adnexa (2:79).    Approximately 2 cm nodular focus lower vaginal region on the left side,   probably a complex Bartholin's gland cyst.    BOWEL/MESENTERY/PERITONEUM: Thickening versus underdistention of the   distal stomach.  No bowel obstruction.  Normal appendix.  Small amount of   free fluid in the pelvis.  No nonspecific perirectal and presacral edema.    VESSELS:  Diffuse atherosclerosis of the aortoiliac tree with severe   calcification of the splenic artery.  RETROPERITONEUM: Cystic lesion in the right iliac this muscle measuring   approximately 1.9 x 1.6 x 5.4 cm (602 B:35 and 601 B:31).  No   lymphadenopathy.      ABDOMINAL WALL/SOFT TISSUES: Mild subcutaneous edema.  Nonspecific   calcifications in the gluteal regions which may be sequela of prior   trauma or subcutaneous injections.  BONES: Multilevel degenerative changes in the spine with moderate to   severe spinal canal stenosis at L4-L5.    IMPRESSION: Dilated intrahepatic and extrahepatic bile ducts.  Common   bile duct dilated to 1 cm with possible small stones distally.    Thickening of the wall the common bile duct raising the possibility of   superimposed cholangitis.  Correlate with liver function tests.  MRI/MRCP   can be performed for further characterization.    Cystic lesion in the right iliac's muscle measuring approximately 1.9 x   1.6 x 5.4 cm.  Differential diagnosis includes abscess, subacute to   chronic hematoma and neoplasm.    Underdistention versus wall thickening of the distal stomach.    Gastritis/peptic ulcer disease should be excluded clinically.  Upper GI   series can be performed as clinically warranted.    Small cystic lesions in the pancreas measuring up to 1 cm, likely side   branch IPMN's.  No dilatation of the main pancreatic duct.    Approximately 2 cm nodular focus in the lower vagina and the left side,   likely a complex Bartholin's gland cyst.  Correlate with physical exam   findings.            EKG:

## 2019-03-07 NOTE — CHART NOTE - NSCHARTNOTEFT_GEN_A_CORE
MICU Transfer Note    Transfer from: MICU    Transfer to: ( x ) Medicine    (  ) Telemetry     (   ) RCU        (    ) Palliative         (   ) Stroke Unit          (   ) __________________  2 daam 221D    Accepting Physician: Dr. Cristi RUDOLPH COURSE:  89 yr old female with PMHx of Alzheimer's, HTN, Afib ( not on AC), THN, CHF (last documented EF from 2015 of 62%), Asthma, cholecystectomy who presents to ED 3/4 from home with c/o progressively worsening SOB / lethargy over a 3-4 day period and LE edema for which she was treated with spironolactone last week. The pt was found was found to be in hypercapnic respiratory failure, was intubated, found to have a Left sided pleural effusion, is s/p a thoracentesis 3/6 (withdrew 750cc) of most likely transudative fluid 2/2 CHF exacerbation vs / and PNA. The pt was successfully extubated yesterday to a BIPAP and transitioned to a NC 3L. It appears that the pt has bronchiectasis, is a chronic CO2 retainer and would suggest BIPAP nocturnal and PRN.                    ASSESSMENT & PLAN:             For Followup:          Vital Signs Last 24 Hrs  T(C): 36.1 (07 Mar 2019 08:00), Max: 37.2 (06 Mar 2019 12:00)  T(F): 97 (07 Mar 2019 08:00), Max: 99 (06 Mar 2019 12:00)  HR: 85 (07 Mar 2019 08:39) (48 - 85)  BP: 98/58 (07 Mar 2019 08:00) (98/58 - 164/67)  BP(mean): 72 (07 Mar 2019 08:00) (72 - 104)  RR: 12 (07 Mar 2019 08:00) (11 - 36)  SpO2: 100% (07 Mar 2019 08:39) (89% - 100%)  I&O's Summary    06 Mar 2019 07:01  -  07 Mar 2019 07:00  --------------------------------------------------------  IN: 794.2 mL / OUT: 830 mL / NET: -35.8 mL    07 Mar 2019 07:01  -  07 Mar 2019 09:32  --------------------------------------------------------  IN: 50 mL / OUT: 45 mL / NET: 5 mL        MEDICATIONS  (STANDING):  ALBUTerol/ipratropium for Nebulization 3 milliLiter(s) Nebulizer every 6 hours  aspirin  chewable 81 milliGRAM(s) Oral daily  azithromycin  IVPB 500 milliGRAM(s) IV Intermittent every 24 hours  heparin  Injectable 5000 Unit(s) SubCutaneous every 12 hours  insulin lispro (HumaLOG) corrective regimen sliding scale   SubCutaneous every 6 hours  piperacillin/tazobactam IVPB. 3.375 Gram(s) IV Intermittent every 12 hours  polyethylene glycol 3350 17 Gram(s) Oral daily  senna Syrup 10 milliLiter(s) Oral at bedtime  vancomycin  IVPB 1000 milliGRAM(s) IV Intermittent every 24 hours    MEDICATIONS  (PRN):  ALBUTerol    90 MICROgram(s) HFA Inhaler 2 Puff(s) Inhalation every 12 hours PRN asthma        LABS                                            10.1                  Neurophils% (auto):   80.5   (03-07 @ 00:34):    4.4  )-----------(140          Lymphocytes% (auto):  10.4                                          30.2                   Eosinphils% (auto):   0.8      Manual%: Neutrophils x    ; Lymphocytes x    ; Eosinophils x    ; Bands%: x    ; Blasts x                                    143    |  102    |  38                  Calcium: 9.0   / iCa: x      (03-07 @ 00:34)    ----------------------------<  201       Magnesium: 2.1                              4.8     |  29     |  0.97             Phosphorous: 3.6      TPro  5.4    /  Alb  2.4    /  TBili  0.6    /  DBili  x      /  AST  35     /  ALT  16     /  AlkPhos  66     07 Mar 2019 00:34    ( 03-07 @ 00:34 )   PT: 13.0 sec;   INR: 1.13 ratio  aPTT: 36.2 sec MICU Transfer Note    Transfer from: MICU    Transfer to: ( x ) Medicine    (  ) Telemetry     (   ) RCU        (    ) Palliative         (   ) Stroke Unit          (   ) __________________  2 adam 221D    Accepting Physician: Dr. Cristi RUDOLPH COURSE:  89 yr old female with PMHx of Alzheimer's, HTN, Afib ( not on AC), THN, CHF (last documented EF from 2015 of 62%), Asthma, cholecystectomy who presents to ED 3/4 from home with c/o progressively worsening SOB / lethargy over a 3-4 day period and LE edema for which she was treated with spironolactone last week. The pt was found was found to be in hypercapnic respiratory failure, was intubated, found to have a Left sided pleural effusion, is s/p a thoracentesis 3/6 (withdrew 750cc) of most likely transudative fluid 2/2 CHF exacerbation vs / and PNA. The pt was successfully extubated yesterday to a BIPAP and transitioned to a NC 3L. It appears that the pt has bronchiectasis, is a chronic CO2 retainer and would suggest BIPAP nocturnal and PRN.          ASSESSMENT & PLAN:   89yo frail female with a Hx of Alzheimer's, CHF, Afib, asthma, bronchiectasis who was admitted with hypercapnia respiratory failure with a noted Left sided pleural effusion s/p thoracentesis. The pt c/w treatment for PNA.    Neuro:    -c/w neuro checks as per protocol  -fall precautions    CV:            For Followup:          Vital Signs Last 24 Hrs  T(C): 36.1 (07 Mar 2019 08:00), Max: 37.2 (06 Mar 2019 12:00)  T(F): 97 (07 Mar 2019 08:00), Max: 99 (06 Mar 2019 12:00)  HR: 85 (07 Mar 2019 08:39) (48 - 85)  BP: 98/58 (07 Mar 2019 08:00) (98/58 - 164/67)  BP(mean): 72 (07 Mar 2019 08:00) (72 - 104)  RR: 12 (07 Mar 2019 08:00) (11 - 36)  SpO2: 100% (07 Mar 2019 08:39) (89% - 100%)  I&O's Summary    06 Mar 2019 07:01  -  07 Mar 2019 07:00  --------------------------------------------------------  IN: 794.2 mL / OUT: 830 mL / NET: -35.8 mL    07 Mar 2019 07:01  -  07 Mar 2019 09:32  --------------------------------------------------------  IN: 50 mL / OUT: 45 mL / NET: 5 mL        MEDICATIONS  (STANDING):  ALBUTerol/ipratropium for Nebulization 3 milliLiter(s) Nebulizer every 6 hours  aspirin  chewable 81 milliGRAM(s) Oral daily  azithromycin  IVPB 500 milliGRAM(s) IV Intermittent every 24 hours  heparin  Injectable 5000 Unit(s) SubCutaneous every 12 hours  insulin lispro (HumaLOG) corrective regimen sliding scale   SubCutaneous every 6 hours  piperacillin/tazobactam IVPB. 3.375 Gram(s) IV Intermittent every 12 hours  polyethylene glycol 3350 17 Gram(s) Oral daily  senna Syrup 10 milliLiter(s) Oral at bedtime  vancomycin  IVPB 1000 milliGRAM(s) IV Intermittent every 24 hours    MEDICATIONS  (PRN):  ALBUTerol    90 MICROgram(s) HFA Inhaler 2 Puff(s) Inhalation every 12 hours PRN asthma        LABS                                            10.1                  Neurophils% (auto):   80.5   (03-07 @ 00:34):    4.4  )-----------(140          Lymphocytes% (auto):  10.4                                          30.2                   Eosinphils% (auto):   0.8      Manual%: Neutrophils x    ; Lymphocytes x    ; Eosinophils x    ; Bands%: x    ; Blasts x                                    143    |  102    |  38                  Calcium: 9.0   / iCa: x      (03-07 @ 00:34)    ----------------------------<  201       Magnesium: 2.1                              4.8     |  29     |  0.97             Phosphorous: 3.6      TPro  5.4    /  Alb  2.4    /  TBili  0.6    /  DBili  x      /  AST  35     /  ALT  16     /  AlkPhos  66     07 Mar 2019 00:34    ( 03-07 @ 00:34 )   PT: 13.0 sec;   INR: 1.13 ratio  aPTT: 36.2 sec MICU Transfer Note    Transfer from: MICU    Transfer to: ( x ) Medicine    (  ) Telemetry     (   ) RCU        (    ) Palliative         (   ) Stroke Unit          (   ) __________________  2 adam 221D    Accepting Physician: Dr. Cristi RUDOLPH COURSE:  89 yr old female with PMHx of Alzheimer's, HTN, Afib ( not on AC), THN, CHF (last documented EF from 2015 of 62%), Asthma, cholecystectomy who presents to ED 3/4 from home with c/o progressively worsening SOB / lethargy over a 3-4 day period and LE edema for which she was treated with spironolactone last week. The pt was found was found to be in hypercapnic respiratory failure, was intubated, found to have a Left sided pleural effusion, is s/p a thoracentesis 3/6 (withdrew 750cc) of most likely transudative fluid 2/2 CHF exacerbation vs / and PNA. The pt was successfully extubated yesterday to a BIPAP and transitioned to a NC 3L. It appears that the pt has bronchiectasis, is a chronic CO2 retainer and would suggest BIPAP nocturnal and PRN.          ASSESSMENT & PLAN:   89yo frail female with a Hx of Alzheimer's, CHF, Afib, asthma, bronchiectasis who was admitted with hypercapnia respiratory failure with a noted Left sided pleural effusion s/p thoracentesis. The pt c/w treatment for PNA.    Neuro:  -c/w neuro checks as per protocol  -fall precautions  -PT consult    CV: Hx Afib / CHF / HTN  -c/w asa 81mg qd  -hold antihypertensives for now  -would not anticoagulate pt in the setting of her advanced age, frailty, Alzheimers with her high potential for a fall risk  -f/u Echo 3/6  -would hold lasix and evaluate pt fluid status daily for daily dosing initially    Pulm: Hx Asthma, bronchiectasis, chronic CO2 retainer, pleural effusion  -CIPAP nocturnal and prn  -NC 3L - titrate to maintain an O2 sat >/= 92%  -med nebs  -aspiration precautions    ID: Being treated for PNA   -c/w zosyn, vanco, azithromycin  -consider d/c azithromycin in the setting negative Legionella     GI: no GI Issues  -pt is slow to swallow / PO intake- unsure if true aspiration risk - consider speech pathology for swallow study  -speech and swallow  -will start D5LR at low flow in the setting of the pt being NPO  -consider NGT for tube feeds is aspiration risk- would speak to the pt family they may opt for pleasure feeding for comfort instead of NGT in the event of impaired swallowing    Endo:   -FS q 4H  -HISS     /Renal: Normal renal function        For Followup:  -finger sticks / blood glucose  -fluid status  -swallow study  -NPO now- start a nutritional plan          Vital Signs Last 24 Hrs  T(C): 36.1 (07 Mar 2019 08:00), Max: 37.2 (06 Mar 2019 12:00)  T(F): 97 (07 Mar 2019 08:00), Max: 99 (06 Mar 2019 12:00)  HR: 85 (07 Mar 2019 08:39) (48 - 85)  BP: 98/58 (07 Mar 2019 08:00) (98/58 - 164/67)  BP(mean): 72 (07 Mar 2019 08:00) (72 - 104)  RR: 12 (07 Mar 2019 08:00) (11 - 36)  SpO2: 100% (07 Mar 2019 08:39) (89% - 100%)  I&O's Summary    06 Mar 2019 07:01  -  07 Mar 2019 07:00  --------------------------------------------------------  IN: 794.2 mL / OUT: 830 mL / NET: -35.8 mL    07 Mar 2019 07:01  -  07 Mar 2019 09:32  --------------------------------------------------------  IN: 50 mL / OUT: 45 mL / NET: 5 mL        MEDICATIONS  (STANDING):  ALBUTerol/ipratropium for Nebulization 3 milliLiter(s) Nebulizer every 6 hours  aspirin  chewable 81 milliGRAM(s) Oral daily  azithromycin  IVPB 500 milliGRAM(s) IV Intermittent every 24 hours  heparin  Injectable 5000 Unit(s) SubCutaneous every 12 hours  insulin lispro (HumaLOG) corrective regimen sliding scale   SubCutaneous every 6 hours  piperacillin/tazobactam IVPB. 3.375 Gram(s) IV Intermittent every 12 hours  polyethylene glycol 3350 17 Gram(s) Oral daily  senna Syrup 10 milliLiter(s) Oral at bedtime  vancomycin  IVPB 1000 milliGRAM(s) IV Intermittent every 24 hours    MEDICATIONS  (PRN):  ALBUTerol    90 MICROgram(s) HFA Inhaler 2 Puff(s) Inhalation every 12 hours PRN asthma        LABS                                            10.1                  Neurophils% (auto):   80.5   (03-07 @ 00:34):    4.4  )-----------(140          Lymphocytes% (auto):  10.4                                          30.2                   Eosinphils% (auto):   0.8      Manual%: Neutrophils x    ; Lymphocytes x    ; Eosinophils x    ; Bands%: x    ; Blasts x                                    143    |  102    |  38                  Calcium: 9.0   / iCa: x      (03-07 @ 00:34)    ----------------------------<  201       Magnesium: 2.1                              4.8     |  29     |  0.97             Phosphorous: 3.6      TPro  5.4    /  Alb  2.4    /  TBili  0.6    /  DBili  x      /  AST  35     /  ALT  16     /  AlkPhos  66     07 Mar 2019 00:34    ( 03-07 @ 00:34 )   PT: 13.0 sec;   INR: 1.13 ratio  aPTT: 36.2 sec MICU Transfer Note    Transfer from: MICU    Transfer to: ( x ) Medicine    (  ) Telemetry     (   ) RCU        (    ) Palliative         (   ) Stroke Unit          (   ) __________________  2 adam 221D    Accepting Physician: Dr. Cristi RUDOLPH COURSE:  89 yr old female with PMHx of Alzheimer's, HTN, Afib ( not on AC), THN, CHF (last documented EF from 2015 of 62%), Asthma, cholecystectomy who presents to ED 3/4 from home with c/o progressively worsening SOB / lethargy over a 3-4 day period and LE edema for which she was treated with spironolactone last week. The pt was found was found to be in hypercapnic respiratory failure, was intubated, found to have a Left sided pleural effusion, is s/p a thoracentesis 3/6 (withdrew 750cc) of most likely transudative fluid 2/2 CHF exacerbation vs / and PNA. The pt was successfully extubated yesterday to a BIPAP and transitioned to a NC 3L. It appears that the pt has bronchiectasis, is a chronic CO2 retainer and would suggest BIPAP nocturnal and PRN.          ASSESSMENT & PLAN:   89yo frail female with a Hx of Alzheimer's, CHF, Afib, asthma, bronchiectasis who was admitted with hypercapnia respiratory failure with a noted Left sided pleural effusion s/p thoracentesis. The pt c/w treatment for PNA.    Neuro:  -c/w neuro checks as per protocol  -fall precautions  -PT consult    CV: Hx Afib / CHF / HTN  -c/w asa 81mg qd  -hold antihypertensives for now  -would not anticoagulate pt in the setting of her advanced age, frailty, Alzheimers with her high potential for a fall risk  -f/u Echo 3/6  -would hold lasix and evaluate pt fluid status daily for daily dosing initially    Pulm: Hx Asthma, bronchiectasis, chronic CO2 retainer, pleural effusion  -CIPAP nocturnal and prn  -NC 3L - titrate to maintain an O2 sat >/= 92%  -med nebs  -aspiration precautions    ID: Being treated for PNA   -c/w zosyn, vanco, azithromycin  -consider d/c azithromycin in the setting negative Legionella     GI: no GI Issues  -pt is slow to swallow / PO intake- unsure if true aspiration risk - consider speech pathology for swallow study  -speech and swallow  -will start D5LR at low flow in the setting of the pt being NPO  -consider NGT for tube feeds is aspiration risk- would speak to the pt family they may opt for pleasure feeding for comfort instead of NGT in the event of impaired swallowing    Endo:   -FS q 4H  -HISS     /Renal: Normal renal function        For Followup:  -f/u Cultures  -consider d/c azithromycin in setting of neg legionella  -finger sticks / blood glucose  -fluid status  -swallow study  -NPO now- start a nutritional plan          Vital Signs Last 24 Hrs  T(C): 36.1 (07 Mar 2019 08:00), Max: 37.2 (06 Mar 2019 12:00)  T(F): 97 (07 Mar 2019 08:00), Max: 99 (06 Mar 2019 12:00)  HR: 85 (07 Mar 2019 08:39) (48 - 85)  BP: 98/58 (07 Mar 2019 08:00) (98/58 - 164/67)  BP(mean): 72 (07 Mar 2019 08:00) (72 - 104)  RR: 12 (07 Mar 2019 08:00) (11 - 36)  SpO2: 100% (07 Mar 2019 08:39) (89% - 100%)  I&O's Summary    06 Mar 2019 07:01  -  07 Mar 2019 07:00  --------------------------------------------------------  IN: 794.2 mL / OUT: 830 mL / NET: -35.8 mL    07 Mar 2019 07:01  -  07 Mar 2019 09:32  --------------------------------------------------------  IN: 50 mL / OUT: 45 mL / NET: 5 mL        MEDICATIONS  (STANDING):  ALBUTerol/ipratropium for Nebulization 3 milliLiter(s) Nebulizer every 6 hours  aspirin  chewable 81 milliGRAM(s) Oral daily  azithromycin  IVPB 500 milliGRAM(s) IV Intermittent every 24 hours  heparin  Injectable 5000 Unit(s) SubCutaneous every 12 hours  insulin lispro (HumaLOG) corrective regimen sliding scale   SubCutaneous every 6 hours  piperacillin/tazobactam IVPB. 3.375 Gram(s) IV Intermittent every 12 hours  polyethylene glycol 3350 17 Gram(s) Oral daily  senna Syrup 10 milliLiter(s) Oral at bedtime  vancomycin  IVPB 1000 milliGRAM(s) IV Intermittent every 24 hours    MEDICATIONS  (PRN):  ALBUTerol    90 MICROgram(s) HFA Inhaler 2 Puff(s) Inhalation every 12 hours PRN asthma        LABS                                            10.1                  Neurophils% (auto):   80.5   (03-07 @ 00:34):    4.4  )-----------(140          Lymphocytes% (auto):  10.4                                          30.2                   Eosinphils% (auto):   0.8      Manual%: Neutrophils x    ; Lymphocytes x    ; Eosinophils x    ; Bands%: x    ; Blasts x                                    143    |  102    |  38                  Calcium: 9.0   / iCa: x      (03-07 @ 00:34)    ----------------------------<  201       Magnesium: 2.1                              4.8     |  29     |  0.97             Phosphorous: 3.6      TPro  5.4    /  Alb  2.4    /  TBili  0.6    /  DBili  x      /  AST  35     /  ALT  16     /  AlkPhos  66     07 Mar 2019 00:34    ( 03-07 @ 00:34 )   PT: 13.0 sec;   INR: 1.13 ratio  aPTT: 36.2 sec

## 2019-03-07 NOTE — PROGRESS NOTE ADULT - ASSESSMENT
89 yr old F w/a PMH of Alzheimers, HTN, Atrial fib, 89 yr old F w/a PMH of Alzheimers, HTN, Atrial fib presents with hypercapnic respiratory failure secondary to pleural effusion

## 2019-03-07 NOTE — PROGRESS NOTE ADULT - PROBLEM SELECTOR PLAN 2
Not on A/C at home due to age and fall risk.    Awaiting medical reconciliation, I contacted pharmacy they will fax over med list

## 2019-03-07 NOTE — CONSULT NOTE ADULT - ATTENDING COMMENTS
Patient seen and examined with ID fellow.  Agree with assessment and plan above.    Arturo Hurtado MD  Pager [phone]  After 5pm/weekends call [phone]

## 2019-03-07 NOTE — CHART NOTE - NSCHARTNOTEFT_GEN_A_CORE
MAR Accept Note  Transfer to:   (X) Telemetry  Accepting Physician:  Assigned Room:  PATIENT SEEN AND EXAMINED    HPI/MICU COURSE: 89 yr old female with PMHx of Alzheimer's, HTN, Afib ( not on AC), THN, CHF (last documented EF from 2015 of 62%), Asthma, cholecystectomy who presents to ED 3/4 from home with c/o progressively worsening SOB / lethargy over a 3-4 day period and LE edema for which she was treated with spironolactone last week. The pt was found was found to be in hypercapnic respiratory failure, was intubated, found to have a Left sided pleural effusion, is s/p a thoracentesis 3/6 (withdrew 750cc) of Exudative fluid 2/2 CHF exacerbation vs / and PNA. The pt was successfully extubated yesterday to a BIPAP and transitioned to a NC 3L. It appears that the pt has bronchiectasis, is a chronic CO2 retainer and would suggest BIPAP nocturnal and PRN.        Physical exam:    General: NAD  HEENT: normocephalic, atraumatic, dry mucosa  Neck: supple, neg JVD  Respiratory: Bilaterally equal BS & chest excursion, coarse rhonchi bilaterally auscultated R>L   Cardiovascular: S1S2 Irregular  Abdomen: non distended, +BS x 4  Extremities: negative edema  Skin: warm / dry  Neurological: per discussion with daughter pt is AOx0-1 at baseline, ambulates with assistance. Currently patient does not participate with physical exam.      OBJECTIVE DATA:  Vital Signs Last 24 Hrs  T(C): 36.1 (07 Mar 2019 08:00), Max: 37.2 (06 Mar 2019 12:00)  T(F): 97 (07 Mar 2019 08:00), Max: 99 (06 Mar 2019 12:00)  HR: 70 (07 Mar 2019 10:00) (48 - 85)  BP: 131/56 (07 Mar 2019 10:00) (98/58 - 164/67)  BP(mean): 81 (07 Mar 2019 10:00) (72 - 104)  RR: 17 (07 Mar 2019 10:00) (11 - 36)  SpO2: 92% (07 Mar 2019 10:00) (89% - 100%)    LABS                                            10.1                  Neurophils% (auto):   80.5   (03-07 @ 00:34):    4.4  )-----------(140          Lymphocytes% (auto):  10.4                                          30.2                   Eosinphils% (auto):   0.8      Manual%: Neutrophils x    ; Lymphocytes x    ; Eosinophils x    ; Bands%: x    ; Blasts x                                    143    |  102    |  38                  Calcium: 9.0   / iCa: x      (03-07 @ 00:34)    ----------------------------<  201       Magnesium: 2.1                              4.8     |  29     |  0.97             Phosphorous: 3.6      TPro  5.4    /  Alb  2.4    /  TBili  0.6    /  DBili  x      /  AST  35     /  ALT  16     /  AlkPhos  66     07 Mar 2019 00:34    ( 03-07 @ 00:34 )   PT: 13.0 sec;   INR: 1.13 ratio  aPTT: 36.2 sec    ABG - ( 07 Mar 2019 00:27 )  pH, Arterial: 7.38  pH, Blood: x     /  pCO2: 61    /  pO2: 160   / HCO3: 36    / Base Excess: 9.2   /  SaO2: 99                      ASSESSMENT & PLAN:   89yo frail female with a Hx of Alzheimer's, CHF, Afib, asthma, bronchiectasis who was admitted with hypercapnia respiratory failure with a noted Left sided pleural effusion s/p thoracentesis. The pt c/w treatment for PNA.      For Followup:  - Given Negative urine legionella will d/c Azithro, would also d/c vancomycin as no evidence of HAP, c/w Zosyn for 5 days total  - F/u bronch cultures  - Monitor FSG's  - F/u swallow study, NPO at this time- c/w D5%+ LR  - BIPAP QHS  - [facility] discussion with family Jacob Paredes  Medicine Resident, PGY-3  Spectra [phone]

## 2019-03-08 DIAGNOSIS — Z51.5 ENCOUNTER FOR PALLIATIVE CARE: ICD-10-CM

## 2019-03-08 DIAGNOSIS — Z71.89 OTHER SPECIFIED COUNSELING: ICD-10-CM

## 2019-03-08 DIAGNOSIS — Z29.9 ENCOUNTER FOR PROPHYLACTIC MEASURES, UNSPECIFIED: ICD-10-CM

## 2019-03-08 DIAGNOSIS — G93.40 ENCEPHALOPATHY, UNSPECIFIED: ICD-10-CM

## 2019-03-08 LAB
ALBUMIN SERPL ELPH-MCNC: 2.7 G/DL — LOW (ref 3.3–5)
ALP SERPL-CCNC: 75 U/L — SIGNIFICANT CHANGE UP (ref 40–120)
ALT FLD-CCNC: 20 U/L — SIGNIFICANT CHANGE UP (ref 10–45)
ANION GAP SERPL CALC-SCNC: 12 MMOL/L — SIGNIFICANT CHANGE UP (ref 5–17)
ANISOCYTOSIS BLD QL: SLIGHT — SIGNIFICANT CHANGE UP
APTT BLD: 35.6 SEC — SIGNIFICANT CHANGE UP (ref 27.5–36.3)
AST SERPL-CCNC: 34 U/L — SIGNIFICANT CHANGE UP (ref 10–40)
BASOPHILS # BLD AUTO: 0 K/UL — SIGNIFICANT CHANGE UP (ref 0–0.2)
BASOPHILS NFR BLD AUTO: 0 % — SIGNIFICANT CHANGE UP (ref 0–2)
BILIRUB SERPL-MCNC: 0.7 MG/DL — SIGNIFICANT CHANGE UP (ref 0.2–1.2)
BUN SERPL-MCNC: 31 MG/DL — HIGH (ref 7–23)
CALCIUM SERPL-MCNC: 9.4 MG/DL — SIGNIFICANT CHANGE UP (ref 8.4–10.5)
CHLORIDE SERPL-SCNC: 100 MMOL/L — SIGNIFICANT CHANGE UP (ref 96–108)
CO2 SERPL-SCNC: 32 MMOL/L — HIGH (ref 22–31)
CREAT SERPL-MCNC: 0.91 MG/DL — SIGNIFICANT CHANGE UP (ref 0.5–1.3)
EOSINOPHIL # BLD AUTO: 0 K/UL — SIGNIFICANT CHANGE UP (ref 0–0.5)
EOSINOPHIL NFR BLD AUTO: 0 % — SIGNIFICANT CHANGE UP (ref 0–6)
GLUCOSE SERPL-MCNC: 142 MG/DL — HIGH (ref 70–99)
HCT VFR BLD CALC: 30.7 % — LOW (ref 34.5–45)
HGB BLD-MCNC: 9.5 G/DL — LOW (ref 11.5–15.5)
HYPOCHROMIA BLD QL: SLIGHT — SIGNIFICANT CHANGE UP
INR BLD: 1.04 RATIO — SIGNIFICANT CHANGE UP (ref 0.88–1.16)
LYMPHOCYTES # BLD AUTO: 0.29 K/UL — LOW (ref 1–3.3)
LYMPHOCYTES # BLD AUTO: 5.4 % — LOW (ref 13–44)
MACROCYTES BLD QL: SLIGHT — SIGNIFICANT CHANGE UP
MAGNESIUM SERPL-MCNC: 2.1 MG/DL — SIGNIFICANT CHANGE UP (ref 1.6–2.6)
MANUAL SMEAR VERIFICATION: SIGNIFICANT CHANGE UP
MCHC RBC-ENTMCNC: 30.9 GM/DL — LOW (ref 32–36)
MCHC RBC-ENTMCNC: 31.4 PG — SIGNIFICANT CHANGE UP (ref 27–34)
MCV RBC AUTO: 101.3 FL — HIGH (ref 80–100)
MONOCYTES # BLD AUTO: 0.19 K/UL — SIGNIFICANT CHANGE UP (ref 0–0.9)
MONOCYTES NFR BLD AUTO: 3.6 % — SIGNIFICANT CHANGE UP (ref 2–14)
NEUTROPHILS # BLD AUTO: 4.81 K/UL — SIGNIFICANT CHANGE UP (ref 1.8–7.4)
NEUTROPHILS NFR BLD AUTO: 82.1 % — HIGH (ref 43–77)
NEUTS BAND # BLD: 7.1 % — SIGNIFICANT CHANGE UP (ref 0–8)
PHOSPHATE SERPL-MCNC: 3.3 MG/DL — SIGNIFICANT CHANGE UP (ref 2.5–4.5)
PLAT MORPH BLD: NORMAL — SIGNIFICANT CHANGE UP
PLATELET # BLD AUTO: 168 K/UL — SIGNIFICANT CHANGE UP (ref 150–400)
POLYCHROMASIA BLD QL SMEAR: SLIGHT — SIGNIFICANT CHANGE UP
POTASSIUM SERPL-MCNC: 3.7 MMOL/L — SIGNIFICANT CHANGE UP (ref 3.5–5.3)
POTASSIUM SERPL-SCNC: 3.7 MMOL/L — SIGNIFICANT CHANGE UP (ref 3.5–5.3)
PREALB SERPL-MCNC: 7 MG/DL — LOW (ref 20–40)
PROT SERPL-MCNC: 5.8 G/DL — LOW (ref 6–8.3)
PROTHROM AB SERPL-ACNC: 12 SEC — SIGNIFICANT CHANGE UP (ref 10–13.1)
RBC # BLD: 3.03 M/UL — LOW (ref 3.8–5.2)
RBC # FLD: 17.7 % — HIGH (ref 10.3–14.5)
RBC BLD AUTO: ABNORMAL
SODIUM SERPL-SCNC: 144 MMOL/L — SIGNIFICANT CHANGE UP (ref 135–145)
VARIANT LYMPHS # BLD: 1.8 % — SIGNIFICANT CHANGE UP (ref 0–6)
WBC # BLD: 5.39 K/UL — SIGNIFICANT CHANGE UP (ref 3.8–10.5)
WBC # FLD AUTO: 5.39 K/UL — SIGNIFICANT CHANGE UP (ref 3.8–10.5)

## 2019-03-08 PROCEDURE — 99233 SBSQ HOSP IP/OBS HIGH 50: CPT

## 2019-03-08 PROCEDURE — 99497 ADVNCD CARE PLAN 30 MIN: CPT | Mod: 25

## 2019-03-08 PROCEDURE — 99233 SBSQ HOSP IP/OBS HIGH 50: CPT | Mod: GC

## 2019-03-08 PROCEDURE — 99223 1ST HOSP IP/OBS HIGH 75: CPT

## 2019-03-08 RX ORDER — METOPROLOL TARTRATE 50 MG
5 TABLET ORAL EVERY 6 HOURS
Qty: 0 | Refills: 0 | Status: DISCONTINUED | OUTPATIENT
Start: 2019-03-08 | End: 2019-03-17

## 2019-03-08 RX ORDER — SODIUM CHLORIDE 9 MG/ML
5 INJECTION INTRAMUSCULAR; INTRAVENOUS; SUBCUTANEOUS EVERY 6 HOURS
Qty: 0 | Refills: 0 | Status: DISCONTINUED | OUTPATIENT
Start: 2019-03-08 | End: 2019-03-08

## 2019-03-08 RX ADMIN — Medication 3 MILLILITER(S): at 18:01

## 2019-03-08 RX ADMIN — Medication 2.5 MILLIGRAM(S): at 05:32

## 2019-03-08 RX ADMIN — HEPARIN SODIUM 5000 UNIT(S): 5000 INJECTION INTRAVENOUS; SUBCUTANEOUS at 18:01

## 2019-03-08 RX ADMIN — SODIUM CHLORIDE 30 MILLILITER(S): 9 INJECTION, SOLUTION INTRAVENOUS at 12:10

## 2019-03-08 RX ADMIN — Medication 2.5 MILLIGRAM(S): at 12:09

## 2019-03-08 RX ADMIN — HEPARIN SODIUM 5000 UNIT(S): 5000 INJECTION INTRAVENOUS; SUBCUTANEOUS at 05:32

## 2019-03-08 RX ADMIN — Medication 3 MILLILITER(S): at 05:32

## 2019-03-08 RX ADMIN — PIPERACILLIN AND TAZOBACTAM 25 GRAM(S): 4; .5 INJECTION, POWDER, LYOPHILIZED, FOR SOLUTION INTRAVENOUS at 18:01

## 2019-03-08 RX ADMIN — PIPERACILLIN AND TAZOBACTAM 25 GRAM(S): 4; .5 INJECTION, POWDER, LYOPHILIZED, FOR SOLUTION INTRAVENOUS at 05:32

## 2019-03-08 RX ADMIN — Medication 3 MILLILITER(S): at 12:09

## 2019-03-08 RX ADMIN — Medication 5 MILLIGRAM(S): at 18:01

## 2019-03-08 NOTE — SWALLOW BEDSIDE ASSESSMENT ADULT - DIET PRIOR TO ADMI
Finely chopped solids and thin liquids per family report Chopped/"mushed" solids and thin liquids per family report

## 2019-03-08 NOTE — CONSULT NOTE ADULT - SUBJECTIVE AND OBJECTIVE BOX
HPI:  89 y/o Female with PMHx of Alzheimer's Dementia,  HTN, Afib ( Not on AC)  THN, CHF (last documented EF from 2015 of 62%), Asthma, cholecystectomy who presented to the ED from Home for worsening SOB with Lethargy x 3-4 days with development of productive cough over past 24 hr. Family endorses pt with increase lower extremity edema with Lt>Rt over past few weeks initially treated with Lasix changed to spironolactone last week.   In E.D. pt found to have Vph 7.21, vPCO2 104, placed on bipap therapy, [place] [address]. Pt received Lasix 40 mg IVP, started on vanco/zosyn/zithromax.    Consult called for hypercapnic resp failure 2/2 to r/o CAP vs ADHF        PAST MEDICAL & SURGICAL HISTORY:  Arthritis  Asthma  Diabetes  Hypertension  Alzheimer's dementia  H/O abdominal surgery      FAMILY HISTORY:  No pertinent family history in first degree relatives      SOCIAL HISTORY:  Smoking: [ ] Never Smoked [ ] Former Smoker (__ packs x ___ years) [ ] Current Smoker  (__ packs x ___ years)  Substance Use: [ ] Never Used [ ] Used ____  EtOH Use:  Marital Status: [ ] Single [ ]  [ ]  [ ]   Sexual History:   Occupation:  Recent Travel:  Country of Birth:  Advance Directives:    Allergies    No Known Allergies    Intolerances        HOME MEDICATIONS:    REVIEW OF SYSTEMS:  obtained from family    CONSTITUTIONAL: + weakness, no fevers or chills  EYES/ENT: No visual changes;  No vertigo or throat pain   NECK: No pain or stiffness  RESPIRATORY: + cough, no wheezing, hemoptysis;+ shortness of breath  CARDIOVASCULAR: No chest pain or palpitations  GASTROINTESTINAL: No abdominal or epigastric pain. No nausea, vomiting, or hematemesis; No diarrhea or constipation. No melena or hematochezia.  GENITOURINARY: No dysuria, frequency or hematuria  NEUROLOGICAL: No numbness or weakness  SKIN: No itching, rashes      OBJECTIVE:  ICU Vital Signs Last 24 Hrs  T(C): --  T(F): --  HR: 61 (04 Mar 2019 12:41) (61 - 62)  BP: 147/49 (04 Mar 2019 12:25) (126/78 - 147/49)  BP(mean): --  ABP: --  ABP(mean): --  RR: 19 (04 Mar 2019 12:25) (19 - 19)  SpO2: 99% (04 Mar 2019 12:41) (96% - 100%)    PHYSICAL EXAM:  GENERAL: NAD, well-groomed, well-developed  HEAD:  Atraumatic, Normocephalic  EYES: EOMI, PERRLA, conjunctiva and sclera clear  ENMT: No oropharyngeal exudates, erythema or lesions,  Moist mucous membranes  NECK: Supple, no cervical lymphadenopathy, no JVD  NERVOUS SYSTEM:  Lethargic , with tactile stimuli has spontaneous purposeful movement of all 4 extremites 4/5 BUE and BLE motor strength, full sensation to light touch   CHEST/LUNG: Breath sounds bilat, diminished in lower lung fields bases to 1/4 up in Lt, 1/3 up in Rt. few bibasilar crackles  HEART: Afib ventricular response of 60; S1/S2 I?VI sys murmur, without rubs, or gallops  ABDOMEN: Soft, Nontender, Nondistended; Bowel sounds present, Bladder non distended, non palpable  EXTREMITIES: Pulses palpable radial pulses 2+ bilat, DP/PT 1+/1+ bilat, without clubbing,cyanosis. Digits of feet slight cool to touch with cap refill > 3 secs< 6 secs, digits of hands warm to touch with good cap refill < 3 sec. Lower extremities with 1+ pitting edema in RLE, 2+ pitting in LLE  Skin: warm, dry, intact, normal color, no rash or abnormal lesions,without palpable nodes      HOSPITAL MEDICATIONS:  MEDICATIONS  (STANDING):  azithromycin  IVPB 500 milliGRAM(s) IV Intermittent Once  furosemide   Injectable 40 milliGRAM(s) IV Push Once  piperacillin/tazobactam IVPB. 3.375 Gram(s) IV Intermittent once  vancomycin  IVPB 1000 milliGRAM(s) IV Intermittent once    MEDICATIONS  (PRN):      LABS:                        11.9   6.6   )-----------( 233      ( 04 Mar 2019 11:40 )             37.1     Hgb Trend: 11.9<--  03-04    144  |  99  |  47<H>  ----------------------------<  269<H>  5.1   |  31  |  1.39<H>    Ca    9.9      04 Mar 2019 11:40    TPro  7.4  /  Alb  3.6  /  TBili  0.7  /  DBili  x   /  AST  36  /  ALT  19  /  AlkPhos  106  03-04    Creatinine Trend: 1.39<--  PT/INR - ( 04 Mar 2019 11:40 )   PT: 11.9 sec;   INR: 1.03 ratio         PTT - ( 04 Mar 2019 11:40 )  PTT:27.5 sec      Venous Blood Gas:  03-04 @ 11:39  7.21/104/36/40/41  VBG Lactate: 2.7      MICROBIOLOGY:     RADIOLOGY:  [ ] Reviewed and interpreted by me    EKG: (04 Mar 2019 17:42)    PERTINENT PM/SXH:   Arthritis  Asthma  Diabetes  Hypertension  Alzheimer's dementia  No pertinent past medical history    H/O abdominal surgery    FAMILY HISTORY:  No pertinent family history in first degree relatives    ITEMS NOT CHECKED ARE NOT PRESENT    SOCIAL HISTORY:   Significant other/partner:  [ ]  Children:  [ ]  Yazidi/Spirituality:  Substance hx:  [ ]   Tobacco hx:  [ ]   Alcohol hx: [ ]   Home Opioid hx:  [ ] I-Stop Reference No:  Living Situation: [ ]Home  [ ]Long term care  [ ]Rehab [ ]Other    ADVANCE DIRECTIVES:    DNR  Yes  MOLST  [ ]  Living Will  [ ]   DECISION MAKER(s):  [ ] Health Care Proxy(s)  [ ] Surrogate(s)  [ ] Guardian [x ]  No HCP, All 6 children are involved in decision making       Name(s): Phone Number(s): Spoke to Chantel Massey  (Daughter) [phone]    BASELINE (I)ADL(s) (prior to admission):  [place]: [ ]Total  [ x] Moderate [ ]Dependent    Allergies    No Known Allergies    Intolerances    MEDICATIONS  (STANDING):  ALBUTerol/ipratropium for Nebulization 3 milliLiter(s) Nebulizer every 6 hours  aspirin  chewable 81 milliGRAM(s) Oral daily  dextrose 5% + lactated ringers. 1000 milliLiter(s) (30 mL/Hr) IV Continuous <Continuous>  dextrose 5%. 1000 milliLiter(s) (50 mL/Hr) IV Continuous <Continuous>  dextrose 50% Injectable 12.5 Gram(s) IV Push once  dextrose 50% Injectable 25 Gram(s) IV Push once  dextrose 50% Injectable 25 Gram(s) IV Push once  heparin  Injectable 5000 Unit(s) SubCutaneous every 12 hours  insulin lispro (HumaLOG) corrective regimen sliding scale   SubCutaneous every 6 hours  metoprolol tartrate Injectable 2.5 milliGRAM(s) IV Push every 6 hours  piperacillin/tazobactam IVPB. 3.375 Gram(s) IV Intermittent every 12 hours  polyethylene glycol 3350 17 Gram(s) Oral daily  senna Syrup 10 milliLiter(s) Oral at bedtime    MEDICATIONS  (PRN):  ALBUTerol    90 MICROgram(s) HFA Inhaler 2 Puff(s) Inhalation every 12 hours PRN asthma  dextrose 40% Gel 15 Gram(s) Oral once PRN Blood Glucose LESS THAN 70 milliGRAM(s)/deciliter  glucagon  Injectable 1 milliGRAM(s) IntraMuscular once PRN Glucose LESS THAN 70 milligrams/deciliter    PRESENT SYMPTOMS: x[ ]Unable to obtain due to poor mentation   Source if other than patient:  [ x]Family   [ ]Team     Pain (Impact on QOL):    Location -         Minimal acceptable level (0-10 scale):                    Aggravating factors -  Quality -  Radiation -  Severity (0-10 scale) -    Timing -    PAIN AD Score:     [email or web address] (press ctrl +  left click to view)    Dyspnea:                           [ ]Mild [ x]Moderate [ ]Severe  Anxiety:                             [ ]Mild [ ]Moderate [ ]Severe  Fatigue:                             [ ]Mild [x ]Moderate [ ]Severe  Nausea:                             [ ]Mild [ ]Moderate [ ]Severe  Loss of appetite:              [ ]Mild [ ]Moderate [ ]Severe  Constipation:                    [ ]Mild [ ]Moderate [ ]Severe    Other Symptoms:  [x ]All other review of systems negative     Karnofsky Performance Score/Palliative Performance Status Version 2:    20  %    http://palliative.info/resource_material/[email or web address].pdf  PHYSICAL EXAM:  Vital Signs Last 24 Hrs  T(C): 36.6 (08 Mar 2019 09:10), Max: 37.4 (08 Mar 2019 00:19)  T(F): 97.9 (08 Mar 2019 09:10), Max: 99.3 (08 Mar 2019 00:19)  HR: 80 (08 Mar 2019 11:41) (60 - 100)  BP: 154/60 (08 Mar 2019 09:10) (118/62 - 160/56)  BP(mean): --  RR: 20 (08 Mar 2019 09:10) (18 - 20)  SpO2: 91% (08 Mar 2019 11:41) (91% - 100%) I&O's Summary    07 Mar 2019 07:01  -  08 Mar 2019 07:00  --------------------------------------------------------  IN: 1140 mL / OUT: 45 mL / NET: 1095 mL    08 Mar 2019 07:01  -  08 Mar 2019 12:52  --------------------------------------------------------  IN: 180 mL / OUT: 75 mL / NET: 105 mL    GENERAL:  [ ]Alert  [ ]Oriented  [ ]Lethargic  [ ]Cachexia  [ ]Unarousable  [ ]Verbal  [x ]Non-Verbal but could be verbal  Behavioral:   [ ] Anxiety  [ ] Delirium [ ] Agitation [ ] Other  HEENT:  [ x]Normal   [ ]Dry mouth   [ ]ET Tube/Trach  [ ]Oral lesions  PULMONARY:   [ ]Clear [ ]Tachypnea  [ ]Audible excessive secretions   [x ]Rhonchi        [ ]Right [ ]Left [x ]Bilateral  [ ]Crackles        [ ]Right [ ]Left [ ]Bilateral  [ ]Wheezing     [ ]Right [ ]Left [ ]Bilateral  CARDIOVASCULAR:    [x ]Regular [ ]Irregular [ ]Tachy  [ ]Alfonso [ ]Murmur [ ]Other  GASTROINTESTINAL:  [ ]Soft  [ ]Distended   [ ]+BS  [ ]Non tender [ ]Tender  [ ]PEG [ ]OGT/ NGT  Last BM: 3/7  GENITOURINARY:  [ ]Normal [x ] Incontinent   [ ]Oliguria/Anuria   [ ]Sawant  MUSCULOSKELETAL:   [ ]Normal   [ x]Weakness  [ x]Bed/Wheelchair bound [ x]Edema: 1+ LLE  NEUROLOGIC:   [ ]No focal deficits  [x ] Cognitive impairment  [ ] Dysphagia [ ]Dysarthria [ ] Paresis [ ]Other   SKIN:   [x ]Normal   [ ]Pressure ulcer(s)  [ ]Rash    CRITICAL CARE:  [ ] Shock Present  [ ]Septic [ ]Cardiogenic [ ]Neurologic [ ]Hypovolemic  [ ]  Vasopressors [ ]  Inotropes   [x ] Respiratory failure present  [x ] Acute  [ ] Chronic [ ] Hypoxic  [ ] Hypercarbic [ ] Other  [ ] Other organ failure     LABS:                        9.5    5.39  )-----------( 168      ( 08 Mar 2019 10:06 )             30.7   03-08    144  |  100  |  31<H>  ----------------------------<  142<H>  3.7   |  32<H>  |  0.91    Ca    9.4      08 Mar 2019 07:16  Phos  3.3     03-08  Mg     2.1     03-08    TPro  5.8<L>  /  Alb  2.7<L>  /  TBili  0.7  /  DBili  x   /  AST  34  /  ALT  20  /  AlkPhos  75  03-08  PT/INR - ( 08 Mar 2019 10:02 )   PT: 12.0 sec;   INR: 1.04 ratio    PTT - ( 08 Mar 2019 10:02 )  PTT:35.6 sec      RADIOLOGY & ADDITIONAL STUDIES:  CT Abdomen: 3/7/19  Bilateral pleural effusions, left greater than right. Parenchymal consolidation in the right lower lobe has increased since 3/4/2019, and there are central areas of cavitation, suspicious for   necrotizing pneumonia.  -Diffuse colitis involving the descending colon. Infectious, ischemic, and inflammatory etiologies are considered.  -Small to moderate volume of ascites.    Cxray: 3/7/19  -Interstitial edema and bilateral effusions are unchanged. Right apical pleural thickening is stable. Right upper lobe focal area of scarring or atelectasis also stable.    PROTEIN CALORIE MALNUTRITION PRESENT: [ ] Yes [ ] No  [ ] PPSV2 < or = to 30% [ ] significant weight loss  [ ] poor nutritional intake [ ] catabolic state [ ] anasarca     Albumin, Serum: 2.7 g/dL (03-08-19 @ 07:16)  Artificial Nutrition [ ]     REFERRALS:   [ ]Chaplaincy  [ ] Hospice  [ ]Child Life  [ ]Social Work  [ ]Case management [ ]Holistic Therapy   Goals of Care Discussion Document: HPI:  89 y/o Female with PMHx of Alzheimer's Dementia,  HTN, Afib ( Not on AC)  THN, CHF (last documented EF from 2015 of 62%), Asthma, cholecystectomy who presented to the ED from Home for worsening SOB with Lethargy x 3-4 days with development of productive cough over past 24 hr. Family endorses pt with increase lower extremity edema with Lt>Rt over past few weeks initially treated with Lasix changed to spironolactone last week.   In E.D. pt found to have Vph 7.21, vPCO2 104, placed on bipap therapy, [place] [address]. Pt received Lasix 40 mg IVP, started on vanco/zosyn/zithromax.    Consult called for hypercapnic resp failure 2/2 to r/o CAP vs ADHF      PAST MEDICAL & SURGICAL HISTORY:  Arthritis  Asthma  Diabetes  Hypertension  Alzheimer's dementia  H/O abdominal surgery      FAMILY HISTORY:  No pertinent family history in first degree relatives      SOCIAL HISTORY:  Smoking: [ ] Never Smoked [ ] Former Smoker (__ packs x ___ years) [ ] Current Smoker  (__ packs x ___ years)  Substance Use: [ ] Never Used [ ] Used ____  EtOH Use:  Marital Status: [ ] Single [ ]  [ ]  [x ]   Sexual History:   Occupation:  Recent Travel:  Country of Birth:  Advance Directives:    Allergies    No Known Allergies    Intolerances        HOME MEDICATIONS:    REVIEW OF SYSTEMS:  obtained from family    CONSTITUTIONAL: + weakness, no fevers or chills  EYES/ENT: No visual changes;  No vertigo or throat pain   NECK: No pain or stiffness  RESPIRATORY: + cough, no wheezing, hemoptysis;+ shortness of breath  CARDIOVASCULAR: No chest pain or palpitations  GASTROINTESTINAL: No abdominal or epigastric pain. No nausea, vomiting, or hematemesis; No diarrhea or constipation. No melena or hematochezia.  GENITOURINARY: No dysuria, frequency or hematuria  NEUROLOGICAL: No numbness or weakness  SKIN: No itching, rashes      OBJECTIVE:  ICU Vital Signs Last 24 Hrs  T(C): --  T(F): --  HR: 61 (04 Mar 2019 12:41) (61 - 62)  BP: 147/49 (04 Mar 2019 12:25) (126/78 - 147/49)  BP(mean): --  ABP: --  ABP(mean): --  RR: 19 (04 Mar 2019 12:25) (19 - 19)  SpO2: 99% (04 Mar 2019 12:41) (96% - 100%)    PHYSICAL EXAM:  GENERAL: NAD, well-groomed, well-developed  HEAD:  Atraumatic, Normocephalic  EYES: EOMI, PERRLA, conjunctiva and sclera clear  ENMT: No oropharyngeal exudates, erythema or lesions,  Moist mucous membranes  NECK: Supple, no cervical lymphadenopathy, no JVD  NERVOUS SYSTEM:  Lethargic , with tactile stimuli has spontaneous purposeful movement of all 4 extremites 4/5 BUE and BLE motor strength, full sensation to light touch   CHEST/LUNG: Breath sounds bilat, diminished in lower lung fields bases to 1/4 up in Lt, 1/3 up in Rt. few bibasilar crackles  HEART: Afib ventricular response of 60; S1/S2 I?VI sys murmur, without rubs, or gallops  ABDOMEN: Soft, Nontender, Nondistended; Bowel sounds present, Bladder non distended, non palpable  EXTREMITIES: Pulses palpable radial pulses 2+ bilat, DP/PT 1+/1+ bilat, without clubbing,cyanosis. Digits of feet slight cool to touch with cap refill > 3 secs< 6 secs, digits of hands warm to touch with good cap refill < 3 sec. Lower extremities with 1+ pitting edema in RLE, 2+ pitting in LLE  Skin: warm, dry, intact, normal color, no rash or abnormal lesions,without palpable nodes      Venous Blood Gas:  03-04 @ 11:39  7.21/104/36/40/41  VBG Lactate: 2.7    ITEMS NOT CHECKED ARE NOT PRESENT    SOCIAL HISTORY:   Significant other/partner:  [ ]  Children:  [ ]  Zoroastrian/Spirituality:  Substance hx:  [ ]   Tobacco hx:  [ ]   Alcohol hx: [ ]   Home Opioid hx:  [ ] I-Stop Reference No:  Living Situation: [x ]Home  [ ]Long term care  [ ]Rehab [ ]Other    ADVANCE DIRECTIVES:    DNR  Yes  MOLST  [x ]  Living Will  [ ]   DECISION MAKER(s):  [ ] Health Care Proxy(s)  [ ] Surrogate(s)  [ ] Guardian [x ]  No HCP, All 6 children are involved in decision making       Name(s): Phone Number(s): Spoke to Chantel Massey  (Daughter) [phone]    BASELINE (I)ADL(s) (prior to admission):  [place]: [ ]Total  [ x] Moderate [ ]Dependent    Allergies    No Known Allergies    Intolerances    MEDICATIONS  (STANDING):  ALBUTerol/ipratropium for Nebulization 3 milliLiter(s) Nebulizer every 6 hours  aspirin  chewable 81 milliGRAM(s) Oral daily  dextrose 5% + lactated ringers. 1000 milliLiter(s) (30 mL/Hr) IV Continuous <Continuous>  dextrose 5%. 1000 milliLiter(s) (50 mL/Hr) IV Continuous <Continuous>  dextrose 50% Injectable 12.5 Gram(s) IV Push once  dextrose 50% Injectable 25 Gram(s) IV Push once  dextrose 50% Injectable 25 Gram(s) IV Push once  heparin  Injectable 5000 Unit(s) SubCutaneous every 12 hours  insulin lispro (HumaLOG) corrective regimen sliding scale   SubCutaneous every 6 hours  metoprolol tartrate Injectable 2.5 milliGRAM(s) IV Push every 6 hours  piperacillin/tazobactam IVPB. 3.375 Gram(s) IV Intermittent every 12 hours  polyethylene glycol 3350 17 Gram(s) Oral daily  senna Syrup 10 milliLiter(s) Oral at bedtime    MEDICATIONS  (PRN):  ALBUTerol    90 MICROgram(s) HFA Inhaler 2 Puff(s) Inhalation every 12 hours PRN asthma  dextrose 40% Gel 15 Gram(s) Oral once PRN Blood Glucose LESS THAN 70 milliGRAM(s)/deciliter  glucagon  Injectable 1 milliGRAM(s) IntraMuscular once PRN Glucose LESS THAN 70 milligrams/deciliter    PRESENT SYMPTOMS: x[ ]Unable to obtain due to poor mentation   Source if other than patient:  [ x]Family   [ ]Team     Pain (Impact on QOL):    Location -         Minimal acceptable level (0-10 scale):                    Aggravating factors -  Quality -  Radiation -  Severity (0-10 scale) -    Timing -    PAIN AD Score:     [email or web address] (press ctrl +  left click to view)    Dyspnea:                           [ ]Mild [ x]Moderate [ ]Severe  Anxiety:                             [ ]Mild [ ]Moderate [ ]Severe  Fatigue:                             [ ]Mild [x ]Moderate [ ]Severe  Nausea:                             [ ]Mild [ ]Moderate [ ]Severe  Loss of appetite:              [ ]Mild [ ]Moderate [ ]Severe  Constipation:                    [ ]Mild [ ]Moderate [ ]Severe    Other Symptoms:  [x ]All other review of systems negative     Karnofsky Performance Score/Palliative Performance Status Version 2:    20  %    http://palliative.info/resource_material/[email or web address].pdf  PHYSICAL EXAM:  Vital Signs Last 24 Hrs  T(C): 36.6 (08 Mar 2019 09:10), Max: 37.4 (08 Mar 2019 00:19)  T(F): 97.9 (08 Mar 2019 09:10), Max: 99.3 (08 Mar 2019 00:19)  HR: 80 (08 Mar 2019 11:41) (60 - 100)  BP: 154/60 (08 Mar 2019 09:10) (118/62 - 160/56)  BP(mean): --  RR: 20 (08 Mar 2019 09:10) (18 - 20)  SpO2: 91% (08 Mar 2019 11:41) (91% - 100%) I&O's Summary    07 Mar 2019 07:01  -  08 Mar 2019 07:00  --------------------------------------------------------  IN: 1140 mL / OUT: 45 mL / NET: 1095 mL    08 Mar 2019 07:01  -  08 Mar 2019 12:52  --------------------------------------------------------  IN: 180 mL / OUT: 75 mL / NET: 105 mL    GENERAL:  [ ]Alert  [ ]Oriented  [ ]Lethargic  [ ]Cachexia  [ ]Unarousable  [ ]Verbal  [x ]Non-Verbal but could be verbal  Behavioral:   [ ] Anxiety  [ ] Delirium [ ] Agitation [ ] Other  HEENT:  [ x]Normal   [ ]Dry mouth   [ ]ET Tube/Trach  [ ]Oral lesions  PULMONARY:   [ ]Clear [ ]Tachypnea  [ ]Audible excessive secretions   [x ]Rhonchi        [ ]Right [ ]Left [x ]Bilateral  [ ]Crackles        [ ]Right [ ]Left [ ]Bilateral  [ ]Wheezing     [ ]Right [ ]Left [ ]Bilateral  CARDIOVASCULAR:    [x ]Regular [ ]Irregular [ ]Tachy  [ ]Alfonso [ ]Murmur [ ]Other  GASTROINTESTINAL:  [ ]Soft  [ ]Distended   [ ]+BS  [ ]Non tender [ ]Tender  [ ]PEG [ ]OGT/ NGT  Last BM: 3/7  GENITOURINARY:  [ ]Normal [x ] Incontinent   [ ]Oliguria/Anuria   [ ]Sawant  MUSCULOSKELETAL:   [ ]Normal   [ x]Weakness  [ x]Bed/Wheelchair bound [ x]Edema: 1+ LLE  NEUROLOGIC:   [ ]No focal deficits  [x ] Cognitive impairment  [ ] Dysphagia [ ]Dysarthria [ ] Paresis [ ]Other   SKIN:   [x ]Normal   [ ]Pressure ulcer(s)  [ ]Rash    CRITICAL CARE:  [ ] Shock Present  [ ]Septic [ ]Cardiogenic [ ]Neurologic [ ]Hypovolemic  [ ]  Vasopressors [ ]  Inotropes   [x ] Respiratory failure present  [x ] Acute  [ ] Chronic [ ] Hypoxic  [ ] Hypercarbic [ ] Other  [ ] Other organ failure     LABS:                        9.5    5.39  )-----------( 168      ( 08 Mar 2019 10:06 )             30.7   03-08    144  |  100  |  31<H>  ----------------------------<  142<H>  3.7   |  32<H>  |  0.91    Ca    9.4      08 Mar 2019 07:16  Phos  3.3     03-08  Mg     2.1     03-08    TPro  5.8<L>  /  Alb  2.7<L>  /  TBili  0.7  /  DBili  x   /  AST  34  /  ALT  20  /  AlkPhos  75  03-08  PT/INR - ( 08 Mar 2019 10:02 )   PT: 12.0 sec;   INR: 1.04 ratio    PTT - ( 08 Mar 2019 10:02 )  PTT:35.6 sec      RADIOLOGY & ADDITIONAL STUDIES:  CT Abdomen: 3/7/19  Bilateral pleural effusions, left greater than right. Parenchymal consolidation in the right lower lobe has increased since 3/4/2019, and there are central areas of cavitation, suspicious for   necrotizing pneumonia.  -Diffuse colitis involving the descending colon. Infectious, ischemic, and inflammatory etiologies are considered.  -Small to moderate volume of ascites.    Cxray: 3/7/19  -Interstitial edema and bilateral effusions are unchanged. Right apical pleural thickening is stable. Right upper lobe focal area of scarring or atelectasis also stable.    PROTEIN CALORIE MALNUTRITION PRESENT: [ ] Yes [ ] No  [ ] PPSV2 < or = to 30% [ ] significant weight loss  [x ] poor nutritional intake [ ] catabolic state [ ] anasarca     Albumin, Serum: 2.7 g/dL (03-08-19 @ 07:16)  Artificial Nutrition [ ]     REFERRALS:   [ ]Chaplaincy  [ ] Hospice  [ ]Child Life  [ ]Social Work  [ ]Case management [ ]Holistic Therapy   Goals of Care Discussion Document: HPI:  89 y/o Female with PMHx of Alzheimer's Dementia,  HTN, Afib ( Not on AC)  THN, CHF (last documented EF from 2015 of 62%), Asthma, cholecystectomy who presented to the ED from Home for worsening SOB with Lethargy x 3-4 days with development of productive cough over past 24 hr. Family endorses pt with increase lower extremity edema with Lt>Rt over past few weeks initially treated with Lasix changed to spironolactone last week.   In E.D. pt found to have Vph 7.21, vPCO2 104, placed on bipap therapy, [place] 02[address]. Pt received Lasix 40 mg IVP, started on vanco/zosyn/zithromax.    Consult called for hypercapnic resp failure 2/2 to r/o CAP vs ADHF      PAST MEDICAL & SURGICAL HISTORY:  Arthritis  Asthma  Diabetes  Hypertension  Alzheimer's dementia  H/O abdominal surgery      FAMILY HISTORY:  No pertinent family history in first degree relatives      SOCIAL HISTORY:  Smoking: [ ] Never Smoked [ ] Former Smoker (__ packs x ___ years) [ ] Current Smoker  (__ packs x ___ years)  Substance Use: [ ] Never Used [ ] Used ____  EtOH Use:  Marital Status: [ ] Single [ ]  [ ]  [x ]   Sexual History:   Occupation:  Recent Travel:  Country of Birth:  Advance Directives:      HOME MEDICATIONS:    REVIEW OF SYSTEMS:  obtained from family    CONSTITUTIONAL: + weakness, no fevers or chills  EYES/ENT: No visual changes;  No vertigo or throat pain   NECK: No pain or stiffness  RESPIRATORY: + cough, no wheezing, hemoptysis;+ shortness of breath  CARDIOVASCULAR: No chest pain or palpitations  GASTROINTESTINAL: No abdominal or epigastric pain. No nausea, vomiting, or hematemesis; No diarrhea or constipation. No melena or hematochezia.  GENITOURINARY: No dysuria, frequency or hematuria  NEUROLOGICAL: No numbness or weakness  SKIN: No itching, rashes      OBJECTIVE:  ICU Vital Signs Last 24 Hrs  T(C): --  T(F): --  HR: 61 (04 Mar 2019 12:41) (61 - 62)  BP: 147/49 (04 Mar 2019 12:25) (126/78 - 147/49)  BP(mean): --  ABP: --  ABP(mean): --  RR: 19 (04 Mar 2019 12:25) (19 - 19)  SpO2: 99% (04 Mar 2019 12:41) (96% - 100%)    PHYSICAL EXAM:  GENERAL: NAD, well-groomed, well-developed  HEAD:  Atraumatic, Normocephalic  EYES: EOMI, PERRLA, conjunctiva and sclera clear  ENMT: No oropharyngeal exudates, erythema or lesions,  Moist mucous membranes  NECK: Supple, no cervical lymphadenopathy, no JVD  NERVOUS SYSTEM:  Lethargic , with tactile stimuli has spontaneous purposeful movement of all 4 extremites 4/5 BUE and BLE motor strength, full sensation to light touch   CHEST/LUNG: Breath sounds bilat, diminished in lower lung fields bases to 1/4 up in Lt, 1/3 up in Rt. few bibasilar crackles  HEART: Afib ventricular response of 60; S1/S2 I?VI sys murmur, without rubs, or gallops  ABDOMEN: Soft, Nontender, Nondistended; Bowel sounds present, Bladder non distended, non palpable  EXTREMITIES: Pulses palpable radial pulses 2+ bilat, DP/PT 1+/1+ bilat, without clubbing,cyanosis. Digits of feet slight cool to touch with cap refill > 3 secs< 6 secs, digits of hands warm to touch with good cap refill < 3 sec. Lower extremities with 1+ pitting edema in RLE, 2+ pitting in LLE  Skin: warm, dry, intact, normal color, no rash or abnormal lesions,without palpable nodes      Venous Blood Gas:  03-04 @ 11:39  7.21/104/36/40/41  VBG Lactate: 2.7    ITEMS NOT CHECKED ARE NOT PRESENT    SOCIAL HISTORY:   Significant other/partner:  [ ]  Children:  [ ]  Advent/Spirituality:  Substance hx:  [ ]   Tobacco hx:  [ ]   Alcohol hx: [ ]   Home Opioid hx:  [ ] I-Stop Reference No:  Living Situation: [x ]Home  [ ]Long term care  [ ]Rehab [ ]Other    ADVANCE DIRECTIVES:    DNR  Yes  MOLST  [x ]  Living Will  [ ]   DECISION MAKER(s):  [ ] Health Care Proxy(s)  [ ] Surrogate(s)  [ ] Guardian [x ]  No HCP, All 6 children are involved in decision making       Name(s): Phone Number(s): Spoke to Chantel Massey  (Daughter) [phone]    BASELINE (I)ADL(s) (prior to admission):  [place]: [ ]Total  [ x] Moderate [ ]Dependent    Allergies    No Known Allergies    Intolerances    MEDICATIONS  (STANDING):  ALBUTerol/ipratropium for Nebulization 3 milliLiter(s) Nebulizer every 6 hours  aspirin  chewable 81 milliGRAM(s) Oral daily  dextrose 5% + lactated ringers. 1000 milliLiter(s) (30 mL/Hr) IV Continuous <Continuous>  dextrose 5%. 1000 milliLiter(s) (50 mL/Hr) IV Continuous <Continuous>  dextrose 50% Injectable 12.5 Gram(s) IV Push once  dextrose 50% Injectable 25 Gram(s) IV Push once  dextrose 50% Injectable 25 Gram(s) IV Push once  heparin  Injectable 5000 Unit(s) SubCutaneous every 12 hours  insulin lispro (HumaLOG) corrective regimen sliding scale   SubCutaneous every 6 hours  metoprolol tartrate Injectable 2.5 milliGRAM(s) IV Push every 6 hours  piperacillin/tazobactam IVPB. 3.375 Gram(s) IV Intermittent every 12 hours  polyethylene glycol 3350 17 Gram(s) Oral daily  senna Syrup 10 milliLiter(s) Oral at bedtime    MEDICATIONS  (PRN):  ALBUTerol    90 MICROgram(s) HFA Inhaler 2 Puff(s) Inhalation every 12 hours PRN asthma  dextrose 40% Gel 15 Gram(s) Oral once PRN Blood Glucose LESS THAN 70 milliGRAM(s)/deciliter  glucagon  Injectable 1 milliGRAM(s) IntraMuscular once PRN Glucose LESS THAN 70 milligrams/deciliter    PRESENT SYMPTOMS: x[ ]Unable to obtain due to poor mentation   Source if other than patient:  [ x]Family   [ ]Team     Pain (Impact on QOL):    Location -         Minimal acceptable level (0-10 scale):                    Aggravating factors -  Quality -  Radiation -  Severity (0-10 scale) -    Timing -    PAIN AD Score:     [email or web address] (press ctrl +  left click to view)    Dyspnea:                           [ ]Mild [ x]Moderate [ ]Severe  Anxiety:                             [ ]Mild [ ]Moderate [ ]Severe  Fatigue:                             [ ]Mild [x ]Moderate [ ]Severe  Nausea:                             [ ]Mild [ ]Moderate [ ]Severe  Loss of appetite:              [ ]Mild [ ]Moderate [ ]Severe  Constipation:                    [ ]Mild [ ]Moderate [ ]Severe    Other Symptoms:  [x ]All other review of systems negative     Karnofsky Performance Score/Palliative Performance Status Version 2:    20  %    http://palliative.info/resource_material/[email or web address].pdf  PHYSICAL EXAM:  Vital Signs Last 24 Hrs  T(C): 36.6 (08 Mar 2019 09:10), Max: 37.4 (08 Mar 2019 00:19)  T(F): 97.9 (08 Mar 2019 09:10), Max: 99.3 (08 Mar 2019 00:19)  HR: 80 (08 Mar 2019 11:41) (60 - 100)  BP: 154/60 (08 Mar 2019 09:10) (118/62 - 160/56)  BP(mean): --  RR: 20 (08 Mar 2019 09:10) (18 - 20)  SpO2: 91% (08 Mar 2019 11:41) (91% - 100%) I&O's Summary    07 Mar 2019 07:01  -  08 Mar 2019 07:00  --------------------------------------------------------  IN: 1140 mL / OUT: 45 mL / NET: 1095 mL    08 Mar 2019 07:01  -  08 Mar 2019 12:52  --------------------------------------------------------  IN: 180 mL / OUT: 75 mL / NET: 105 mL    GENERAL:  [ ]Alert  [ ]Oriented  [ ]Lethargic  [ ]Cachexia  [ ]Unarousable  [ ]Verbal  [x ]Non-Verbal but could be verbal  Behavioral:   [ ] Anxiety  [ ] Delirium [ ] Agitation [ ] Other  HEENT:  [ x]Normal   [ ]Dry mouth   [ ]ET Tube/Trach  [ ]Oral lesions  PULMONARY:   [ ]Clear [ ]Tachypnea  [ ]Audible excessive secretions   [x ]Rhonchi        [ ]Right [ ]Left [x ]Bilateral  [ ]Crackles        [ ]Right [ ]Left [ ]Bilateral  [ ]Wheezing     [ ]Right [ ]Left [ ]Bilateral  CARDIOVASCULAR:    [x ]Regular [ ]Irregular [ ]Tachy  [ ]Alfonso [ ]Murmur [ ]Other  GASTROINTESTINAL:  [ ]Soft  [ ]Distended   [ ]+BS  [ ]Non tender [ ]Tender  [ ]PEG [ ]OGT/ NGT  Last BM: 3/7  GENITOURINARY:  [ ]Normal [x ] Incontinent   [ ]Oliguria/Anuria   [ ]Sawant  MUSCULOSKELETAL:   [ ]Normal   [ x]Weakness  [ x]Bed/Wheelchair bound [ x]Edema: 1+ LLE  NEUROLOGIC:   [ ]No focal deficits  [x ] Cognitive impairment  [ ] Dysphagia [ ]Dysarthria [ ] Paresis [ ]Other   SKIN:   [x ]Normal   [ ]Pressure ulcer(s)  [ ]Rash    CRITICAL CARE:  [ ] Shock Present  [ ]Septic [ ]Cardiogenic [ ]Neurologic [ ]Hypovolemic  [ ]  Vasopressors [ ]  Inotropes   [x ] Respiratory failure present  [x ] Acute  [ ] Chronic [ ] Hypoxic  [ ] Hypercarbic [ ] Other  [ ] Other organ failure     LABS:                        9.5    5.39  )-----------( 168      ( 08 Mar 2019 10:06 )             30.7   03-08    144  |  100  |  31<H>  ----------------------------<  142<H>  3.7   |  32<H>  |  0.91    Ca    9.4      08 Mar 2019 07:16  Phos  3.3     03-08  Mg     2.1     03-08    TPro  5.8<L>  /  Alb  2.7<L>  /  TBili  0.7  /  DBili  x   /  AST  34  /  ALT  20  /  AlkPhos  75  03-08  PT/INR - ( 08 Mar 2019 10:02 )   PT: 12.0 sec;   INR: 1.04 ratio    PTT - ( 08 Mar 2019 10:02 )  PTT:35.6 sec      RADIOLOGY & ADDITIONAL STUDIES:  CT Abdomen: 3/7/19  Bilateral pleural effusions, left greater than right. Parenchymal consolidation in the right lower lobe has increased since 3/4/2019, and there are central areas of cavitation, suspicious for   necrotizing pneumonia.  -Diffuse colitis involving the descending colon. Infectious, ischemic, and inflammatory etiologies are considered.  -Small to moderate volume of ascites.    Cxray: 3/7/19  -Interstitial edema and bilateral effusions are unchanged. Right apical pleural thickening is stable. Right upper lobe focal area of scarring or atelectasis also stable.    PROTEIN CALORIE MALNUTRITION PRESENT: [ ] Yes [ ] No  [ ] PPSV2 < or = to 30% [ ] significant weight loss  [x ] poor nutritional intake [ ] catabolic state [ ] anasarca     Albumin, Serum: 2.7 g/dL (03-08-19 @ 07:16)  Artificial Nutrition [ ]     REFERRALS:   [ ]Chaplaincy  [ ] Hospice  [ ]Child Life  [ ]Social Work  [ ]Case management [ ]Holistic Therapy   Goals of Care Discussion Document: HPI: 89F with PMH of Alzheimer's, HTN, AF ( not on AC), CHF (last documented EF from 2015 of 62%), asthma, cholecystectomy who presented to ED with progressive lethargy and dyspnea over a 3-4 day period, and worsening LE edema. Found to have hypercapnic respiratory failure and intubated; also underwent thoracentesis (3/6) for L sided effusion, likely related to HFrEF exacerbation +/- PNA. Was extubated, transitioned to BiPAP and transferred to medicine. Failed S&S evaluation 3/8. Per family, patient has been declining slowly over the past few months, with less ambulation and less PO intake. Palliative was consulted to help discuss overall GOC.     PERTINENT PM/SXH:   Arthritis  Asthma  Diabetes  Hypertension  Alzheimer's dementia  No pertinent past medical history    H/O abdominal surgery    FAMILY HISTORY:  No pertinent family history in first degree relatives    ITEMS NOT CHECKED ARE NOT PRESENT    SOCIAL HISTORY:   Significant other/partner:  [ ]    Children:  [X ]    Yazidism/Spirituality:  Substance hx:  [ ]   Tobacco hx:  [ ]   Alcohol hx: [ ] Drug use hx: [ ] cannot assess from patient  Home Opioid hx:  [ ] (I-Stop Reference No: )  Living Situation: [ ]Home  [ ]Long term care  [ ]Rehab [ ]Other  Occupation:    ADVANCE DIRECTIVES:    DNR [ X]  MOLST  [ X]    Living Will  [ ]   DECISION MAKER(s):  [ ] Health Care Proxy(s)  [X ] Surrogate(s)  [ ] Guardian           Name(s) and Contact(s):  Decision-maker: children (6 children) - Chantel Massey: [phone]  Archana (grand daughter) -  [phone]   Melissa (grand daughter) - [phone]    HOME MEDICATIONS:    REVIEW OF SYSTEMS:  unable to assess from patient    Dyspnea:                           [ ]Mild [ x]Moderate [ ]Severe  Anxiety:                             [ ]Mild [ ]Moderate [ ]Severe  Fatigue:                             [ ]Mild [x ]Moderate [ ]Severe  Nausea:                             [ ]Mild [ ]Moderate [ ]Severe  Loss of appetite:              [ ]Mild [ ]Moderate [ ]Severe  Constipation:                    [ ]Mild [ ]Moderate [ ]Severe    Other Symptoms:  [ ]All other review of systems negative     Karnofsky Performance Score/Palliative Performance Status Version 2:    20  %    http://palliative.info/resource_material/[email or web address].pdf  PHYSICAL EXAM:  Vital Signs Last 24 Hrs  T(C): 36.6 (08 Mar 2019 09:10), Max: 37.4 (08 Mar 2019 00:19)  T(F): 97.9 (08 Mar 2019 09:10), Max: 99.3 (08 Mar 2019 00:19)  HR: 80 (08 Mar 2019 11:41) (60 - 100)  BP: 154/60 (08 Mar 2019 09:10) (118/62 - 160/56)  BP(mean): --  RR: 20 (08 Mar 2019 09:10) (18 - 20)  SpO2: 91% (08 Mar 2019 11:41) (91% - 100%) I&O's Summary    07 Mar 2019 07:01  -  08 Mar 2019 07:00  --------------------------------------------------------  IN: 1140 mL / OUT: 45 mL / NET: 1095 mL    08 Mar 2019 07:01  -  08 Mar 2019 12:52  --------------------------------------------------------  IN: 180 mL / OUT: 75 mL / NET: 105 mL    GENERAL:  [ ]Alert  [ ]Oriented  [ ]Lethargic  [ ]Cachexia  [ ]Unarousable  [ ]Verbal  [x ]Non-Verbal but could be verbal  Behavioral:   [ ] Anxiety  [ ] Delirium [ ] Agitation [ ] Other  HEENT:  [ x]Normal   [ ]Dry mouth   [ ]ET Tube/Trach  [ ]Oral lesions  PULMONARY:   [ ]Clear [ ]Tachypnea  [ ]Audible excessive secretions   [x ]Rhonchi        [ ]Right [ ]Left [x ]Bilateral  [ ]Crackles        [ ]Right [ ]Left [ ]Bilateral  [ ]Wheezing     [ ]Right [ ]Left [ ]Bilateral  CARDIOVASCULAR:    [x ]Regular [ ]Irregular [ ]Tachy  [ ]Alfonso [ ]Murmur [ ]Other  GASTROINTESTINAL:  [ ]Soft  [ ]Distended   [ ]+BS  [ ]Non tender [ ]Tender  [ ]PEG [ ]OGT/ NGT  Last BM: 3/7  GENITOURINARY:  [ ]Normal [x ] Incontinent   [ ]Oliguria/Anuria   [ ]Sawant  MUSCULOSKELETAL:   [ ]Normal   [ x]Weakness  [ x]Bed/Wheelchair bound [ x]Edema: 1+ LLE  NEUROLOGIC:   [ ]No focal deficits  [x ] Cognitive impairment  [ ] Dysphagia [ ]Dysarthria [ ] Paresis [ ]Other   SKIN:   [x ]Normal   [ ]Pressure ulcer(s)  [ ]Rash    CRITICAL CARE:  [ ] Shock Present  [ ]Septic [ ]Cardiogenic [ ]Neurologic [ ]Hypovolemic  [ ]  Vasopressors [ ]  Inotropes   [x ] Respiratory failure present  [x ] Acute  [ ] Chronic [ ] Hypoxic  [ ] Hypercarbic [ ] Other  [ ] Other organ failure     LABS:                        9.5    5.39  )-----------( 168      ( 08 Mar 2019 10:06 )             30.7   03-08    144  |  100  |  31<H>  ----------------------------<  142<H>  3.7   |  32<H>  |  0.91    Ca    9.4      08 Mar 2019 07:16  Phos  3.3     03-08  Mg     2.1     03-08    TPro  5.8<L>  /  Alb  2.7<L>  /  TBili  0.7  /  DBili  x   /  AST  34  /  ALT  20  /  AlkPhos  75  03-08  PT/INR - ( 08 Mar 2019 10:02 )   PT: 12.0 sec;   INR: 1.04 ratio    PTT - ( 08 Mar 2019 10:02 )  PTT:35.6 sec      RADIOLOGY & ADDITIONAL STUDIES:  CT Abdomen: 3/7/19  Bilateral pleural effusions, left greater than right. Parenchymal consolidation in the right lower lobe has increased since 3/4/2019, and there are central areas of cavitation, suspicious for   necrotizing pneumonia.  -Diffuse colitis involving the descending colon. Infectious, ischemic, and inflammatory etiologies are considered.  -Small to moderate volume of ascites.    CXR: 3/7/19  -Interstitial edema and bilateral effusions are unchanged. Right apical pleural thickening is stable. Right upper lobe focal area of scarring or atelectasis also stable.    PROTEIN CALORIE MALNUTRITION PRESENT: [ ] Yes [ ] No  [ ] PPSV2 < or = to 30% [ ] significant weight loss  [x ] poor nutritional intake [ ] catabolic state [ ] anasarca     Albumin, Serum: 2.7 g/dL (03-08-19 @ 07:16)  Artificial Nutrition [ ]     REFERRALS:   [ ]Chaplaincy  [ ] Hospice  [ ]Child Life  [ ]Social Work  [ ]Case management [ ]Holistic Therapy   Goals of Care Discussion Document:

## 2019-03-08 NOTE — CONSULT NOTE ADULT - ASSESSMENT
*** incomplete  - Continue Duonebs Q6H  - Start Hypertonic salin nebs TID (following the duonebs)  - Please ask respiratory to provide patient with a cough assist device.   - F/u laura cytopathology    Benita Heller MD  Pulmonary & Critical Care Medicine Fellow | PGY-4  [phone] 89 yr old female with PMHx of alzhiemer, HTN, Afib, diastolic CHF (EF 3/6/19 of 75%), Asthma (doesn't follow with pulmonologist), cholecystectomy who presented to ED on 3/4/19 with 3-4 days of progressive SOB, lethargy and cough. In the ED she was intubated and admitted to the MICU for hypoxic respiratory failure, septic shock/PNA. Improved diuresis, antibiotics and thoracentesis of left sided pleural effusion. CT abdomen from 3/7/19 with bilateral pleural effusions and when compared to CT form 3/4/19, read by radiology as suspicious for necrotizing PNA. Pulmonary consulted for assistance with managing necrotizing PNA.     Reviewed imaging with CHEST radiologist, what is interpreted as "gas" are contiguous dilated airways in the background of known bronchiectasis. This is unlikely to be a necrotizing PNA, but do recommend longer course of antibiotics given degree of lung involvement Concern that she is chronically aspirating and at risk for this to continue to happen given her age, recent illness, bed bound status and progressive Alzheimer's dementia. Family met with palliative care, she is now DNR/DNI.     - Longer course of abx recommend 10-12 days of abx)  TO help mobilize her secretions:   - Continue Duonebs Q6H  - Start Hypertonic saline nebs TID (following the duonebs)  - Please ask respiratory to provide patient with a cough assist device and a percussive vest.   - F/u thora cytopathology  - Sit her up in a chair for as long as she can tolerate during the day  - appreciated palliative care involvement with her care      Benita Heller MD  Pulmonary & Critical Care Medicine Fellow | PGY-4  [phone] 89 yr old female with PMHx of alzhiemer, HTN, Afib, diastolic CHF (EF 3/6/19 of 75%), Asthma (doesn't follow with pulmonologist), cholecystectomy who presented to ED on 3/4/19 with 3-4 days of progressive SOB, lethargy and cough. In the ED she was intubated and admitted to the MICU for hypoxic respiratory failure, septic shock/PNA. Improved diuresis, antibiotics and thoracentesis of left sided pleural effusion. CT abdomen from 3/7/19 with bilateral pleural effusions and when compared to CT form 3/4/19, read by radiology as suspicious for necrotizing PNA. Pulmonary consulted for assistance with managing necrotizing PNA.     Reviewed imaging with CHEST radiologist, what is interpreted as "gas" are contiguous dilated airways in the background of known bronchiectasis. This is unlikely to be a necrotizing PNA, but do recommend longer course of antibiotics given degree of lung involvement. With regards to pulmonary toilet, please see recommendations below:    - Recommend she receive a longer course of abx (10-12 days of abx)  - TO help mobilize her secretions:   	- Continue Duonebs Q6H              - Please ask respiratory to provide patient with a cough assist device and a percussive vest.   	- Sit her up in a chair for as long as she can tolerate during the day  - F/u laura cytopathology  - Pulmonary will continue to follow.     Benita Heller MD  Pulmonary & Critical Care Medicine Fellow | PGY-4  [phone]

## 2019-03-08 NOTE — PROGRESS NOTE ADULT - PROBLEM SELECTOR PLAN 4
-VANIA on CKD-3.   Improved,  Prerenal etiology  Now on LR D5 30 cc/hr   Awaiting official bedside dysphagia   Speech and Swallow  -Avoid nephrotoxic meds. -VANIA on CKD-3. -VANIA now resolved.   -C/w LR D5 30cc/hr for now since NPO. Caution with fluids in view of history of CHF.   -Avoid nephrotoxic meds. Renally dose meds.   -Monitor BMP.

## 2019-03-08 NOTE — PROGRESS NOTE ADULT - ASSESSMENT
89 year old female with PMH of Alzheimer's dementia, HTN, Afib; presented with hypercapnic respiratory failure secondary to pleural effusion s/p thoracentesis. 89 year old female with PMH of Alzheimer's dementia, HTN, Afib; presented with hypercapnic respiratory failure secondary to pleural effusion s/p intubation/extubation and thoracentesis in MICU. Course complicated by encephalopathy and multi-organism bacteremia. Found to have RLL necrotizing PNA and colitis on CT abdomen.

## 2019-03-08 NOTE — PROGRESS NOTE ADULT - ASSESSMENT
89F with Alzheimer dementia, Afib, CHF and Asthma admitted 3/4/19 with hypoxic and hypercapnic respiratory failure, was intubated and admitted to MICU, managed for possible CHF and/or pneumonia. s/p thoracentesis for a left effusion 3/6/19, slightly exudative. Extubated and downgraded from MICU.   Blood cultures from 3/4/19 have grown Enterococcus faecalis and alpha Strep from one of four bottles. BioFire detected CoNS as well but it has not grown so far - check with micro and they will check again. If a true polymicrobial bacteremia would be concerned for a GI focus. Repeat cultures negative.   BAL and pleural fluid without bacterial growth    Afebrile and nontoxic but had an episode of hypothermia to 94.9F 3/5 night.  CT with colitis most likely source of enterococcus bacteremia  CT chest with cavitation suspicious for necrotizing PNA    Recommend  -Continue Zosyn   -F/U pulm input regarding CT chest findings of cavitation and necrotizing PNA with BAL and thoracentesis with no growth  -Appreciate palliative input regarding GOC  -Anticipate a 2-week course of antibiotics for the enterococcus in blood cultures    prognosis guarded    Discussed with primary team

## 2019-03-08 NOTE — INPATIENT CERTIFICATION FOR MEDICARE PATIENTS - CURRENT MEDICAL NEEDS AND CARE PLANS
"                                             Progress Note    Client Name: Sola Silverman  Date: 11/26/18         Service Type: Individual      Session Start Time: 5:00 pm  Session End Time: 5:50 pm      Session Length: 50 min     Session #: 12     Attendees: Client attended alone    Treatment Plan Last Reviewed: 8/2/18 (Review by 11/2/18)  PHQ-9 / HERMINIO-7 :---  DATA      Progress Since Last Session (Related to Symptoms / Goals / Homework):   Symptoms: No change      Homework: Achieved / completed to satisfaction       Episode of Care Goals: Satisfactory progress - ACTION (Actively working towards change); Intervened by reinforcing change plan / affirming steps taken     Current / Ongoing Stressors and Concerns:   Ongoing: Work stress, grief around loss of father and disconnection from siblings, limited support system   Current: Utilized session to discuss progress in grief and symptom management in the workplace. Discussed experience of losing father, and continued questions and guilt that she feels she could have done something to prevent father from passing away. Client indicates fear that sister may have changed father's doctor to \"get rid of him\", which intensifies guilt that client should have done more when he began hospice care.     Treatment Objective(s) Addressed in This Session:   Client will increase understanding of steps in the grief process.   Client will identify steps toward managing grief.       Intervention:   CBT: Discussed CBT techniques/steps for treatment of complex grief. Offered psychoeducation on grieving. Assisted with processing beliefs about father's death, and identifying distorted cognitions that influence guilt.        ASSESSMENT: Current Emotional / Mental Status (status of significant symptoms):   Risk status (Self / Other harm or suicidal ideation)   Client denies current fears or concerns for personal safety.   Client denies current or recent suicidal ideation or " behaviors.   Client denies current or recent homicidal ideation or behaviors.   Client denies current or recent self injurious behavior or ideation.   Client denies other safety concerns.   Client Client reports there has been no change in risk factors since their last session.     Client Client reports there has been no change in protective factors since their last session.     A safety and risk management plan has not been developed at this time, however client was given the after-hours number / 911 should there be a change in any of these risk factors.     Appearance:   Appropriate    Eye Contact:   Good    Psychomotor Behavior: Normal    Attitude:   Cooperative    Orientation:   All   Speech    Rate / Production: Normal     Volume:  Normal    Mood:    Normal   Affect:    Appropriate    Thought Content:  Clear    Thought Form:  Coherent  Logical    Insight:    Good      Medication Review:   No current psychiatric medications prescribed     Medication Compliance:   NA     Changes in Health Issues:   None reported     Chemical Use Review:   Substance Use: Chemical use reviewed, no active concerns identified      Tobacco Use: No current tobacco use.       Collateral Reports Completed:   Not Applicable    PLAN: (Client Tasks / Therapist Tasks / Other)  Client is assigned to bring in photos of dad for next session.        Mi Curiel MA, [facility]                                                        ________________________________________________________________________    Treatment Plan    Client's Name: Sola Silverman  YOB: 1965    Date: 8/2/18    Diagnoses:            Adjustment Disorders  309.28 (F43.23) With mixed anxiety and depressed mood  Psychosocial & Contextual Factors: Death of father, estrangement from sister, limited support network  WHODAS: 23    Referral / Collaboration:  Referral to another professional/service is not indicated at this time..    Anticipated number of session or  this episode of care: 30      MeasurableTreatment Goal(s) related to diagnosis / functional impairment(s)  Goal 1: Client will increase understanding and engagement in the grief process.    Objective #A (Client Action)    Client will increase understanding of steps in the grief process.  Status: New - Date: 8/2/18     Intervention(s)  Therapist will assign homework    provide educational materials on grief process.    Objective #B  Client will develop vocabulary to describe feelings of grief and loss.  Status: New - Date: 8/2/18     Intervention(s)  Therapist will assign homework    teach emotional recognition/identification.      Objective #C  Client will identify steps toward managing grief.  Status: New - Date: 8/2/18     Intervention(s)  Therapist will assign homework    teach Mindfulness, Distress Tolerance, and Emotion Regulation skills.      Goal 2: Client will decrease anxiety symptoms, and increase engaging in stress management techniques as evidence by HERMINIO-7 scores.    Objective #A (Client Action)    Status: New - Date: 8/2/18     Client will engage in a physical activity 3 times weekly for 15 minutes.    Intervention(s)  Therapist will assign homework    teach emotional regulation skills.      Objective #B  Client will use at least 3 coping skills for anxiety management in the next 3 weeks.    Status: New - Date: 8/2/18     Intervention(s)  Therapist will assign homework    teach Distress Tolerance techniques.    Objective #C  Client will identify 3 initial signs or symptoms of anxiety.  Status: New - Date: 8/2/18     Intervention(s)  Therapist will assign homework    teach emotional recognition/identification.           Client has reviewed and agreed to the above plan.      Mi Curiel [facility] CenterSUME[facility], MA 11/26/18   Possible Skilled Nursing Facilty (SNF)

## 2019-03-08 NOTE — PROGRESS NOTE ADULT - ATTENDING COMMENTS
Subhash Grewal MD  Division of [facility] Medicine  Cell: [phone]  Pager: [phone]  Office: [phone]

## 2019-03-08 NOTE — PROGRESS NOTE ADULT - SUBJECTIVE AND OBJECTIVE BOX
Patient is a 89y old  Female who presents with a chief complaint of hypercapnic resp failure (08 Mar 2019 13:22)    -Patient's granddaughter translates, per patient/family request.     SUBJECTIVE / OVERNIGHT EVENTS: Patient denies pain or SOB. Patient is sleepy. Somewhat confused, per family.       MEDICATIONS  (STANDING):  ALBUTerol/ipratropium for Nebulization 3 milliLiter(s) Nebulizer every 6 hours  aspirin  chewable 81 milliGRAM(s) Oral daily  dextrose 5% + lactated ringers. 1000 milliLiter(s) (30 mL/Hr) IV Continuous <Continuous>  dextrose 5%. 1000 milliLiter(s) (50 mL/Hr) IV Continuous <Continuous>  dextrose 50% Injectable 12.5 Gram(s) IV Push once  dextrose 50% Injectable 25 Gram(s) IV Push once  dextrose 50% Injectable 25 Gram(s) IV Push once  heparin  Injectable 5000 Unit(s) SubCutaneous every 12 hours  insulin lispro (HumaLOG) corrective regimen sliding scale   SubCutaneous every 6 hours  metoprolol tartrate Injectable 2.5 milliGRAM(s) IV Push every 6 hours  piperacillin/tazobactam IVPB. 3.375 Gram(s) IV Intermittent every 12 hours  polyethylene glycol 3350 17 Gram(s) Oral daily  senna Syrup 10 milliLiter(s) Oral at bedtime  sodium chloride 3%  Inhalation 5 milliLiter(s) Inhalation every 6 hours    MEDICATIONS  (PRN):  ALBUTerol    90 MICROgram(s) HFA Inhaler 2 Puff(s) Inhalation every 12 hours PRN asthma  dextrose 40% Gel 15 Gram(s) Oral once PRN Blood Glucose LESS THAN 70 milliGRAM(s)/deciliter  glucagon  Injectable 1 milliGRAM(s) IntraMuscular once PRN Glucose LESS THAN 70 milligrams/deciliter      Vital Signs Last 24 Hrs  T(C): 36.9 (08 Mar 2019 14:23), Max: 37.4 (08 Mar 2019 00:19)  T(F): 98.4 (08 Mar 2019 14:23), Max: 99.3 (08 Mar 2019 00:19)  HR: 76 (08 Mar 2019 14:23) (60 - 89)  BP: 152/68 (08 Mar 2019 14:23) (132/55 - 160/56)  BP(mean): --  RR: 20 (08 Mar 2019 14:23) (18 - 20)  SpO2: 99% (08 Mar 2019 14:23) (91% - 100%)  CAPILLARY BLOOD GLUCOSE      POCT Blood Glucose.: 154 mg/dL (08 Mar 2019 12:11)  POCT Blood Glucose.: 134 mg/dL (08 Mar 2019 05:29)  POCT Blood Glucose.: 139 mg/dL (07 Mar 2019 23:15)  POCT Blood Glucose.: 168 mg/dL (07 Mar 2019 17:32)    I&O's Summary    07 Mar 2019 07:01  -  08 Mar 2019 07:00  --------------------------------------------------------  IN: 1140 mL / OUT: 45 mL / NET: 1095 mL    08 Mar 2019 07:01  -  08 Mar 2019 15:51  --------------------------------------------------------  IN: 180 mL / OUT: 75 mL / NET: 105 mL          PHYSICAL EXAM:   GENERAL: NAD, thin  HEAD:  Atraumatic, Normocephalic  EYES: Conjunctiva and sclera pale.   NECK: Supple  CHEST/LUNG: Coarse/congestion breath sounds with rhonchi bilaterally.   HEART: S1S2 normal. Irregular rate and rhythm; systolic murmur.   ABDOMEN: Soft, Nontender, Nondistended; Bowel sounds present  EXTREMITIES: No LE edema  PSYCH/Neuro: Very sleepy but opens eyes briefly and follows basic commands. Answers basic questions, but somewhat confused per granddaughter. Moves extremities.   SKIN: No rashes or lesions      LABS:                        9.5    5.39  )-----------( 168      ( 08 Mar 2019 10:06 )             30.7     03-08    144  |  100  |  31<H>  ----------------------------<  142<H>  3.7   |  32<H>  |  0.91    Ca    9.4      08 Mar 2019 07:16  Phos  3.3     03-08  Mg     2.1     03-08    TPro  5.8<L>  /  Alb  2.7<L>  /  TBili  0.7  /  DBili  x   /  AST  34  /  ALT  20  /  AlkPhos  75  03-08    PT/INR - ( 08 Mar 2019 10:02 )   PT: 12.0 sec;   INR: 1.04 ratio         PTT - ( 08 Mar 2019 10:02 )  PTT:35.6 sec      < from: CT Abdomen and Pelvis w/ IV Cont (03.07.19 @ 21:14) >  IMPRESSION: Bilateral pleural effusions, left greater than right.   Parenchymal consolidation in the right lower lobe has increased since   3/4/2019, and there are central areas of cavitation, suspicious for   necrotizing pneumonia.    Diffuse colitis involving the descending colon. Infectious, ischemic, and   inflammatory etiologies are considered.    Small to moderate volume of ascites.    Cystic lesions in the pancreas, increased in size.    < end of copied text >      RADIOLOGY & ADDITIONAL TESTS:    Imaging Personally Reviewed:  CT abd/pelvis report.   Consultant(s) Notes Reviewed:  Palliative, swallow, pulmonary  Care Discussed with Consultants/Other Providers: Palliative team, ID attending Patient is a 89y old  Female who presents with a chief complaint of hypercapnic resp failure (08 Mar 2019 13:22)    -Patient's granddaughter translates, per patient/family request.     SUBJECTIVE / OVERNIGHT EVENTS: Patient denies pain or SOB. Patient is sleepy. Somewhat confused, per family.       MEDICATIONS  (STANDING):  ALBUTerol/ipratropium for Nebulization 3 milliLiter(s) Nebulizer every 6 hours  aspirin  chewable 81 milliGRAM(s) Oral daily  dextrose 5% + lactated ringers. 1000 milliLiter(s) (30 mL/Hr) IV Continuous <Continuous>  dextrose 5%. 1000 milliLiter(s) (50 mL/Hr) IV Continuous <Continuous>  dextrose 50% Injectable 12.5 Gram(s) IV Push once  dextrose 50% Injectable 25 Gram(s) IV Push once  dextrose 50% Injectable 25 Gram(s) IV Push once  heparin  Injectable 5000 Unit(s) SubCutaneous every 12 hours  insulin lispro (HumaLOG) corrective regimen sliding scale   SubCutaneous every 6 hours  metoprolol tartrate Injectable 2.5 milliGRAM(s) IV Push every 6 hours  piperacillin/tazobactam IVPB. 3.375 Gram(s) IV Intermittent every 12 hours  polyethylene glycol 3350 17 Gram(s) Oral daily  senna Syrup 10 milliLiter(s) Oral at bedtime  sodium chloride 3%  Inhalation 5 milliLiter(s) Inhalation every 6 hours    MEDICATIONS  (PRN):  ALBUTerol    90 MICROgram(s) HFA Inhaler 2 Puff(s) Inhalation every 12 hours PRN asthma  dextrose 40% Gel 15 Gram(s) Oral once PRN Blood Glucose LESS THAN 70 milliGRAM(s)/deciliter  glucagon  Injectable 1 milliGRAM(s) IntraMuscular once PRN Glucose LESS THAN 70 milligrams/deciliter      Vital Signs Last 24 Hrs  T(C): 36.9 (08 Mar 2019 14:23), Max: 37.4 (08 Mar 2019 00:19)  T(F): 98.4 (08 Mar 2019 14:23), Max: 99.3 (08 Mar 2019 00:19)  HR: 76 (08 Mar 2019 14:23) (60 - 89)  BP: 152/68 (08 Mar 2019 14:23) (132/55 - 160/56)  BP(mean): --  RR: 20 (08 Mar 2019 14:23) (18 - 20)  SpO2: 99% (08 Mar 2019 14:23) (91% - 100%)  CAPILLARY BLOOD GLUCOSE      POCT Blood Glucose.: 154 mg/dL (08 Mar 2019 12:11)  POCT Blood Glucose.: 134 mg/dL (08 Mar 2019 05:29)  POCT Blood Glucose.: 139 mg/dL (07 Mar 2019 23:15)  POCT Blood Glucose.: 168 mg/dL (07 Mar 2019 17:32)    I&O's Summary    07 Mar 2019 07:01  -  08 Mar 2019 07:00  --------------------------------------------------------  IN: 1140 mL / OUT: 45 mL / NET: 1095 mL    08 Mar 2019 07:01  -  08 Mar 2019 15:51  --------------------------------------------------------  IN: 180 mL / OUT: 75 mL / NET: 105 mL          PHYSICAL EXAM:   GENERAL: NAD, thin  HEAD:  Atraumatic, Normocephalic  EYES: Conjunctiva and sclera pale.   NECK: Supple  CHEST/LUNG: Coarse/congestion breath sounds with rhonchi bilaterally.   HEART: S1S2 normal. Irregular rate and rhythm; systolic murmur.   ABDOMEN: Soft, Nontender, Nondistended; Bowel sounds present  EXTREMITIES: No LE edema  PSYCH/Neuro: Very sleepy but opens eyes briefly and follows basic commands. Answers basic questions, but somewhat confused per granddaughter. Moves extremities.   SKIN: No rashes or lesions      LABS:                        9.5    5.39  )-----------( 168      ( 08 Mar 2019 10:06 )             30.7     03-08    144  |  100  |  31<H>  ----------------------------<  142<H>  3.7   |  32<H>  |  0.91    Ca    9.4      08 Mar 2019 07:16  Phos  3.3     03-08  Mg     2.1     03-08    TPro  5.8<L>  /  Alb  2.7<L>  /  TBili  0.7  /  DBili  x   /  AST  34  /  ALT  20  /  AlkPhos  75  03-08    PT/INR - ( 08 Mar 2019 10:02 )   PT: 12.0 sec;   INR: 1.04 ratio         PTT - ( 08 Mar 2019 10:02 )  PTT:35.6 sec      < from: CT Abdomen and Pelvis w/ IV Cont (03.07.19 @ 21:14) >  IMPRESSION: Bilateral pleural effusions, left greater than right.   Parenchymal consolidation in the right lower lobe has increased since   3/4/2019, and there are central areas of cavitation, suspicious for   necrotizing pneumonia.    Diffuse colitis involving the descending colon. Infectious, ischemic, and   inflammatory etiologies are considered.    Small to moderate volume of ascites.    Cystic lesions in the pancreas, increased in size.    < end of copied text >      < from: Xray Chest 1 View- PORTABLE-Urgent (03.07.19 @ 19:25) >  Impression:    Interstitial edema and bilateral effusions are unchanged. Right apical   pleural thickening is stable. Right upper lobe focal area of scarring or   atelectasis also stable.    < end of copied text >      RADIOLOGY & ADDITIONAL TESTS:    Imaging Personally Reviewed:  CT abd/pelvis report. CXR report.   Consultant(s) Notes Reviewed:  Palliative, swallow, pulmonary, ID  Care Discussed with Consultants/Other Providers: Palliative team, ID attending Patient is a 89y old  Female who presents with a chief complaint of hypercapnic resp failure (08 Mar 2019 13:22)    -Patient's granddaughter translates, per patient/family request.     SUBJECTIVE / OVERNIGHT EVENTS: Patient denies pain or SOB. Patient is sleepy. Somewhat confused, per family.       MEDICATIONS  (STANDING):  ALBUTerol/ipratropium for Nebulization 3 milliLiter(s) Nebulizer every 6 hours  aspirin  chewable 81 milliGRAM(s) Oral daily  dextrose 5% + lactated ringers. 1000 milliLiter(s) (30 mL/Hr) IV Continuous <Continuous>  dextrose 5%. 1000 milliLiter(s) (50 mL/Hr) IV Continuous <Continuous>  dextrose 50% Injectable 12.5 Gram(s) IV Push once  dextrose 50% Injectable 25 Gram(s) IV Push once  dextrose 50% Injectable 25 Gram(s) IV Push once  heparin  Injectable 5000 Unit(s) SubCutaneous every 12 hours  insulin lispro (HumaLOG) corrective regimen sliding scale   SubCutaneous every 6 hours  metoprolol tartrate Injectable 2.5 milliGRAM(s) IV Push every 6 hours  piperacillin/tazobactam IVPB. 3.375 Gram(s) IV Intermittent every 12 hours  polyethylene glycol 3350 17 Gram(s) Oral daily  senna Syrup 10 milliLiter(s) Oral at bedtime  sodium chloride 3%  Inhalation 5 milliLiter(s) Inhalation every 6 hours    MEDICATIONS  (PRN):  ALBUTerol    90 MICROgram(s) HFA Inhaler 2 Puff(s) Inhalation every 12 hours PRN asthma  dextrose 40% Gel 15 Gram(s) Oral once PRN Blood Glucose LESS THAN 70 milliGRAM(s)/deciliter  glucagon  Injectable 1 milliGRAM(s) IntraMuscular once PRN Glucose LESS THAN 70 milligrams/deciliter      Vital Signs Last 24 Hrs  T(C): 36.9 (08 Mar 2019 14:23), Max: 37.4 (08 Mar 2019 00:19)  T(F): 98.4 (08 Mar 2019 14:23), Max: 99.3 (08 Mar 2019 00:19)  HR: 76 (08 Mar 2019 14:23) (60 - 89)  BP: 152/68 (08 Mar 2019 14:23) (132/55 - 160/56)  BP(mean): --  RR: 20 (08 Mar 2019 14:23) (18 - 20)  SpO2: 99% (08 Mar 2019 14:23) (91% - 100%)  CAPILLARY BLOOD GLUCOSE      POCT Blood Glucose.: 154 mg/dL (08 Mar 2019 12:11)  POCT Blood Glucose.: 134 mg/dL (08 Mar 2019 05:29)  POCT Blood Glucose.: 139 mg/dL (07 Mar 2019 23:15)  POCT Blood Glucose.: 168 mg/dL (07 Mar 2019 17:32)    I&O's Summary    07 Mar 2019 07:01  -  08 Mar 2019 07:00  --------------------------------------------------------  IN: 1140 mL / OUT: 45 mL / NET: 1095 mL    08 Mar 2019 07:01  -  08 Mar 2019 15:51  --------------------------------------------------------  IN: 180 mL / OUT: 75 mL / NET: 105 mL          PHYSICAL EXAM:   GENERAL: NAD, thin  HEAD:  Atraumatic, Normocephalic, ?oral thrush.   EYES: Conjunctiva and sclera pale.   NECK: Supple  CHEST/LUNG: Coarse/congestion breath sounds with rhonchi bilaterally.   HEART: S1S2 normal. Irregular rate and rhythm; systolic murmur.   ABDOMEN: Soft, Nontender, Nondistended; Bowel sounds present  EXTREMITIES: No LE edema  PSYCH/Neuro: Very sleepy but opens eyes briefly and follows basic commands. Answers basic questions, but somewhat confused per granddaughter. Moves extremities.   SKIN: No rashes or lesions      LABS:                        9.5    5.39  )-----------( 168      ( 08 Mar 2019 10:06 )             30.7     03-08    144  |  100  |  31<H>  ----------------------------<  142<H>  3.7   |  32<H>  |  0.91    Ca    9.4      08 Mar 2019 07:16  Phos  3.3     03-08  Mg     2.1     03-08    TPro  5.8<L>  /  Alb  2.7<L>  /  TBili  0.7  /  DBili  x   /  AST  34  /  ALT  20  /  AlkPhos  75  03-08    PT/INR - ( 08 Mar 2019 10:02 )   PT: 12.0 sec;   INR: 1.04 ratio         PTT - ( 08 Mar 2019 10:02 )  PTT:35.6 sec      < from: CT Abdomen and Pelvis w/ IV Cont (03.07.19 @ 21:14) >  IMPRESSION: Bilateral pleural effusions, left greater than right.   Parenchymal consolidation in the right lower lobe has increased since   3/4/2019, and there are central areas of cavitation, suspicious for   necrotizing pneumonia.    Diffuse colitis involving the descending colon. Infectious, ischemic, and   inflammatory etiologies are considered.    Small to moderate volume of ascites.    Cystic lesions in the pancreas, increased in size.    < end of copied text >      < from: Xray Chest 1 View- PORTABLE-Urgent (03.07.19 @ 19:25) >  Impression:    Interstitial edema and bilateral effusions are unchanged. Right apical   pleural thickening is stable. Right upper lobe focal area of scarring or   atelectasis also stable.    < end of copied text >      RADIOLOGY & ADDITIONAL TESTS:    Imaging Personally Reviewed:  CT abd/pelvis report. CXR report.   Consultant(s) Notes Reviewed:  Palliative, swallow, pulmonary, ID  Care Discussed with Consultants/Other Providers: Palliative team, ID attending

## 2019-03-08 NOTE — CONSULT NOTE ADULT - PROBLEM SELECTOR RECOMMENDATION 2
-Blood cx coagulase negative staph, enterococcus, strep species   -Vanco and Azithromycin D/C  -Continue zosyn  -Repeat cultures cleared  -Continue to follow ID Reccs.

## 2019-03-08 NOTE — CONSULT NOTE ADULT - PROBLEM SELECTOR RECOMMENDATION 9
-Pt Presented with progressive SOB and lethargy   -s/p extubation in MICU and thoracentesis.  Etiology of respiratory failure likely multifactorial including infection vs CHF vs pleural effusion L>R  -Now improved on nasal cannula.

## 2019-03-08 NOTE — CONSULT NOTE ADULT - PROBLEM SELECTOR RECOMMENDATION 8
palliative team spoke with family at Bedside, Daughter: Chantel Massey: [phone]), Grand daughter: Maryam: ( who just arrived from [facility] 2 dyas ago) [phone], and Aunt.   -patient has 6 children who are all involved in her care.  -Lives at home with daughter.  -No HCP established  -MOLST completed today, signed copy in chart and original handed to family palliative team spoke with family at Bedside, Daughter: Chantel Massey: [phone]), Grand daughter: Maryam: ( who just arrived from [facility] 2 dyas ago) [phone], and Aunt.   -patient has 6 children who are all involved in her care.  -Lives at home with daughter.  -No HCP, cannot establish at this point due to dementia  -MOLST completed today, signed copy in chart and original handed to family  - > 16 minutes spent on discussion leading to MOLST completion

## 2019-03-08 NOTE — CONSULT NOTE ADULT - ASSESSMENT
89 y/o Female with a PMHx of Alzheimer's dementia , HTN, Atrial fib(Not on AC) presents with hypercapnic respiratory failure secondary to pleural effusion L>R,S/P Intubation (3/4) and Extubation (3/6), S/P Thoracocentesis (3/6) currently being treated for Necrotizing Pneumonia/Possible CHF/Bacteremia. 89F with PMH of Alzheimer's, HTN, AF ( not on AC), CHF (last documented EF from 2015 of 62%), asthma, cholecystectomy who presented to ED with progressive lethargy and dyspnea over a 3-4 day period, and worsening LE edema. Found to have hypercapnic respiratory failure and intubated; also underwent thoracentesis (3/6) for L sided effusion, likely related to HFrEF exacerbation +/- PNA. Was extubated, transitioned to BiPAP and transferred to medicine. Failed S&S evaluation 3/8. Per family, patient has been declining slowly over the past few months, with less ambulation and less PO intake. Palliative was consulted to help discuss overall GOC.

## 2019-03-08 NOTE — PROGRESS NOTE ADULT - SUBJECTIVE AND OBJECTIVE BOX
CC: Patient is a 89y old  Female who presents with a chief complaint of hypercapnic resp failure (08 Mar 2019 13:22)    ID following for bacteremia    Interval History/ROS: Patient lethargic, slowly responsive to questions. No fevers. WBC WNL.    Rest of ROS negative.    Allergies  No Known Allergies    ANTIMICROBIALS:  piperacillin/tazobactam IVPB. 3.375 every 12 hours    OTHER MEDS:  ALBUTerol    90 MICROgram(s) HFA Inhaler 2 Puff(s) Inhalation every 12 hours PRN  ALBUTerol/ipratropium for Nebulization 3 milliLiter(s) Nebulizer every 6 hours  aspirin  chewable 81 milliGRAM(s) Oral daily  dextrose 40% Gel 15 Gram(s) Oral once PRN  dextrose 5% + lactated ringers. 1000 milliLiter(s) IV Continuous <Continuous>  dextrose 5%. 1000 milliLiter(s) IV Continuous <Continuous>  dextrose 50% Injectable 12.5 Gram(s) IV Push once  dextrose 50% Injectable 25 Gram(s) IV Push once  dextrose 50% Injectable 25 Gram(s) IV Push once  glucagon  Injectable 1 milliGRAM(s) IntraMuscular once PRN  heparin  Injectable 5000 Unit(s) SubCutaneous every 12 hours  insulin lispro (HumaLOG) corrective regimen sliding scale   SubCutaneous every 6 hours  metoprolol tartrate Injectable 2.5 milliGRAM(s) IV Push every 6 hours  polyethylene glycol 3350 17 Gram(s) Oral daily  senna Syrup 10 milliLiter(s) Oral at bedtime  sodium chloride 3%  Inhalation 5 milliLiter(s) Inhalation every 6 hours    PE:    Vital Signs Last 24 Hrs  T(C): 36.9 (08 Mar 2019 14:23), Max: 37.4 (08 Mar 2019 00:19)  T(F): 98.4 (08 Mar 2019 14:23), Max: 99.3 (08 Mar 2019 00:19)  HR: 76 (08 Mar 2019 14:23) (60 - 89)  BP: 152/68 (08 Mar 2019 14:23) (132/55 - 160/56)  BP(mean): --  RR: 20 (08 Mar 2019 14:23) (18 - 20)  SpO2: 99% (08 Mar 2019 14:23) (91% - 100%)    Gen: Lethargic, NAD  CV: S1+S2 normal, no murmurs  Resp: Decreased breath sounds on the right side  Abd: Soft, nontender, +BS  Ext: No LE edema, no wounds  : No Sawant  IV/Skin: No thrombophlebitis  Neuro: slowly responds to questions    LABS:                          9.5    5.39  )-----------( 168      ( 08 Mar 2019 10:06 )             30.7       03-08    144  |  100  |  31<H>  ----------------------------<  142<H>  3.7   |  32<H>  |  0.91    Ca    9.4      08 Mar 2019 07:16  Phos  3.3     03-08  Mg     2.1     03-08    TPro  5.8<L>  /  Alb  2.7<L>  /  TBili  0.7  /  DBili  x   /  AST  34  /  ALT  20  /  AlkPhos  75  03-08          MICROBIOLOGY:  v  .Body Fluid Pleural Fluid  03-06-19   No growth  --    No polymorphonuclear cells seen  No organisms seen  by cytocentrifuge      .Blood Blood  03-06-19   No growth to date.  --  --      .Sputum Sputum  03-05-19   Culture is being performed.  --  --      Bronch Wash Combicath  03-04-19   Normal Respiratory Anna present  --    Numerous polymorphonuclear leukocytes per low power field  Rare Squamous Epithelial Cells per low power field  Few Gram Variable Rods per oil power field  Rare Gram positive cocci in pairs per oil power field      .Urine Catheterized  03-04-19   No growth  --  --      .Blood Blood-Peripheral  03-04-19   Growth in aerobic bottle: Enterococcus faecalis  "Due to technical problems, Proteus sp. will Not be reported as part of  the BCID panel until further notice"  ***Blood Panel PCR results on this specimen are available  approximately 3 hours after the Gram stain result.***  Gram stain, PCR, and/or culture results may not always  correspond due to difference in methodologies.  ************************************************************  This PCR assay was performed using [employer].  The following targets are tested for: Enterococcus,  vancomycin resistant enterococci, Listeria monocytogenes,  coagulase negative staphylococci, S. aureus,  methicillin resistant S. aureus, Streptococcus agalactiae  (Group B), S. pneumoniae, S. pyogenes (Group A),  Acinetobacter baumannii, Enterobacter cloacae, E. coli,  Klebsiella oxytoca, K. pneumoniae, Proteus sp.,  Serratia marcescens, Haemophilus influenzae,  Neisseria meningitidis, Pseudomonas aeruginosa, Candida  albicans, C. glabrata, C krusei, C parapsilosis,  C. tropicalis and the KPC resistance gene.  --  Blood Culture PCR  Enterococcus faecalis    Rapid RVP Result: NotDetec (03-04 @ 12:31)    RADIOLOGY:    < from: CT Abdomen and Pelvis w/ IV Cont (03.07.19 @ 21:14) >  IMPRESSION: Bilateral pleural effusions, left greater than right.   Parenchymal consolidation in the right lower lobe has increased since   3/4/2019, and there are central areas of cavitation, suspicious for   necrotizing pneumonia.    Diffuse colitis involving the descending colon. Infectious, ischemic, and   inflammatory etiologies are considered.    Small to moderate volume of ascites.    Cystic lesions in the pancreas, increased in size.      < end of copied text >

## 2019-03-08 NOTE — PROGRESS NOTE ADULT - PROBLEM SELECTOR PLAN 2
-Not on AC at home due to age and fall risk.    -C/w ASA when able to take PO.   -C/w IV metoprolol for now while NPO. -Not on AC at home due to age and fall risk.    -C/w ASA when able to take PO.   -C/w IV metoprolol for now while NPO. Will increase to 5mg IV Q6H in view of borderline elevated BP.   -Was on digoxin at home, held here due to elevated level on admission. Level eventually came down. Consider resuming if rate not controlled.

## 2019-03-08 NOTE — SWALLOW BEDSIDE ASSESSMENT ADULT - SWALLOW EVAL: PATIENT/FAMILY GOALS STATEMENT
Per family report pt has had decreased PO intake and weight loss over the past year.  Denied coughing during PO intake, however stated they "chop/mush" food prior to feeding patient.

## 2019-03-08 NOTE — SWALLOW BEDSIDE ASSESSMENT ADULT - PHARYNGEAL PHASE
Delayed pharyngeal swallow/Decreased laryngeal elevation Delayed pharyngeal swallow/Decreased laryngeal elevation/weak cough post oral intake

## 2019-03-08 NOTE — CONSULT NOTE ADULT - PROBLEM SELECTOR RECOMMENDATION 6
Improved,  Prerenal etiology  Now on LR D5 30 cc/hr   Awaiting official bedside dysphagia   Speech and Swallow. Improved,  Prerenal etiology  Now on LR D5 30 cc/hr

## 2019-03-08 NOTE — SWALLOW BEDSIDE ASSESSMENT ADULT - SLP GENERAL OBSERVATIONS
Patient encountered in bed, eyes closed, intermittently opening one eye, responding to verbal stimuli/questions, oriented to self only, +2L/NC.  Family at bedside and opted to provide translation; stated pt responds inappropriately to questions at time.  Endorsed that she is confused at baseline, however slightly worse at this time.  Inconsistently followed commands for evaluation purposes.  Vocal quality judged to be weak with decreased vocal intensity. +Cachetic appearance. +Weak volitonal cough.

## 2019-03-08 NOTE — CONSULT NOTE ADULT - PROBLEM SELECTOR RECOMMENDATION 7
-Palliative consulted for Goals of care  -MOLST completed and signed, Original document handed to the family at bedside  ( Daughter: Chantel Massey: [phone]) and copy in chart.  -No HCP Document  -Possibility of NG feeding tube?? -Palliative consulted for Goals of care  -MOLST completed and signed, Original document handed to the family at bedside  ( Daughter: Chantel Massey: [phone]) and copy in chart.  -No HCP signed  -Family does not want NGT at this time, and would like to observe through the weekend

## 2019-03-08 NOTE — PROGRESS NOTE ADULT - PROBLEM SELECTOR PLAN 3
-Exudative based on lights criteria.  Culture with no growth but after antibiotics had already been initiated.  Respiratory status is improved after tap.  Repeat chest xray today to evaluate -Exudative based on Light's criteria, possibly due to necrotizing PNA seen on CT. Culture with no growth but after antibiotics had already been initiated. Respiratory status is improved after tap.  -CXR showed Interstitial edema and bilateral effusions are unchanged. Right apical   pleural thickening is stable. Right upper lobe focal area of scarring or atelectasis also stable.  -History of chronic diastolic CHF. Caution with diuresis in view of patient being NPO.   -F/u fluid cytopathology.  -Monitor strict I's/O's and daily weights.

## 2019-03-08 NOTE — CHART NOTE - NSCHARTNOTEFT_GEN_A_CORE
Nutrition Follow Up Note      Source: medical record, medical team, pt's daughter at bedside    Diet : NPO    Patient seen for verbal nutrition consult for tube feeding recommendations. Medical chart reviewed/events noted. Per chart, pt found to be in hypercapnic respiratory failure, was intubated (3/4), found to have a Left sided pleural effusion, is s/p a thoracentesis 3/6 (withdrew 750cc) of most likely transudative fluid 2/2 CHF exacerbation vs / and PNA. The pt was successfully extubated (3/6) to a BIPAP. Pt nonverbal unable to obtain subjective information. Pt was previously receiving Glucerna 1.2 @ 40mlhr x 18 hours in MICU (3/5-3/6). Pt currently NPO, plan for speech and swallow evaluation today. If unable to tolerate PO diet and within pt/family GOC, plan for enteral nutrition per NP.  Recommendations noted below.       Daily Weight in k.1 (-), Weight in k.7 (), Weight in k.8 (), Weight in k.5 (), Weight in k.5 ()- weight loss noted- per pt's daughter pt's UBW 90 pounds (41Kg), weight fluctuations likely 2/2 scale discrepancy       Pertinent Medications: MEDICATIONS  (STANDING):  ALBUTerol/ipratropium for Nebulization 3 milliLiter(s) Nebulizer every 6 hours  aspirin  chewable 81 milliGRAM(s) Oral daily  dextrose 5% + lactated ringers. 1000 milliLiter(s) (30 mL/Hr) IV Continuous <Continuous>  dextrose 5%. 1000 milliLiter(s) (50 mL/Hr) IV Continuous <Continuous>  dextrose 50% Injectable 12.5 Gram(s) IV Push once  dextrose 50% Injectable 25 Gram(s) IV Push once  dextrose 50% Injectable 25 Gram(s) IV Push once  heparin  Injectable 5000 Unit(s) SubCutaneous every 12 hours  insulin lispro (HumaLOG) corrective regimen sliding scale   SubCutaneous every 6 hours  metoprolol tartrate Injectable 2.5 milliGRAM(s) IV Push every 6 hours  piperacillin/tazobactam IVPB. 3.375 Gram(s) IV Intermittent every 12 hours  polyethylene glycol 3350 17 Gram(s) Oral daily  senna Syrup 10 milliLiter(s) Oral at bedtime    MEDICATIONS  (PRN):  ALBUTerol    90 MICROgram(s) HFA Inhaler 2 Puff(s) Inhalation every 12 hours PRN asthma  dextrose 40% Gel 15 Gram(s) Oral once PRN Blood Glucose LESS THAN 70 milliGRAM(s)/deciliter  glucagon  Injectable 1 milliGRAM(s) IntraMuscular once PRN Glucose LESS THAN 70 milligrams/deciliter    Pertinent Labs:  @ 07:16: Na 144, BUN 31<H>, Cr 0.91, <H>, K+ 3.7, Phos 3.3, Mg 2.1, Alk Phos 75, ALT/SGPT 20, AST/SGOT 34, HbA1c --    Finger Sticks:  POCT Blood Glucose.: 154 mg/dL ( @ 12:11)  POCT Blood Glucose.: 134 mg/dL ( @ 05:29)  POCT Blood Glucose.: 139 mg/dL ( @ 23:15)  POCT Blood Glucose.: 168 mg/dL ( @ 17:32)  No edema. No pressure ulcers documented.     Estimated Needs:   [X ] no change since previous assessment  [ ] recalculated:     Previous Nutrition Diagnosis: N/A   Nutrition Diagnosis is:    New Nutrition Diagnosis:           Recommend  1) If enteral nutrition warranted recommend Glucerna 1.2 @ 65ml/hr x 14 hours to provide 1092kcal, 55gm protein (27kcal/Kg, 1.3gm protein/Kg UBW 41Kg). Pt to be on 14 hour feeds per discussion with NP while pt on BiPAP to allow for 8 hours of BIPAP and two hour break after stopping enteral nutrition to begin BIPAP. If pt no longer in need of BIPAP, recommend continuous feeds of Glucerna 1.2 @ 40ml/hr x 24 hours (1152kcal, 58gm protein)    2) If plan for PO diet, recommend regular diet-texture/consistency per team.     Monitoring and Evaluation:     Continue to monitor Nutritional intake, Tolerance to diet prescription, weights, labs, skin integrity    RD remains available upon request and will follow up per protocol Nutrition Follow Up Note      Source: medical record, medical team, pt's daughter at bedside    Diet : NPO    Patient seen for verbal nutrition consult for tube feeding recommendations. Medical chart reviewed/events noted. Per chart, pt found to be in hypercapnic respiratory failure, was intubated (3/4), found to have a Left sided pleural effusion, is s/p a thoracentesis 3/6 (withdrew 750cc) of most likely transudative fluid 2/2 CHF exacerbation vs / and PNA. The pt was successfully extubated (3/6) to a BIPAP. Pt nonverbal unable to obtain subjective information. Pt was previously receiving Glucerna 1.2 @ 40mlhr x 18 hours in MICU (3/5-3/6). Pt currently NPO, plan for speech and swallow evaluation today. If unable to tolerate PO diet and within pt/family GOC, plan for enteral nutrition per NP.  Recommendations noted below.       Daily Weight in k.1 (-), Weight in k.7 (), Weight in k.8 (), Weight in k.5 (), Weight in k.5 ()- weight loss noted- per pt's daughter pt's UBW 90 pounds (41Kg), weight fluctuations likely 2/2 scale discrepancy       Pertinent Medications: MEDICATIONS  (STANDING):  ALBUTerol/ipratropium for Nebulization 3 milliLiter(s) Nebulizer every 6 hours  aspirin  chewable 81 milliGRAM(s) Oral daily  dextrose 5% + lactated ringers. 1000 milliLiter(s) (30 mL/Hr) IV Continuous <Continuous>  dextrose 5%. 1000 milliLiter(s) (50 mL/Hr) IV Continuous <Continuous>  dextrose 50% Injectable 12.5 Gram(s) IV Push once  dextrose 50% Injectable 25 Gram(s) IV Push once  dextrose 50% Injectable 25 Gram(s) IV Push once  heparin  Injectable 5000 Unit(s) SubCutaneous every 12 hours  insulin lispro (HumaLOG) corrective regimen sliding scale   SubCutaneous every 6 hours  metoprolol tartrate Injectable 2.5 milliGRAM(s) IV Push every 6 hours  piperacillin/tazobactam IVPB. 3.375 Gram(s) IV Intermittent every 12 hours  polyethylene glycol 3350 17 Gram(s) Oral daily  senna Syrup 10 milliLiter(s) Oral at bedtime    MEDICATIONS  (PRN):  ALBUTerol    90 MICROgram(s) HFA Inhaler 2 Puff(s) Inhalation every 12 hours PRN asthma  dextrose 40% Gel 15 Gram(s) Oral once PRN Blood Glucose LESS THAN 70 milliGRAM(s)/deciliter  glucagon  Injectable 1 milliGRAM(s) IntraMuscular once PRN Glucose LESS THAN 70 milligrams/deciliter    Pertinent Labs:  @ 07:16: Na 144, BUN 31<H>, Cr 0.91, <H>, K+ 3.7, Phos 3.3, Mg 2.1, Alk Phos 75, ALT/SGPT 20, AST/SGOT 34, HbA1c --    Finger Sticks:  POCT Blood Glucose.: 154 mg/dL ( @ 12:11)  POCT Blood Glucose.: 134 mg/dL ( @ 05:29)  POCT Blood Glucose.: 139 mg/dL ( @ 23:15)  POCT Blood Glucose.: 168 mg/dL ( @ 17:32)  No edema. No pressure ulcers documented.     Estimated Needs:   [] no change since previous assessment  [X ] recalculated: As pt extubated and using pt's UBW (per daughter), 41Kg    Energy:25-30kcal/Kg= 1025-1230kcal  Protein: 1.2-1.4= 49-57.4gm    Previous Nutrition Diagnosis: N/A   Nutrition Diagnosis is:    New Nutrition Diagnosis:           Recommend  1) If enteral nutrition warranted recommend Glucerna 1.2 @ 65ml/hr x 14 hours to provide 1092kcal, 55gm protein (27kcal/Kg, 1.3gm protein/Kg UBW 41Kg). Pt to be on 14 hour feeds per discussion with NP while pt on BiPAP to allow for 8 hours of BIPAP and two hour break after stopping enteral nutrition to begin BIPAP. If pt no longer in need of BIPAP, recommend continuous feeds of Glucerna 1.2 @ 40ml/hr x 24 hours (1152kcal, 58gm protein)    2) If plan for PO diet, recommend regular diet-texture/consistency per team.     Monitoring and Evaluation:     Continue to monitor Nutritional intake, Tolerance to diet prescription, weights, labs, skin integrity    RD remains available upon request and will follow up per protocol

## 2019-03-08 NOTE — CONSULT NOTE ADULT - PROBLEM SELECTOR RECOMMENDATION 5
S/P thoracocentesis. Exudative based on lights criteria.  Culture with no growth but after antibiotics had already been initiated.  Respiratory status is improved after tap.

## 2019-03-08 NOTE — SWALLOW BEDSIDE ASSESSMENT ADULT - SWALLOW EVAL: DIAGNOSIS
Patient presents with an 1) oral and suspected pharyngeal dysphagia characterized by impaired oral management and s/s suggestive of laryngeal penetration/aspiration with most conservative PO textures. 2) cognitive linguistic deficits.

## 2019-03-08 NOTE — CONSULT NOTE ADULT - ATTENDING COMMENTS
Patient seen and examined, data reviewed and imaging personally reviewed by me.  Current abdominal Ct shows dense consolidation in the RLL with areas of air interpreted as necrotizing pneumonia.  From prior CT chest performed during this admission she has chronic bronchiectatic and cystic changes in the right lung, likely sequelae of a prior infection.  Would continue antibioitcs for 10 -14 day course.  Agree with plan as outlined above.  Would get out of bed and work on airway clearance techniques as outlined above.

## 2019-03-08 NOTE — SWALLOW BEDSIDE ASSESSMENT ADULT - ADDITIONAL RECOMMENDATIONS
Maintain good oral hygiene. Maintain good oral hygiene.  This service to tentatively f/u for reevaluation on Monday, 3/11.

## 2019-03-08 NOTE — PROGRESS NOTE ADULT - PROBLEM SELECTOR PLAN 1
-Presented with progressive SOB and lethargy with VBG 7.21 CO2- 104 on admission.   -S/p extubation in MICU and thoracentesis. Etiology of respiratory failure likely multifactorial including PNA, CHF, and pleural effusion.  -Now improved on nasal cannula and Bipap at bedtime.  -F/u pulmonary eval. -Presented with progressive SOB and lethargy with VBG 7.21 CO2- 104 on admission, CO2 subsequently improved after a period of intubation.   -S/p extubation in MICU and thoracentesis. Etiology of respiratory failure likely multifactorial including PNA, CHF, and pleural effusion.  -CT abdomen shows RLL areas of cavitation, concerning for necrotizing PNA.   -C/w Zosyn for now, per ID recs.   -Now improved on nasal cannula and Bipap at bedtime.  -F/u pulmonary eval.  -C/w Duonebs Q6H.  -Started NaCl inhalation Q6H for now, per pulmonary recs.  -Airway clearance device and Chest PT and aspiration precautions and elevate HOB. -Presented with progressive SOB and lethargy with VBG 7.21 CO2- 104 on admission, CO2 subsequently improved after a period of intubation.   -S/p extubation in MICU and thoracentesis. Etiology of respiratory failure likely multifactorial including PNA, CHF, and pleural effusion.  -CT abdomen shows RLL areas of cavitation, concerning for necrotizing PNA.   -C/w Zosyn for now, per ID recs.   -Now improved on nasal cannula and Bipap at bedtime.  -F/u pulmonary eval.  -C/w Duonebs Q6H.  -Airway clearance device, Chest Vest, and Chest PT, and aspiration precautions and elevate HOB. OOB to chair.

## 2019-03-08 NOTE — PROGRESS NOTE ADULT - PROBLEM SELECTOR PLAN 5
-Patient unable to participate with swallow eval due to poor participation. Family reportedly would like to work with Speech to have a repeat swallow eval Monday if patient's mental status is improved. -Reportedly they would like to hold off on NGT until that time, but can readdress NGT daily.   -Nutrition eval appreciated. -Patient unable to participate with swallow eval due to poor participation. Family reportedly would like to work with Speech to have a repeat swallow eval Monday if patient's mental status is improved. -Reportedly they would like to hold off on NGT until that time, but can readdress NGT daily.   -Nutrition eval appreciated.  -C/w ELVER Q6H while NPO.

## 2019-03-08 NOTE — PROGRESS NOTE ADULT - PROBLEM SELECTOR PLAN 6
-Found to have Coagulase negative Staph, Enterococcus faecalis, Strep species   -C/w Zosyn for now, per ID recs.   Repeat cultures cleared  -Echo pending. -Found to have Coagulase negative Staph, Enterococcus faecalis, Strep species on earlier cultures. Blood cultures subsequently cleared.   -CT abdomen shows colitis, which likely explains the multi-organism bacteremia.   -C/w Zosyn for now, per ID recs.   -Echo without any obvious vegetations.  -Consider treatment for ?oral thrush seen on exam.

## 2019-03-08 NOTE — PROGRESS NOTE ADULT - PROBLEM SELECTOR PLAN 7
-Heparin SQ for DVT PPx.  -PT eval. -Patient also with some acute encephalopathy on chronic dementia. Likely multifactorial due to hospitalization and infection, etc.   -Monitor mental status closely. If patient becomes acutely more somnolent, check ABG to look for CO2 retention.  -Fall precautions, general neuro checks.

## 2019-03-08 NOTE — CONSULT NOTE ADULT - SUBJECTIVE AND OBJECTIVE BOX
Pulmonary Consult Note:    CHIEF COMPLAINT:    HPI:  89 yr old female with PMHx of alzhiemer, HTN, Afib, CHF (last documented EF from 2015 of 62%), Asthma, cholecystectomy who presented to ED on 3/4/19 with 3-4 days of  progressive SOB with lethargy and 1 day of productive cough. Noted to have increased lower extremity edema with L>R, initially treated with lasix (changed to spironolactone the week prior to presentation). In the ED VBG of 7.21/104, with proBNP of 25886. Was started on lasix for diuresis, bipap and vanco/zosyn/Azithromax and intubated in the ED.     Admitted to the MICU for hypercapnic resp failure. Had a thoracentesis with 750cc serous fluid withdrawn, studies borderline exudative (T Protein 2.9, , serum protein 5.1). Extubated on 3/5/19. Has been on Vanc/Zosyn for pneumonia till 3/7, Vancomycin stopped on 3/7, now only on Zosyn.  Combicath Cultures from 3/4/19 with normal respiratory jayro, subsequent sputum and blood cultures NGTD. Has not had leukocytosis. has been afebrile the entire hospital stay. Urine legionella negative. CT chest form 3/7/19 with bilateral pleural effusions L>R, Worsening RLL consolidation (compared to CT form 3/4/19), suspicious for necrotizing PNA. Pulmonary consulted for further evaluation.     ECHO from 3/6/19  Minimal MR, mild AR  stage 1 diastolic dysfunction  Normal RV size and function.   RVSP 57, moderate pHTN.       PAST MEDICAL & SURGICAL HISTORY:  Arthritis  Asthma  Diabetes  Hypertension  Alzheimer's dementia  H/O abdominal surgery    FAMILY HISTORY:  No pertinent family history in first degree relatives    SOCIAL HISTORY:    Allergies  No Known Allergies      HOME MEDICATIONS:  Home Medications:  albuterol-ipratropium 2.5 mg-0.5 mg/3 mL inhalation solution: 3 milliliter(s) inhaled every 6 hours (07 Mar 2019 14:18)  Aldactone 25 mg oral tablet: 1 tab(s) orally once a day (07 Mar 2019 14:18)  Calcium 600+D 600 mg-200 intl units oral tablet:  orally once a day (07 Mar 2019 14:18)  Calcium 600+D oral tablet: 1 tab(s) orally 2 times a day (07 Mar 2019 14:18)  Digox 125 mcg (0.125 mg) oral tablet: 1 tab(s) orally once a day (07 Mar 2019 14:18)  donepezil 10 mg oral tablet: 1 tab(s) orally once a day (at bedtime) (07 Mar 2019 14:18)  furosemide 20 mg oral tablet: 1 tab(s) orally every other day (07 Mar 2019 14:18)  Januvia 50 mg oral tablet: 1 tab(s) orally once a day (07 Mar 2019 14:18)  Lasix 20 mg oral tablet: 1 tab(s) orally once a day, As Needed (07 Mar 2019 14:18)  metoprolol tartrate 25 mg oral tablet: 1 tab(s) orally 2 times a day (07 Mar 2019 14:18)  metoprolol tartrate 25 mg oral tablet: 1 tab(s) orally 2 times a day (07 Mar 2019 14:18)  Norvasc 2.5 mg oral tablet: 1 tab(s) orally once a day (07 Mar 2019 14:18)  pioglitazone 30 mg oral tablet: 1 tab(s) orally once a day (07 Mar 2019 14:18)  Zocor 10 mg oral tablet: 1 tab(s) orally once a day (at bedtime) (07 Mar 2019 14:18)    REVIEW OF SYSTEMS:  CONSTITUTIONAL: No fever, weight loss, or fatigue  EYES: No eye pain, visual disturbances, or discharge  ENMT:  No difficulty hearing, tinnitus, vertigo; No sinus or throat pain  NECK: No pain or stiffness  BREASTS: No pain, masses, or nipple discharge  RESPIRATORY: No cough, wheezing, chills or hemoptysis; No shortness of breath  CARDIOVASCULAR: No chest pain, palpitations, dizziness, or leg swelling  GASTROINTESTINAL: No abdominal or epigastric pain. No nausea, vomiting, or hematemesis; No diarrhea or constipation. No melena or hematochezia.  GENITOURINARY: No dysuria, frequency, hematuria, or incontinence  NEUROLOGICAL: No headaches, memory loss, loss of strength, numbness, or tremors  SKIN: No itching, burning, rashes, or lesions   LYMPH NODES: No enlarged glands  ENDOCRINE: No heat or cold intolerance; No hair loss  MUSCULOSKELETAL: No joint pain or swelling; No muscle, back, or extremity pain  PSYCHIATRIC: No depression, anxiety, mood swings, or difficulty sleeping  HEME/LYMPH: No easy bruising, or bleeding gums  ALLERY AND IMMUNOLOGIC: No hives or eczema    OBJECTIVE:  ICU Vital Signs Last 24 Hrs  T(C): 36.6 (08 Mar 2019 09:10), Max: 37.4 (08 Mar 2019 00:19)  T(F): 97.9 (08 Mar 2019 09:10), Max: 99.3 (08 Mar 2019 00:19)  HR: 80 (08 Mar 2019 11:41) (60 - 100)  BP: 154/60 (08 Mar 2019 09:10) (118/62 - 160/56)  RR: 20 (08 Mar 2019 09:10) (18 - 20)  SpO2: 91% (08 Mar 2019 11:41) (91% - 100%)      03-07 @ 07:01  -  03-08 @ 07:00  --------------------------------------------------------  IN: 1140 mL / OUT: 45 mL / NET: 1095 mL    03-08 @ 07:01  -  03-08 @ 13:24  --------------------------------------------------------  IN: 180 mL / OUT: 75 mL / NET: 105 mL    CAPILLARY BLOOD GLUCOSE  POCT Blood Glucose.: 154 mg/dL (08 Mar 2019 12:11)      PHYSICAL EXAM:  GENERAL: NAD, well-groomed, well-developed  HEAD:  Atraumatic, Normocephalic  EYES: EOMI, PERRLA, conjunctiva and sclera clear  ENMT: No tonsillar erythema, exudates, or enlargement; Moist mucous membranes, Good dentition, No lesions  NECK: Supple, No JVD, Normal thyroid  NERVOUS SYSTEM:  Alert & Oriented X3, Good concentration; Motor Strength 5/5 B/L upper and lower extremities; DTRs 2+ intact and symmetric  CHEST/LUNG: Clear to percussion bilaterally; No rales, rhonchi, wheezing, or rubs  HEART: Regular rate and rhythm; No murmurs, rubs, or gallops  ABDOMEN: Soft, Nontender, Nondistended; Bowel sounds present  EXTREMITIES:  2+ Peripheral Pulses, No clubbing, cyanosis, or edema  LYMPH: No lymphadenopathy noted  SKIN: No rashes or lesions    HOSPITAL MEDICATIONS:  Standing Meds:  ALBUTerol/ipratropium for Nebulization 3 milliLiter(s) Nebulizer every 6 hours  aspirin  chewable 81 milliGRAM(s) Oral daily  dextrose 5% + lactated ringers. 1000 milliLiter(s) IV Continuous <Continuous>  dextrose 5%. 1000 milliLiter(s) IV Continuous <Continuous>  dextrose 50% Injectable 12.5 Gram(s) IV Push once  dextrose 50% Injectable 25 Gram(s) IV Push once  dextrose 50% Injectable 25 Gram(s) IV Push once  heparin  Injectable 5000 Unit(s) SubCutaneous every 12 hours  insulin lispro (HumaLOG) corrective regimen sliding scale   SubCutaneous every 6 hours  metoprolol tartrate Injectable 2.5 milliGRAM(s) IV Push every 6 hours  piperacillin/tazobactam IVPB. 3.375 Gram(s) IV Intermittent every 12 hours  polyethylene glycol 3350 17 Gram(s) Oral daily  senna Syrup 10 milliLiter(s) Oral at bedtime    PRN Meds:  ALBUTerol    90 MICROgram(s) HFA Inhaler 2 Puff(s) Inhalation every 12 hours PRN  dextrose 40% Gel 15 Gram(s) Oral once PRN  glucagon  Injectable 1 milliGRAM(s) IntraMuscular once PRN    LABS:                        9.5    5.39  )-----------( 168      ( 08 Mar 2019 10:06 )             30.7     Hgb Trend: 9.5<--, 10.1<--, 11.6<--, 11.2<--, 10.1<--  03-08    144  |  100  |  31<H>  ----------------------------<  142<H>  3.7   |  32<H>  |  0.91    Ca    9.4      08 Mar 2019 07:16  Phos  3.3     03-08  Mg     2.1     03-08    TPro  5.8<L>  /  Alb  2.7<L>  /  TBili  0.7  /  DBili  x   /  AST  34  /  ALT  20  /  AlkPhos  75  03-08  Creatinine Trend: 0.91<--, 0.97<--, 1.29<--, 1.45<--, 1.52<--, 1.39<--  PT/INR - ( 08 Mar 2019 10:02 )   PT: 12.0 sec;   INR: 1.04 ratio    PTT - ( 08 Mar 2019 10:02 )  PTT:35.6 sec    Arterial Blood Gas:  03-07 @ 00:27  7.38/61/160/36/99/9.2  ABG lactate: --  Arterial Blood Gas:  03-06 @ 19:58  7.37/64/146/36/99/9.3  ABG lactate: --  Arterial Blood Gas:  03-06 @ 15:13  7.39/63/84/37/96/10.4  ABG lactate: --    MICROBIOLOGY:   Culture - Fungal, Body Fluid (collected 06 Mar 2019 05:14)  Source: .Body Fluid Pleural Fluid  Preliminary Report (06 Mar 2019 09:51):    Testing in progress    Culture - Body Fluid with Gram Stain (collected 06 Mar 2019 05:14)  Source: .Body Fluid Pleural Fluid  Gram Stain (06 Mar 2019 07:21):    No polymorphonuclear cells seen    No organisms seen    by cytocentrifuge  Preliminary Report (07 Mar 2019 07:21):    No growth    Culture - Acid Fast - Body Fluid w/Smear (collected 06 Mar 2019 05:14)  Source: .Body Fluid Pleural Fluid    Culture - Blood (collected 06 Mar 2019 02:50)  Source: .Blood Blood  Preliminary Report (07 Mar 2019 03:01):    No growth to date.    Culture - Blood (collected 06 Mar 2019 02:50)  Source: .Blood Blood  Preliminary Report (07 Mar 2019 03:01):    No growth to date.    RADIOLOGY:  [x] Reviewed and interpreted by me Pulmonary Consult Note:    CHIEF COMPLAINT:    HPI:  89 yr old female with PMHx of alzhiemer, HTN, Afib, diastolic CHF (EF 3/6/19 of 75%), Asthma (doesn't follow with pulmonologist), cholecystectomy who presented to ED on 3/4/19 with 3-4 days of  progressive SOB with lethargy and 1 day of productive cough. Noted to have increased lower extremity edema with L>R, initially treated with lasix (changed to spironolactone the week prior to presentation). In the ED VBG of 7.21/104, with proBNP of 56232. Was started on lasix for diuresis, bipap and vanco/zosyn/Azithromax and intubated in the ED.     Admitted to the MICU for hypercapnic resp failure. Had a thoracentesis with 750cc serous fluid withdrawn, studies borderline exudative (T Protein 2.9, , serum protein 5.1). Extubated on 3/5/19. Has been on Vanc/Zosyn for pneumonia till 3/7, Vancomycin stopped on 3/7, now only on Zosyn.  Combicath Cultures from 3/4/19 with normal respiratory jayro, subsequent sputum and blood cultures NGTD. Has not had leukocytosis. has been afebrile the entire hospital stay. Urine legionella negative. CT abdomen from 3/7/19 with bilateral pleural effusions L>R, Worsening RLL consolidation (compared to CT form 3/4/19), suspicious for necrotizing PNA. Pulmonary consulted for further evaluation.     ECHO from 3/6/19  Minimal MR, mild AR  stage 1 diastolic dysfunction  Normal RV size and function.   RVSP 57, moderate pHTN.     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    Allergies  No Known Allergies    HOME MEDICATIONS:  Home Medications:  albuterol-ipratropium 2.5 mg-0.5 mg/3 mL inhalation solution: 3 milliliter(s) inhaled every 6 hours (07 Mar 2019 14:18)  Aldactone 25 mg oral tablet: 1 tab(s) orally once a day (07 Mar 2019 14:18)  Calcium 600+D 600 mg-200 intl units oral tablet:  orally once a day (07 Mar 2019 14:18)  Calcium 600+D oral tablet: 1 tab(s) orally 2 times a day (07 Mar 2019 14:18)  Digox 125 mcg (0.125 mg) oral tablet: 1 tab(s) orally once a day (07 Mar 2019 14:18)  donepezil 10 mg oral tablet: 1 tab(s) orally once a day (at bedtime) (07 Mar 2019 14:18)  furosemide 20 mg oral tablet: 1 tab(s) orally every other day (07 Mar 2019 14:18)  Januvia 50 mg oral tablet: 1 tab(s) orally once a day (07 Mar 2019 14:18)  Lasix 20 mg oral tablet: 1 tab(s) orally once a day, As Needed (07 Mar 2019 14:18)  metoprolol tartrate 25 mg oral tablet: 1 tab(s) orally 2 times a day (07 Mar 2019 14:18)  metoprolol tartrate 25 mg oral tablet: 1 tab(s) orally 2 times a day (07 Mar 2019 14:18)  Norvasc 2.5 mg oral tablet: 1 tab(s) orally once a day (07 Mar 2019 14:18)  pioglitazone 30 mg oral tablet: 1 tab(s) orally once a day (07 Mar 2019 14:18)  Zocor 10 mg oral tablet: 1 tab(s) orally once a day (at bedtime) (07 Mar 2019 14:18)    REVIEW OF SYSTEMS:  CONSTITUTIONAL: No fever, weight loss, or fatigue  EYES: No eye pain, visual disturbances, or discharge  ENMT:  No difficulty hearing, tinnitus, vertigo; No sinus or throat pain  NECK: No pain or stiffness  BREASTS: No pain, masses, or nipple discharge  RESPIRATORY: No cough, wheezing, chills or hemoptysis; No shortness of breath  CARDIOVASCULAR: No chest pain, palpitations, dizziness, or leg swelling  GASTROINTESTINAL: No abdominal or epigastric pain. No nausea, vomiting, or hematemesis; No diarrhea or constipation. No melena or hematochezia.  GENITOURINARY: No dysuria, frequency, hematuria, or incontinence  NEUROLOGICAL: No headaches, memory loss, loss of strength, numbness, or tremors  SKIN: No itching, burning, rashes, or lesions   LYMPH NODES: No enlarged glands  ENDOCRINE: No heat or cold intolerance; No hair loss  MUSCULOSKELETAL: No joint pain or swelling; No muscle, back, or extremity pain  PSYCHIATRIC: No depression, anxiety, mood swings, or difficulty sleeping  HEME/LYMPH: No easy bruising, or bleeding gums  ALLERY AND IMMUNOLOGIC: No hives or eczema    OBJECTIVE:  ICU Vital Signs Last 24 Hrs  T(C): 36.6 (08 Mar 2019 09:10), Max: 37.4 (08 Mar 2019 00:19)  T(F): 97.9 (08 Mar 2019 09:10), Max: 99.3 (08 Mar 2019 00:19)  HR: 80 (08 Mar 2019 11:41) (60 - 100)  BP: 154/60 (08 Mar 2019 09:10) (118/62 - 160/56)  RR: 20 (08 Mar 2019 09:10) (18 - 20)  SpO2: 91% (08 Mar 2019 11:41) (91% - 100%)      03-07 @ 07:01  -  03-08 @ 07:00  --------------------------------------------------------  IN: 1140 mL / OUT: 45 mL / NET: 1095 mL    03-08 @ 07:01 - 03-08 @ 13:24  --------------------------------------------------------  IN: 180 mL / OUT: 75 mL / NET: 105 mL    CAPILLARY BLOOD GLUCOSE  POCT Blood Glucose.: 154 mg/dL (08 Mar 2019 12:11)      PHYSICAL EXAM:  GENERAL: NAD, well-groomed, well-developed  HEAD:  Atraumatic, Normocephalic  EYES: EOMI, PERRLA, conjunctiva and sclera clear  ENMT: No tonsillar erythema, exudates, or enlargement; Moist mucous membranes, Good dentition, No lesions  NECK: Supple, No JVD, Normal thyroid  NERVOUS SYSTEM:  Alert & Oriented X3, Good concentration; Motor Strength 5/5 B/L upper and lower extremities; DTRs 2+ intact and symmetric  CHEST/LUNG: Clear to percussion bilaterally; No rales, rhonchi, wheezing, or rubs  HEART: Regular rate and rhythm; No murmurs, rubs, or gallops  ABDOMEN: Soft, Nontender, Nondistended; Bowel sounds present  EXTREMITIES:  2+ Peripheral Pulses, No clubbing, cyanosis, or edema  LYMPH: No lymphadenopathy noted  SKIN: No rashes or lesions    HOSPITAL MEDICATIONS:  Standing Meds:  ALBUTerol/ipratropium for Nebulization 3 milliLiter(s) Nebulizer every 6 hours  aspirin  chewable 81 milliGRAM(s) Oral daily  dextrose 5% + lactated ringers. 1000 milliLiter(s) IV Continuous <Continuous>  dextrose 5%. 1000 milliLiter(s) IV Continuous <Continuous>  dextrose 50% Injectable 12.5 Gram(s) IV Push once  dextrose 50% Injectable 25 Gram(s) IV Push once  dextrose 50% Injectable 25 Gram(s) IV Push once  heparin  Injectable 5000 Unit(s) SubCutaneous every 12 hours  insulin lispro (HumaLOG) corrective regimen sliding scale   SubCutaneous every 6 hours  metoprolol tartrate Injectable 2.5 milliGRAM(s) IV Push every 6 hours  piperacillin/tazobactam IVPB. 3.375 Gram(s) IV Intermittent every 12 hours  polyethylene glycol 3350 17 Gram(s) Oral daily  senna Syrup 10 milliLiter(s) Oral at bedtime    PRN Meds:  ALBUTerol    90 MICROgram(s) HFA Inhaler 2 Puff(s) Inhalation every 12 hours PRN  dextrose 40% Gel 15 Gram(s) Oral once PRN  glucagon  Injectable 1 milliGRAM(s) IntraMuscular once PRN    LABS:                        9.5    5.39  )-----------( 168      ( 08 Mar 2019 10:06 )             30.7     Hgb Trend: 9.5<--, 10.1<--, 11.6<--, 11.2<--, 10.1<--  03-08    144  |  100  |  31<H>  ----------------------------<  142<H>  3.7   |  32<H>  |  0.91    Ca    9.4      08 Mar 2019 07:16  Phos  3.3     03-08  Mg     2.1     03-08    TPro  5.8<L>  /  Alb  2.7<L>  /  TBili  0.7  /  DBili  x   /  AST  34  /  ALT  20  /  AlkPhos  75  03-08  Creatinine Trend: 0.91<--, 0.97<--, 1.29<--, 1.45<--, 1.52<--, 1.39<--  PT/INR - ( 08 Mar 2019 10:02 )   PT: 12.0 sec;   INR: 1.04 ratio    PTT - ( 08 Mar 2019 10:02 )  PTT:35.6 sec    Arterial Blood Gas:  03-07 @ 00:27  7.38/61/160/36/99/9.2  ABG lactate: --  Arterial Blood Gas:  03-06 @ 19:58  7.37/64/146/36/99/9.3  ABG lactate: --  Arterial Blood Gas:  03-06 @ 15:13  7.39/63/84/37/96/10.4  ABG lactate: --    MICROBIOLOGY:   Culture - Fungal, Body Fluid (collected 06 Mar 2019 05:14)  Source: .Body Fluid Pleural Fluid  Preliminary Report (06 Mar 2019 09:51):    Testing in progress    Culture - Body Fluid with Gram Stain (collected 06 Mar 2019 05:14)  Source: .Body Fluid Pleural Fluid  Gram Stain (06 Mar 2019 07:21):    No polymorphonuclear cells seen    No organisms seen    by cytocentrifuge  Preliminary Report (07 Mar 2019 07:21):    No growth    Culture - Acid Fast - Body Fluid w/Smear (collected 06 Mar 2019 05:14)  Source: .Body Fluid Pleural Fluid    Culture - Blood (collected 06 Mar 2019 02:50)  Source: .Blood Blood  Preliminary Report (07 Mar 2019 03:01):    No growth to date.    Culture - Blood (collected 06 Mar 2019 02:50)  Source: .Blood Blood  Preliminary Report (07 Mar 2019 03:01):    No growth to date.    RADIOLOGY:  [x] Reviewed and interpreted by me Pulmonary Consult Note:    CHIEF COMPLAINT:    HPI:  89 yr old female with PMHx of alzhiemer, HTN, Afib, diastolic CHF (EF 3/6/19 of 75%), Asthma (doesn't follow with pulmonologist), cholecystectomy who presented to ED on 3/4/19 with 3-4 days of  progressive SOB with lethargy and 1 day of productive cough. Noted to have increased lower extremity edema with L>R, initially treated with lasix (changed to spironolactone the week prior to presentation). In the ED VBG of 7.21/104, with proBNP of 15892. Was started on lasix for diuresis, bipap and vanco/zosyn/Azithromax and intubated in the ED.     Admitted to the MICU for hypercapnic resp failure. Had a thoracentesis with 750cc serous fluid withdrawn, studies borderline exudative (T Protein 2.9, , serum protein 5.1). Extubated on 3/5/19. Has been on Vanc/Zosyn for pneumonia till 3/7, Vancomycin stopped on 3/7, now only on Zosyn.  Combicath Cultures from 3/4/19 with normal respiratory jayro, subsequent sputum and blood cultures NGTD. Has not had leukocytosis. has been afebrile the entire hospital stay. Urine legionella negative. CT abdomen from 3/7/19 with bilateral pleural effusions L>R, Worsening RLL consolidation (compared to CT form 3/4/19), read by radiology as suspicious for necrotizing PNA. Pulmonary consulted for further evaluation. On review of imaging with CHEST radiologist, what is interpreted as "gas" are contiguous dilated airways in the background of known bronchiectasis.     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    Allergies  No Known Allergies    HOME MEDICATIONS:  Home Medications:  albuterol-ipratropium 2.5 mg-0.5 mg/3 mL inhalation solution: 3 milliliter(s) inhaled every 6 hours (07 Mar 2019 14:18)  Aldactone 25 mg oral tablet: 1 tab(s) orally once a day (07 Mar 2019 14:18)  Calcium 600+D 600 mg-200 intl units oral tablet:  orally once a day (07 Mar 2019 14:18)  Calcium 600+D oral tablet: 1 tab(s) orally 2 times a day (07 Mar 2019 14:18)  Digox 125 mcg (0.125 mg) oral tablet: 1 tab(s) orally once a day (07 Mar 2019 14:18)  donepezil 10 mg oral tablet: 1 tab(s) orally once a day (at bedtime) (07 Mar 2019 14:18)  furosemide 20 mg oral tablet: 1 tab(s) orally every other day (07 Mar 2019 14:18)  Januvia 50 mg oral tablet: 1 tab(s) orally once a day (07 Mar 2019 14:18)  Lasix 20 mg oral tablet: 1 tab(s) orally once a day, As Needed (07 Mar 2019 14:18)  metoprolol tartrate 25 mg oral tablet: 1 tab(s) orally 2 times a day (07 Mar 2019 14:18)  metoprolol tartrate 25 mg oral tablet: 1 tab(s) orally 2 times a day (07 Mar 2019 14:18)  Norvasc 2.5 mg oral tablet: 1 tab(s) orally once a day (07 Mar 2019 14:18)  pioglitazone 30 mg oral tablet: 1 tab(s) orally once a day (07 Mar 2019 14:18)  Zocor 10 mg oral tablet: 1 tab(s) orally once a day (at bedtime) (07 Mar 2019 14:18)    REVIEW OF SYSTEMS:  Non-verbal, moaning in bed. not answering questions appropriately.     OBJECTIVE:  ICU Vital Signs Last 24 Hrs  T(C): 36.6 (08 Mar 2019 09:10), Max: 37.4 (08 Mar 2019 00:19)  T(F): 97.9 (08 Mar 2019 09:10), Max: 99.3 (08 Mar 2019 00:19)  HR: 80 (08 Mar 2019 11:41) (60 - 100)  BP: 154/60 (08 Mar 2019 09:10) (118/62 - 160/56)  RR: 20 (08 Mar 2019 09:10) (18 - 20)  SpO2: 91% (08 Mar 2019 11:41) (91% - 100%)    03-07 @ 07:01  -  03-08 @ 07:00  --------------------------------------------------------  IN: 1140 mL / OUT: 45 mL / NET: 1095 mL    03-08 @ 07:01  - 03-08 @ 13:24  --------------------------------------------------------  IN: 180 mL / OUT: 75 mL / NET: 105 mL    CAPILLARY BLOOD GLUCOSE  POCT Blood Glucose.: 154 mg/dL (08 Mar 2019 12:11)      PHYSICAL EXAM:  GENERAL: Hunched over, cachetic, moaning  EYES: EOMI,  NERVOUS SYSTEM:  non-verbal, moaning, moving extremities in a disorganized way.   CHEST/LUNG: Coarse breath sounds.   HEART: irregularly irregular, +murmurs,  ABDOMEN: Soft, Nontender, Nondistended;   EXTREMITIES:  2+ Peripheral Pulses, No edema  LYMPH: No lymphadenopathy noted  SKIN: No rashes or lesions    HOSPITAL MEDICATIONS:  Standing Meds:  ALBUTerol/ipratropium for Nebulization 3 milliLiter(s) Nebulizer every 6 hours  aspirin  chewable 81 milliGRAM(s) Oral daily  dextrose 5% + lactated ringers. 1000 milliLiter(s) IV Continuous <Continuous>  dextrose 5%. 1000 milliLiter(s) IV Continuous <Continuous>  dextrose 50% Injectable 12.5 Gram(s) IV Push once  dextrose 50% Injectable 25 Gram(s) IV Push once  dextrose 50% Injectable 25 Gram(s) IV Push once  heparin  Injectable 5000 Unit(s) SubCutaneous every 12 hours  insulin lispro (HumaLOG) corrective regimen sliding scale   SubCutaneous every 6 hours  metoprolol tartrate Injectable 2.5 milliGRAM(s) IV Push every 6 hours  piperacillin/tazobactam IVPB. 3.375 Gram(s) IV Intermittent every 12 hours  polyethylene glycol 3350 17 Gram(s) Oral daily  senna Syrup 10 milliLiter(s) Oral at bedtime    PRN Meds:  ALBUTerol    90 MICROgram(s) HFA Inhaler 2 Puff(s) Inhalation every 12 hours PRN  dextrose 40% Gel 15 Gram(s) Oral once PRN  glucagon  Injectable 1 milliGRAM(s) IntraMuscular once PRN    LABS:                        9.5    5.39  )-----------( 168      ( 08 Mar 2019 10:06 )             30.7     Hgb Trend: 9.5<--, 10.1<--, 11.6<--, 11.2<--, 10.1<--  03-08    144  |  100  |  31<H>  ----------------------------<  142<H>  3.7   |  32<H>  |  0.91    Ca    9.4      08 Mar 2019 07:16  Phos  3.3     03-08  Mg     2.1     03-08    TPro  5.8<L>  /  Alb  2.7<L>  /  TBili  0.7  /  DBili  x   /  AST  34  /  ALT  20  /  AlkPhos  75  03-08  Creatinine Trend: 0.91<--, 0.97<--, 1.29<--, 1.45<--, 1.52<--, 1.39<--  PT/INR - ( 08 Mar 2019 10:02 )   PT: 12.0 sec;   INR: 1.04 ratio    PTT - ( 08 Mar 2019 10:02 )  PTT:35.6 sec    Arterial Blood Gas:  03-07 @ 00:27  7.38/61/160/36/99/9.2  ABG lactate: --  Arterial Blood Gas:  03-06 @ 19:58  7.37/64/146/36/99/9.3  ABG lactate: --  Arterial Blood Gas:  03-06 @ 15:13  7.39/63/84/37/96/10.4  ABG lactate: --    MICROBIOLOGY:   Culture - Fungal, Body Fluid (collected 06 Mar 2019 05:14)  Source: .Body Fluid Pleural Fluid  Preliminary Report (06 Mar 2019 09:51):    Testing in progress    Culture - Body Fluid with Gram Stain (collected 06 Mar 2019 05:14)  Source: .Body Fluid Pleural Fluid  Gram Stain (06 Mar 2019 07:21):    No polymorphonuclear cells seen    No organisms seen    by cytocentrifuge  Preliminary Report (07 Mar 2019 07:21):    No growth    Culture - Acid Fast - Body Fluid w/Smear (collected 06 Mar 2019 05:14)  Source: .Body Fluid Pleural Fluid    Culture - Blood (collected 06 Mar 2019 02:50)  Source: .Blood Blood  Preliminary Report (07 Mar 2019 03:01):    No growth to date.    Culture - Blood (collected 06 Mar 2019 02:50)  Source: .Blood Blood  Preliminary Report (07 Mar 2019 03:01):    No growth to date.    RADIOLOGY:  [x] Reviewed and interpreted by me Pulmonary Consult Note:    CHIEF COMPLAINT: shortness of breath    HPI:  89 yr old female with PMHx of alzhiemer, HTN, Afib, diastolic CHF (EF 3/6/19 of 75%), Asthma (doesn't follow with pulmonologist), cholecystectomy who presented to ED on 3/4/19 with 3-4 days of  progressive SOB with lethargy and 1 day of productive cough. Noted to have increased lower extremity edema with L>R, initially treated with lasix (changed to spironolactone the week prior to presentation). In the ED VBG of 7.21/104, [place] proBNP of 9[address]. Was started on lasix for diuresis, bipap and vanco/zosyn/Azithromax and intubated in the ED.     Admitted to the MICU for hypercapnic resp failure. Had a thoracentesis with 750cc serous fluid withdrawn, studies borderline exudative (T Protein 2.9, , serum protein 5.1). Extubated on 3/5/19. Has been on Vanc/Zosyn for pneumonia till 3/7, Vancomycin stopped on 3/7, now only on Zosyn.  Combicath Cultures from 3/4/19 with normal respiratory jayro, subsequent sputum and blood cultures NGTD. Has not had leukocytosis. has been afebrile the entire hospital stay. Urine legionella negative. CT abdomen from 3/7/19 with bilateral pleural effusions L>R, Worsening RLL consolidation (compared to CT form 3/4/19), read by radiology as suspicious for necrotizing PNA. Pulmonary consulted for further evaluation. On review of imaging with CHEST radiologist, what is interpreted as "gas" are contiguous dilated airways in the background of known bronchiectasis.     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    Allergies  No Known Allergies    HOME MEDICATIONS:  Home Medications:  albuterol-ipratropium 2.5 mg-0.5 mg/3 mL inhalation solution: 3 milliliter(s) inhaled every 6 hours (07 Mar 2019 14:18)  Aldactone 25 mg oral tablet: 1 tab(s) orally once a day (07 Mar 2019 14:18)  Calcium 600+D 600 mg-200 intl units oral tablet:  orally once a day (07 Mar 2019 14:18)  Calcium 600+D oral tablet: 1 tab(s) orally 2 times a day (07 Mar 2019 14:18)  Digox 125 mcg (0.125 mg) oral tablet: 1 tab(s) orally once a day (07 Mar 2019 14:18)  donepezil 10 mg oral tablet: 1 tab(s) orally once a day (at bedtime) (07 Mar 2019 14:18)  furosemide 20 mg oral tablet: 1 tab(s) orally every other day (07 Mar 2019 14:18)  Januvia 50 mg oral tablet: 1 tab(s) orally once a day (07 Mar 2019 14:18)  Lasix 20 mg oral tablet: 1 tab(s) orally once a day, As Needed (07 Mar 2019 14:18)  metoprolol tartrate 25 mg oral tablet: 1 tab(s) orally 2 times a day (07 Mar 2019 14:18)  metoprolol tartrate 25 mg oral tablet: 1 tab(s) orally 2 times a day (07 Mar 2019 14:18)  Norvasc 2.5 mg oral tablet: 1 tab(s) orally once a day (07 Mar 2019 14:18)  pioglitazone 30 mg oral tablet: 1 tab(s) orally once a day (07 Mar 2019 14:18)  Zocor 10 mg oral tablet: 1 tab(s) orally once a day (at bedtime) (07 Mar 2019 14:18)    REVIEW OF SYSTEMS:  Non-verbal, moaning in bed. not answering questions appropriately.     OBJECTIVE:  ICU Vital Signs Last 24 Hrs  T(C): 36.6 (08 Mar 2019 09:10), Max: 37.4 (08 Mar 2019 00:19)  T(F): 97.9 (08 Mar 2019 09:10), Max: 99.3 (08 Mar 2019 00:19)  HR: 80 (08 Mar 2019 11:41) (60 - 100)  BP: 154/60 (08 Mar 2019 09:10) (118/62 - 160/56)  RR: 20 (08 Mar 2019 09:10) (18 - 20)  SpO2: 91% (08 Mar 2019 11:41) (91% - 100%)    03-07 @ 07:01  -  03-08 @ 07:00  --------------------------------------------------------  IN: 1140 mL / OUT: 45 mL / NET: 1095 mL    03-08 @ 07:01  - 03-08 @ 13:24  --------------------------------------------------------  IN: 180 mL / OUT: 75 mL / NET: 105 mL    CAPILLARY BLOOD GLUCOSE  POCT Blood Glucose.: 154 mg/dL (08 Mar 2019 12:11)      PHYSICAL EXAM:  GENERAL: Hunched over, cachetic, moaning  EYES: EOMI,  NERVOUS SYSTEM:  non-verbal, moaning, moving extremities in a disorganized way.   CHEST/LUNG: Coarse breath sounds.   HEART: irregularly irregular, +murmurs,  ABDOMEN: Soft, Nontender, Nondistended;   EXTREMITIES:  2+ Peripheral Pulses, No edema  LYMPH: No lymphadenopathy noted  SKIN: No rashes or lesions    HOSPITAL MEDICATIONS:  Standing Meds:  ALBUTerol/ipratropium for Nebulization 3 milliLiter(s) Nebulizer every 6 hours  aspirin  chewable 81 milliGRAM(s) Oral daily  dextrose 5% + lactated ringers. 1000 milliLiter(s) IV Continuous <Continuous>  dextrose 5%. 1000 milliLiter(s) IV Continuous <Continuous>  dextrose 50% Injectable 12.5 Gram(s) IV Push once  dextrose 50% Injectable 25 Gram(s) IV Push once  dextrose 50% Injectable 25 Gram(s) IV Push once  heparin  Injectable 5000 Unit(s) SubCutaneous every 12 hours  insulin lispro (HumaLOG) corrective regimen sliding scale   SubCutaneous every 6 hours  metoprolol tartrate Injectable 2.5 milliGRAM(s) IV Push every 6 hours  piperacillin/tazobactam IVPB. 3.375 Gram(s) IV Intermittent every 12 hours  polyethylene glycol 3350 17 Gram(s) Oral daily  senna Syrup 10 milliLiter(s) Oral at bedtime    PRN Meds:  ALBUTerol    90 MICROgram(s) HFA Inhaler 2 Puff(s) Inhalation every 12 hours PRN  dextrose 40% Gel 15 Gram(s) Oral once PRN  glucagon  Injectable 1 milliGRAM(s) IntraMuscular once PRN    LABS:                        9.5    5.39  )-----------( 168      ( 08 Mar 2019 10:06 )             30.7     Hgb Trend: 9.5<--, 10.1<--, 11.6<--, 11.2<--, 10.1<--  03-08    144  |  100  |  31<H>  ----------------------------<  142<H>  3.7   |  32<H>  |  0.91    Ca    9.4      08 Mar 2019 07:16  Phos  3.3     03-08  Mg     2.1     03-08    TPro  5.8<L>  /  Alb  2.7<L>  /  TBili  0.7  /  DBili  x   /  AST  34  /  ALT  20  /  AlkPhos  75  03-08  Creatinine Trend: 0.91<--, 0.97<--, 1.29<--, 1.45<--, 1.52<--, 1.39<--  PT/INR - ( 08 Mar 2019 10:02 )   PT: 12.0 sec;   INR: 1.04 ratio    PTT - ( 08 Mar 2019 10:02 )  PTT:35.6 sec    Arterial Blood Gas:  03-07 @ 00:27  7.38/61/160/36/99/9.2  ABG lactate: --  Arterial Blood Gas:  03-06 @ 19:58  7.37/64/146/36/99/9.3  ABG lactate: --  Arterial Blood Gas:  03-06 @ 15:13  7.39/63/84/37/96/10.4  ABG lactate: --    MICROBIOLOGY:   Culture - Fungal, Body Fluid (collected 06 Mar 2019 05:14)  Source: .Body Fluid Pleural Fluid  Preliminary Report (06 Mar 2019 09:51):    Testing in progress    Culture - Body Fluid with Gram Stain (collected 06 Mar 2019 05:14)  Source: .Body Fluid Pleural Fluid  Gram Stain (06 Mar 2019 07:21):    No polymorphonuclear cells seen    No organisms seen    by cytocentrifuge  Preliminary Report (07 Mar 2019 07:21):    No growth    Culture - Acid Fast - Body Fluid w/Smear (collected 06 Mar 2019 05:14)  Source: .Body Fluid Pleural Fluid    Culture - Blood (collected 06 Mar 2019 02:50)  Source: .Blood Blood  Preliminary Report (07 Mar 2019 03:01):    No growth to date.    Culture - Blood (collected 06 Mar 2019 02:50)  Source: .Blood Blood  Preliminary Report (07 Mar 2019 03:01):    No growth to date.    RADIOLOGY:  [x] Reviewed and interpreted by me

## 2019-03-08 NOTE — SWALLOW BEDSIDE ASSESSMENT ADULT - ORAL PHASE
CR varied from 6-15 seconds/Delayed oral transit time CR varied from 10-20 seconds/Delayed oral transit time

## 2019-03-09 LAB
ANION GAP SERPL CALC-SCNC: 9 MMOL/L — SIGNIFICANT CHANGE UP (ref 5–17)
ANION GAP SERPL CALC-SCNC: 9 MMOL/L — SIGNIFICANT CHANGE UP (ref 5–17)
BASOPHILS # BLD AUTO: 0.01 K/UL — SIGNIFICANT CHANGE UP (ref 0–0.2)
BASOPHILS NFR BLD AUTO: 0.2 % — SIGNIFICANT CHANGE UP (ref 0–2)
BUN SERPL-MCNC: 20 MG/DL — SIGNIFICANT CHANGE UP (ref 7–23)
BUN SERPL-MCNC: 22 MG/DL — SIGNIFICANT CHANGE UP (ref 7–23)
CALCIUM SERPL-MCNC: 9.2 MG/DL — SIGNIFICANT CHANGE UP (ref 8.4–10.5)
CALCIUM SERPL-MCNC: 9.3 MG/DL — SIGNIFICANT CHANGE UP (ref 8.4–10.5)
CHLORIDE SERPL-SCNC: 105 MMOL/L — SIGNIFICANT CHANGE UP (ref 96–108)
CHLORIDE SERPL-SCNC: 105 MMOL/L — SIGNIFICANT CHANGE UP (ref 96–108)
CO2 SERPL-SCNC: 33 MMOL/L — HIGH (ref 22–31)
CO2 SERPL-SCNC: 34 MMOL/L — HIGH (ref 22–31)
CREAT SERPL-MCNC: 0.72 MG/DL — SIGNIFICANT CHANGE UP (ref 0.5–1.3)
CREAT SERPL-MCNC: 0.78 MG/DL — SIGNIFICANT CHANGE UP (ref 0.5–1.3)
CULTURE RESULTS: SIGNIFICANT CHANGE UP
CULTURE RESULTS: SIGNIFICANT CHANGE UP
EOSINOPHIL # BLD AUTO: 0.01 K/UL — SIGNIFICANT CHANGE UP (ref 0–0.5)
EOSINOPHIL NFR BLD AUTO: 0.2 % — SIGNIFICANT CHANGE UP (ref 0–6)
GLUCOSE SERPL-MCNC: 145 MG/DL — HIGH (ref 70–99)
GLUCOSE SERPL-MCNC: 177 MG/DL — HIGH (ref 70–99)
HCT VFR BLD CALC: 30.6 % — LOW (ref 34.5–45)
HGB BLD-MCNC: 9.3 G/DL — LOW (ref 11.5–15.5)
IMM GRANULOCYTES NFR BLD AUTO: 0.4 % — SIGNIFICANT CHANGE UP (ref 0–1.5)
LYMPHOCYTES # BLD AUTO: 0.62 K/UL — LOW (ref 1–3.3)
LYMPHOCYTES # BLD AUTO: 12.1 % — LOW (ref 13–44)
MAGNESIUM SERPL-MCNC: 2.2 MG/DL — SIGNIFICANT CHANGE UP (ref 1.6–2.6)
MCHC RBC-ENTMCNC: 30.4 GM/DL — LOW (ref 32–36)
MCHC RBC-ENTMCNC: 31 PG — SIGNIFICANT CHANGE UP (ref 27–34)
MCV RBC AUTO: 102 FL — HIGH (ref 80–100)
MONOCYTES # BLD AUTO: 0.47 K/UL — SIGNIFICANT CHANGE UP (ref 0–0.9)
MONOCYTES NFR BLD AUTO: 9.2 % — SIGNIFICANT CHANGE UP (ref 2–14)
NEUTROPHILS # BLD AUTO: 4 K/UL — SIGNIFICANT CHANGE UP (ref 1.8–7.4)
NEUTROPHILS NFR BLD AUTO: 77.9 % — HIGH (ref 43–77)
ORGANISM # SPEC MICROSCOPIC CNT: SIGNIFICANT CHANGE UP
PHOSPHATE SERPL-MCNC: 2.5 MG/DL — SIGNIFICANT CHANGE UP (ref 2.5–4.5)
PLATELET # BLD AUTO: 155 K/UL — SIGNIFICANT CHANGE UP (ref 150–400)
POTASSIUM SERPL-MCNC: 3.3 MMOL/L — LOW (ref 3.5–5.3)
POTASSIUM SERPL-MCNC: 4 MMOL/L — SIGNIFICANT CHANGE UP (ref 3.5–5.3)
POTASSIUM SERPL-SCNC: 3.3 MMOL/L — LOW (ref 3.5–5.3)
POTASSIUM SERPL-SCNC: 4 MMOL/L — SIGNIFICANT CHANGE UP (ref 3.5–5.3)
RBC # BLD: 3 M/UL — LOW (ref 3.8–5.2)
RBC # FLD: 17.5 % — HIGH (ref 10.3–14.5)
SODIUM SERPL-SCNC: 147 MMOL/L — HIGH (ref 135–145)
SODIUM SERPL-SCNC: 148 MMOL/L — HIGH (ref 135–145)
SPECIMEN SOURCE: SIGNIFICANT CHANGE UP
SPECIMEN SOURCE: SIGNIFICANT CHANGE UP
WBC # BLD: 5.13 K/UL — SIGNIFICANT CHANGE UP (ref 3.8–10.5)
WBC # FLD AUTO: 5.13 K/UL — SIGNIFICANT CHANGE UP (ref 3.8–10.5)

## 2019-03-09 PROCEDURE — 99233 SBSQ HOSP IP/OBS HIGH 50: CPT

## 2019-03-09 RX ORDER — SODIUM CHLORIDE 9 MG/ML
1000 INJECTION, SOLUTION INTRAVENOUS
Qty: 0 | Refills: 0 | Status: DISCONTINUED | OUTPATIENT
Start: 2019-03-09 | End: 2019-03-10

## 2019-03-09 RX ORDER — POTASSIUM CHLORIDE 20 MEQ
10 PACKET (EA) ORAL
Qty: 0 | Refills: 0 | Status: COMPLETED | OUTPATIENT
Start: 2019-03-09 | End: 2019-03-09

## 2019-03-09 RX ADMIN — Medication 5 MILLIGRAM(S): at 07:07

## 2019-03-09 RX ADMIN — SODIUM CHLORIDE 50 MILLILITER(S): 9 INJECTION, SOLUTION INTRAVENOUS at 11:46

## 2019-03-09 RX ADMIN — HEPARIN SODIUM 5000 UNIT(S): 5000 INJECTION INTRAVENOUS; SUBCUTANEOUS at 17:56

## 2019-03-09 RX ADMIN — HEPARIN SODIUM 5000 UNIT(S): 5000 INJECTION INTRAVENOUS; SUBCUTANEOUS at 07:07

## 2019-03-09 RX ADMIN — Medication 3 MILLILITER(S): at 00:32

## 2019-03-09 RX ADMIN — Medication 3 MILLILITER(S): at 23:05

## 2019-03-09 RX ADMIN — Medication 5 MILLIGRAM(S): at 17:56

## 2019-03-09 RX ADMIN — Medication 5 MILLIGRAM(S): at 22:40

## 2019-03-09 RX ADMIN — Medication 100 MILLIEQUIVALENT(S): at 11:44

## 2019-03-09 RX ADMIN — PIPERACILLIN AND TAZOBACTAM 25 GRAM(S): 4; .5 INJECTION, POWDER, LYOPHILIZED, FOR SOLUTION INTRAVENOUS at 17:55

## 2019-03-09 RX ADMIN — Medication 100 MILLIEQUIVALENT(S): at 13:27

## 2019-03-09 RX ADMIN — Medication 3 MILLILITER(S): at 17:56

## 2019-03-09 RX ADMIN — Medication 3 MILLILITER(S): at 11:44

## 2019-03-09 RX ADMIN — Medication 5 MILLIGRAM(S): at 11:45

## 2019-03-09 RX ADMIN — PIPERACILLIN AND TAZOBACTAM 25 GRAM(S): 4; .5 INJECTION, POWDER, LYOPHILIZED, FOR SOLUTION INTRAVENOUS at 07:07

## 2019-03-09 RX ADMIN — Medication 5 MILLIGRAM(S): at 00:32

## 2019-03-09 RX ADMIN — Medication 100 MILLIEQUIVALENT(S): at 16:28

## 2019-03-09 RX ADMIN — Medication 3 MILLILITER(S): at 07:07

## 2019-03-09 NOTE — PROGRESS NOTE ADULT - ASSESSMENT
89 year old female with PMH of Alzheimer's dementia, HTN, Afib; presented with hypercapnic respiratory failure secondary to pleural effusion s/p intubation/extubation and thoracentesis in MICU. Course complicated by encephalopathy and multi-organism bacteremia. Found to have RLL necrotizing PNA and colitis on CT abdomen.

## 2019-03-09 NOTE — PHYSICAL THERAPY INITIAL EVALUATION ADULT - PERTINENT HX OF CURRENT PROBLEM, REHAB EVAL
89F with PMH of Alzheimer's, HTN, AF ( not on AC), CHF (last documented EF from 2015 of 62%), asthma, cholecystectomy who presents to ED with progressive lethargy and dyspnea over a 3-4 day period, and worsening LE edema. Found to have hypercapnic respiratory failure and intubated; also underwent thoracentesis (3/06/19) for L sided effusion, likely related to HFrEF exacerbation. Pt was extubated, transitioned to BiPAP and transferred to medicine. Failed S&S evaluation 3/08.

## 2019-03-09 NOTE — PROGRESS NOTE ADULT - SUBJECTIVE AND OBJECTIVE BOX
CHIEF COMPLAINT:     Interval Events:    REVIEW OF SYSTEMS:  Constitutional:   Eyes:  ENT:  CV:  Resp:  GI:  :  MSK:  Integumentary:  Neurological:  Psychiatric:  Endocrine:  Hematologic/Lymphatic:  Allergic/Immunologic:  [ ] All other systems negative  [ ] Unable to assess ROS because ________    OBJECTIVE:  ICU Vital Signs Last 24 Hrs  T(C): 36.9 (09 Mar 2019 17:31), Max: 37.1 (09 Mar 2019 06:56)  T(F): 98.5 (09 Mar 2019 17:31), Max: 98.7 (09 Mar 2019 06:56)  HR: 84 (09 Mar 2019 17:31) (67 - 84)  BP: 157/70 (09 Mar 2019 17:31) (150/66 - 168/62)  BP(mean): --  ABP: --  ABP(mean): --  RR: 18 (09 Mar 2019 17:31) (18 - 20)  SpO2: 100% (09 Mar 2019 17:31) (96% - 100%)        03-08 @ 07:01 - 03-09 @ 07:00  --------------------------------------------------------  IN: 820 mL / OUT: 375 mL / NET: 445 mL    03-09 @ 06:01  - 03-09 @ 18:59  --------------------------------------------------------  IN: 740 mL / OUT: 300 mL / NET: 440 mL      CAPILLARY BLOOD GLUCOSE      POCT Blood Glucose.: 174 mg/dL (09 Mar 2019 18:24)      PHYSICAL EXAM:  General:   HEENT:   Lymph Nodes:  Neck:   Respiratory:   Cardiovascular:   Abdomen:   Extremities:   Skin:   Neurological:  Psychiatry:    HOSPITAL MEDICATIONS:  MEDICATIONS  (STANDING):  ALBUTerol/ipratropium for Nebulization 3 milliLiter(s) Nebulizer every 6 hours  aspirin  chewable 81 milliGRAM(s) Oral daily  dextrose 5%. 1000 milliLiter(s) (50 mL/Hr) IV Continuous <Continuous>  dextrose 50% Injectable 25 Gram(s) IV Push once  dextrose 50% Injectable 25 Gram(s) IV Push once  heparin  Injectable 5000 Unit(s) SubCutaneous every 12 hours  insulin lispro (HumaLOG) corrective regimen sliding scale   SubCutaneous every 6 hours  metoprolol tartrate Injectable 5 milliGRAM(s) IV Push every 6 hours  piperacillin/tazobactam IVPB. 3.375 Gram(s) IV Intermittent every 12 hours  polyethylene glycol 3350 17 Gram(s) Oral daily  senna Syrup 10 milliLiter(s) Oral at bedtime    MEDICATIONS  (PRN):  ALBUTerol    90 MICROgram(s) HFA Inhaler 2 Puff(s) Inhalation every 12 hours PRN asthma      LABS:                        9.3    5.13  )-----------( 155      ( 09 Mar 2019 10:26 )             30.6     03-09    148<H>  |  105  |  22  ----------------------------<  145<H>  3.3<L>   |  34<H>  |  0.78    Ca    9.2      09 Mar 2019 07:28  Phos  2.5     03-09  Mg     2.2     03-09    TPro  5.8<L>  /  Alb  2.7<L>  /  TBili  0.7  /  DBili  x   /  AST  34  /  ALT  20  /  AlkPhos  75  03-08    PT/INR - ( 08 Mar 2019 10:02 )   PT: 12.0 sec;   INR: 1.04 ratio         PTT - ( 08 Mar 2019 10:02 )  PTT:35.6 sec          MICROBIOLOGY:     RADIOLOGY:  < from: Xray Chest 1 View- PORTABLE-Urgent (03.07.19 @ 19:25) >  Interstitial edema and bilateral effusions are unchanged. Right apical   pleural thickening is stable. Right upper lobe focal area of scarring or   atelectasis also stable.    < end of copied text >  < from: CT Chest No Cont (03.04.19 @ 14:43) >  LUNGS AND LARGE AIRWAYS: Patchy groundglass opacities of left upper lobe.   Left lower lobe compressive atelectasis.    Right upper lobe apical scarring and and bronchiectasis with subsegmental   atelectasis. Right middle lobe subsegmental atelectasis.    In the right lower lobe is mild bronchiectasis and patchy groundglass and   dense opacities. There are secretions within the right lower lobe   bronchus and the segmental bronchi, best shown in the superior segment of   the right lower lobe.    Calcified nodules in the right middle lobe and right upper lobe.    PLEURA: Moderate left and small right pleural effusions. The pleura is   thickened along the right hemithorax, finding best shown in the right   upper lobe. No pneumothorax.  IMPRESSION:     1.  Left pleural effusion and passive atelectasis of left lower lobe.  2.  Right upper lobe volume loss and bronchiectasis with patchy opacities   in the right upper lobe and to a lesser extent the right middle lobe and   right lower lobe can be the sequelae of prior scarring/infection.    < end of copied text >        PULMONARY FUNCTION TESTS:    EKG: CHIEF COMPLAINT: shortness of breath    HPI:  89 yr old female with PMHx of alzhiemer, HTN, Afib, diastolic CHF (EF 3/6/19 of 75%), Asthma (doesn't follow with pulmonologist), cholecystectomy who presented to ED on 3/4/19 with 3-4 days of  progressive SOB with lethargy and 1 day of productive cough. Noted to have increased lower extremity edema with L>R, initially treated with lasix (changed to spironolactone the week prior to presentation). In the ED VBG of 7.21/104, with proBNP of 49058. Was started on lasix for diuresis, bipap and vanco/zosyn/Azithromax and intubated in the ED.     Admitted to the MICU for hypercapnic resp failure. Had a thoracentesis with 750cc serous fluid withdrawn, studies borderline exudative (T Protein 2.9, , serum protein 5.1). Extubated on 3/5/19. Has been on Vanc/Zosyn for pneumonia till 3/7, Vancomycin stopped on 3/7, now only on Zosyn.  Combicath Cultures from 3/4/19 with normal respiratory jayro, subsequent sputum and blood cultures NGTD. Has not had leukocytosis. has been afebrile the entire hospital stay. Urine legionella negative. CT abdomen from 3/7/19 with bilateral pleural effusions L>R, Worsening RLL consolidation (compared to CT form 3/4/19), read by radiology as suspicious for necrotizing PNA. Pulmonary consulted for further evaluation. On review of imaging with CHEST radiologist, what is interpreted as "gas" are contiguous dilated airways in the background of known bronchiectasis.     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    Allergies  No Known Allergies    OBJECTIVE:  ICU Vital Signs Last 24 Hrs  T(C): 36.9 (09 Mar 2019 17:31), Max: 37.1 (09 Mar 2019 06:56)  T(F): 98.5 (09 Mar 2019 17:31), Max: 98.7 (09 Mar 2019 06:56)  HR: 84 (09 Mar 2019 17:31) (67 - 84)  BP: 157/70 (09 Mar 2019 17:31) (150/66 - 168/62)  BP(mean): --  ABP: --  ABP(mean): --  RR: 18 (09 Mar 2019 17:31) (18 - 20)  SpO2: 100% (09 Mar 2019 17:31) (96% - 100%)        03-08 @ 07:01  - 03-09 @ 07:00  --------------------------------------------------------  IN: 820 mL / OUT: 375 mL / NET: 445 mL    03-09 @ 06:01  - 03-09 @ 18:59  --------------------------------------------------------  IN: 740 mL / OUT: 300 mL / NET: 440 mL      CAPILLARY BLOOD GLUCOSE      POCT Blood Glucose.: 174 mg/dL (09 Mar 2019 18:24)      PHYSICAL EXAM:  General:   HEENT:   Lymph Nodes:  Neck:   Respiratory:   Cardiovascular:   Abdomen:   Extremities:   Skin:   Neurological:  Psychiatry:    HOSPITAL MEDICATIONS:  MEDICATIONS  (STANDING):  ALBUTerol/ipratropium for Nebulization 3 milliLiter(s) Nebulizer every 6 hours  aspirin  chewable 81 milliGRAM(s) Oral daily  dextrose 5%. 1000 milliLiter(s) (50 mL/Hr) IV Continuous <Continuous>  dextrose 50% Injectable 25 Gram(s) IV Push once  dextrose 50% Injectable 25 Gram(s) IV Push once  heparin  Injectable 5000 Unit(s) SubCutaneous every 12 hours  insulin lispro (HumaLOG) corrective regimen sliding scale   SubCutaneous every 6 hours  metoprolol tartrate Injectable 5 milliGRAM(s) IV Push every 6 hours  piperacillin/tazobactam IVPB. 3.375 Gram(s) IV Intermittent every 12 hours  polyethylene glycol 3350 17 Gram(s) Oral daily  senna Syrup 10 milliLiter(s) Oral at bedtime    MEDICATIONS  (PRN):  ALBUTerol    90 MICROgram(s) HFA Inhaler 2 Puff(s) Inhalation every 12 hours PRN asthma      LABS:                        9.3    5.13  )-----------( 155      ( 09 Mar 2019 10:26 )             30.6     03-09    148<H>  |  105  |  22  ----------------------------<  145<H>  3.3<L>   |  34<H>  |  0.78    Ca    9.2      09 Mar 2019 07:28  Phos  2.5     03-09  Mg     2.2     03-09    TPro  5.8<L>  /  Alb  2.7<L>  /  TBili  0.7  /  DBili  x   /  AST  34  /  ALT  20  /  AlkPhos  75  03-08    PT/INR - ( 08 Mar 2019 10:02 )   PT: 12.0 sec;   INR: 1.04 ratio         PTT - ( 08 Mar 2019 10:02 )  PTT:35.6 sec          MICROBIOLOGY:     RADIOLOGY:  < from: Xray Chest 1 View- PORTABLE-Urgent (03.07.19 @ 19:25) >  Interstitial edema and bilateral effusions are unchanged. Right apical   pleural thickening is stable. Right upper lobe focal area of scarring or   atelectasis also stable.    < end of copied text >  < from: CT Chest No Cont (03.04.19 @ 14:43) >  LUNGS AND LARGE AIRWAYS: Patchy groundglass opacities of left upper lobe.   Left lower lobe compressive atelectasis.    Right upper lobe apical scarring and and bronchiectasis with subsegmental   atelectasis. Right middle lobe subsegmental atelectasis.    In the right lower lobe is mild bronchiectasis and patchy groundglass and   dense opacities. There are secretions within the right lower lobe   bronchus and the segmental bronchi, best shown in the superior segment of   the right lower lobe.    Calcified nodules in the right middle lobe and right upper lobe.    PLEURA: Moderate left and small right pleural effusions. The pleura is   thickened along the right hemithorax, finding best shown in the right   upper lobe. No pneumothorax.  IMPRESSION:     1.  Left pleural effusion and passive atelectasis of left lower lobe.  2.  Right upper lobe volume loss and bronchiectasis with patchy opacities   in the right upper lobe and to a lesser extent the right middle lobe and   right lower lobe can be the sequelae of prior scarring/infection.    < end of copied text >        PULMONARY FUNCTION TESTS:    EKG: CHIEF COMPLAINT: shortness of breath    HPI:  89 yr old female with PMHx of alzhiemer, HTN, Afib, diastolic CHF (EF 3/6/19 of 75%), Asthma (doesn't follow with pulmonologist), cholecystectomy who presented to ED on 3/4/19 with 3-4 days of  progressive SOB with lethargy and 1 day of productive cough. Noted to have increased lower extremity edema with L>R, initially treated with lasix (changed to spironolactone the week prior to presentation). In the ED VBG of 7.21/104, with proBNP of 49753. Was started on lasix for diuresis, bipap and vanco/zosyn/Azithromax and intubated in the ED.     Admitted to the MICU for hypercapnic resp failure. Had a thoracentesis with 750cc serous fluid withdrawn, studies borderline exudative (T Protein 2.9, , serum protein 5.1). Extubated on 3/5/19. Has been on Vanc/Zosyn for pneumonia till 3/7, Vancomycin stopped on 3/7, now only on Zosyn.  Combicath Cultures from 3/4/19 with normal respiratory jayro, subsequent sputum and blood cultures NGTD. Has not had leukocytosis. has been afebrile the entire hospital stay. Urine legionella negative. CT abdomen from 3/7/19 with bilateral pleural effusions L>R, Worsening RLL consolidation (compared to CT form 3/4/19), read by radiology as suspicious for necrotizing PNA. Pulmonary consulted for further evaluation. On review of imaging with CHEST radiologist, what is interpreted as "gas" are contiguous dilated airways in the background of known bronchiectasis.     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    Allergies  No Known Allergies    OBJECTIVE:  ICU Vital Signs Last 24 Hrs  T(C): 36.9 (09 Mar 2019 17:31), Max: 37.1 (09 Mar 2019 06:56)  T(F): 98.5 (09 Mar 2019 17:31), Max: 98.7 (09 Mar 2019 06:56)  HR: 84 (09 Mar 2019 17:31) (67 - 84)  BP: 157/70 (09 Mar 2019 17:31) (150/66 - 168/62)  BP(mean): --  ABP: --  ABP(mean): --  RR: 18 (09 Mar 2019 17:31) (18 - 20)  SpO2: 100% (09 Mar 2019 17:31) (96% - 100%)        03-08 @ 07:01  - 03-09 @ 07:00  --------------------------------------------------------  IN: 820 mL / OUT: 375 mL / NET: 445 mL    03-09 @ 06:01  - 03-09 @ 18:59  --------------------------------------------------------  IN: 740 mL / OUT: 300 mL / NET: 440 mL      CAPILLARY BLOOD GLUCOSE      POCT Blood Glucose.: 174 mg/dL (09 Mar 2019 18:24)      PHYSICAL EXAM:   GENERAL: NAD, thin  HEAD:  Atraumatic, Normocephalic, ?oral thrush.   EYES: Conjunctiva and sclera pale.   NECK: Supple  CHEST/LUNG: Coarse/congestion breath sounds with rhonchi bilaterally.   HEART: S1S2 normal. Irregular rate and rhythm; systolic murmur.   ABDOMEN: Soft, Nontender, Nondistended; Bowel sounds present  EXTREMITIES: No LE edema  PSYCH/Neuro: Very sleepy but opens eyes briefly and follows basic commands. Answers basic questions, but somewhat confused per granddaughter. Moves extremities.   SKIN: No rashes or lesions      HOSPITAL MEDICATIONS:  MEDICATIONS  (STANDING):  ALBUTerol/ipratropium for Nebulization 3 milliLiter(s) Nebulizer every 6 hours  aspirin  chewable 81 milliGRAM(s) Oral daily  dextrose 5%. 1000 milliLiter(s) (50 mL/Hr) IV Continuous <Continuous>  dextrose 50% Injectable 25 Gram(s) IV Push once  dextrose 50% Injectable 25 Gram(s) IV Push once  heparin  Injectable 5000 Unit(s) SubCutaneous every 12 hours  insulin lispro (HumaLOG) corrective regimen sliding scale   SubCutaneous every 6 hours  metoprolol tartrate Injectable 5 milliGRAM(s) IV Push every 6 hours  piperacillin/tazobactam IVPB. 3.375 Gram(s) IV Intermittent every 12 hours  polyethylene glycol 3350 17 Gram(s) Oral daily  senna Syrup 10 milliLiter(s) Oral at bedtime    MEDICATIONS  (PRN):  ALBUTerol    90 MICROgram(s) HFA Inhaler 2 Puff(s) Inhalation every 12 hours PRN asthma      LABS:                        9.3    5.13  )-----------( 155      ( 09 Mar 2019 10:26 )             30.6     03-09    148<H>  |  105  |  22  ----------------------------<  145<H>  3.3<L>   |  34<H>  |  0.78    Ca    9.2      09 Mar 2019 07:28  Phos  2.5     03-09  Mg     2.2     03-09    TPro  5.8<L>  /  Alb  2.7<L>  /  TBili  0.7  /  DBili  x   /  AST  34  /  ALT  20  /  AlkPhos  75  03-08    PT/INR - ( 08 Mar 2019 10:02 )   PT: 12.0 sec;   INR: 1.04 ratio         PTT - ( 08 Mar 2019 10:02 )  PTT:35.6 sec          MICROBIOLOGY:     RADIOLOGY:  < from: Xray Chest 1 View- PORTABLE-Urgent (03.07.19 @ 19:25) >  Interstitial edema and bilateral effusions are unchanged. Right apical   pleural thickening is stable. Right upper lobe focal area of scarring or   atelectasis also stable.    < end of copied text >  < from: CT Chest No Cont (03.04.19 @ 14:43) >  LUNGS AND LARGE AIRWAYS: Patchy groundglass opacities of left upper lobe.   Left lower lobe compressive atelectasis.    Right upper lobe apical scarring and and bronchiectasis with subsegmental   atelectasis. Right middle lobe subsegmental atelectasis.    In the right lower lobe is mild bronchiectasis and patchy groundglass and   dense opacities. There are secretions within the right lower lobe   bronchus and the segmental bronchi, best shown in the superior segment of   the right lower lobe.    Calcified nodules in the right middle lobe and right upper lobe.    PLEURA: Moderate left and small right pleural effusions. The pleura is   thickened along the right hemithorax, finding best shown in the right   upper lobe. No pneumothorax.  IMPRESSION:     1.  Left pleural effusion and passive atelectasis of left lower lobe.  2.  Right upper lobe volume loss and bronchiectasis with patchy opacities   in the right upper lobe and to a lesser extent the right middle lobe and   right lower lobe can be the sequelae of prior scarring/infection.    < end of copied text >        PULMONARY FUNCTION TESTS:    EKG:

## 2019-03-09 NOTE — PHYSICAL THERAPY INITIAL EVALUATION ADULT - TRANSFER SAFETY CONCERNS NOTED: BED/CHAIR, REHAB EVAL
decreased weight-shifting ability/decreased step length/decreased safety awareness/decreased balance during turns

## 2019-03-09 NOTE — PHYSICAL THERAPY INITIAL EVALUATION ADULT - LIVES WITH, PROFILE
children/As per chart pt resides with daughter in a private home  with 8 steps to enter.  Prior to admission pt with 7hr x7days HHA & requires assist for ADLs and uses cane/walker for ambulation.

## 2019-03-09 NOTE — PROVIDER CONTACT NOTE (OTHER) - ACTION/TREATMENT ORDERED:
NP made aware, midnight metoprolol will be administered early as per orders. Pt safety maintained, call bell in reach. will continue to monitor.

## 2019-03-09 NOTE — PROGRESS NOTE ADULT - PROBLEM SELECTOR PLAN 2
-Not on AC at home due to age and fall risk.    -C/w ASA when able to take PO.   -C/w IV metoprolol for now while NPO. Will increase to 5mg IV Q6H in view of borderline elevated BP.   -Was on digoxin at home, held here due to elevated level on admission. Level eventually came down. Consider resuming if rate not controlled.

## 2019-03-09 NOTE — PROGRESS NOTE ADULT - PROBLEM SELECTOR PLAN 6
-Found to have Coagulase negative Staph, Enterococcus faecalis, Strep species on earlier cultures. Blood cultures subsequently cleared.   -CT abdomen shows colitis, which likely explains the multi-organism bacteremia.   -C/w Zosyn for now, per ID recs.   -Echo without any obvious vegetations.  -Consider treatment for ?oral thrush seen on exam.

## 2019-03-09 NOTE — PROGRESS NOTE ADULT - PROBLEM SELECTOR PLAN 1
-S/p extubation in MICU and thoracentesis.   -CT abdomen shows RLL areas of cavitation, concerning for necrotizing PNA.   -C/w Zosyn for now, per ID recs.   -Now improved on nasal cannula and Bipap at bedtime.  -F/u pulmonary eval.  -C/w Duonebs Q6H.  -Airway clearance device, Chest Vest, and Chest PT, and aspiration precautions and elevate HOB. OOB to chair.

## 2019-03-09 NOTE — PROVIDER CONTACT NOTE (OTHER) - BACKGROUND
3/4 admitted w/ b/l pleural effusion   3/7 came to 2 adam/failed speech and swallow test  3/8 palliative came to see pt

## 2019-03-09 NOTE — PHYSICAL THERAPY INITIAL EVALUATION ADULT - PASSIVE RANGE OF MOTION EXAMINATION, REHAB EVAL
difficult to access due to decreased ability to follow commands/bilateral lower extremity Passive ROM was WFL (within functional limits)

## 2019-03-09 NOTE — PHYSICAL THERAPY INITIAL EVALUATION ADULT - ADDITIONAL COMMENTS
3/07/19 CT ABDOMEN/PELVIS: Bilateral pleural effusions, left greater than right.   Parenchymal consolidation in the right lower lobe has increased since 3/4/2019, and there are central areas of cavitation, suspicious for necrotizing pneumonia. Diffuse colitis involving the descending colon. Infectious, ischemic, and inflammatory etiologies are considered. Small to moderate volume of ascites. Cystic lesions in the pancreas, increased in size.  XR CHEST: Interstitial edema and bilateral effusions are unchanged. Right apical pleural thickening is stable. Right upper lobe focal area of scarring or atelectasis also stable.

## 2019-03-09 NOTE — PROGRESS NOTE ADULT - SUBJECTIVE AND OBJECTIVE BOX
Pt seen and examined at bedside by Dr. Rachel Walters -- Pager [phone]    Patient is a 89y old  Female who presents with a chief complaint of hypercapnic resp failure (08 Mar 2019 16:11)      SUBJECTIVE / OVERNIGHT EVENTS:  Pt alert, no acute events/issues.  NPO. Weakness/fatigue    REVIEW OF SYSTEMS:    CONSTITUTIONAL: No weakness, fevers or chills  EYES/ENT: No visual changes;  No vertigo or throat pain   NECK: No pain or stiffness  RESPIRATORY: No cough, wheezing, hemoptysis; No shortness of breath  CARDIOVASCULAR: No chest pain or palpitations  GASTROINTESTINAL: No abdominal or epigastric pain. No nausea, vomiting, or hematemesis; No diarrhea or constipation. No melena or hematochezia.  GENITOURINARY: No dysuria, frequency or hematuria  NEUROLOGICAL: No numbness or weakness  SKIN: No itching, rashes  EXT: no edema, No deformities, or weakness       MEDICATIONS  (STANDING):  ALBUTerol/ipratropium for Nebulization 3 milliLiter(s) Nebulizer every 6 hours  aspirin  chewable 81 milliGRAM(s) Oral daily  dextrose 5%. 1000 milliLiter(s) (50 mL/Hr) IV Continuous <Continuous>  dextrose 50% Injectable 25 Gram(s) IV Push once  dextrose 50% Injectable 25 Gram(s) IV Push once  heparin  Injectable 5000 Unit(s) SubCutaneous every 12 hours  insulin lispro (HumaLOG) corrective regimen sliding scale   SubCutaneous every 6 hours  metoprolol tartrate Injectable 5 milliGRAM(s) IV Push every 6 hours  piperacillin/tazobactam IVPB. 3.375 Gram(s) IV Intermittent every 12 hours  polyethylene glycol 3350 17 Gram(s) Oral daily  potassium chloride  10 mEq/100 mL IVPB 10 milliEquivalent(s) IV Intermittent every 1 hour  senna Syrup 10 milliLiter(s) Oral at bedtime    MEDICATIONS  (PRN):  ALBUTerol    90 MICROgram(s) HFA Inhaler 2 Puff(s) Inhalation every 12 hours PRN asthma      Vital Signs Last 24 Hrs  T(C): 36.4 (09 Mar 2019 13:35), Max: 37.1 (08 Mar 2019 17:17)  T(F): 97.5 (09 Mar 2019 13:35), Max: 98.8 (08 Mar 2019 17:17)  HR: 69 (09 Mar 2019 13:35) (67 - 91)  BP: 158/92 (09 Mar 2019 13:35) (150/66 - 168/62)  BP(mean): --  RR: 20 (09 Mar 2019 13:35) (18 - 20)  SpO2: 100% (09 Mar 2019 13:35) (96% - 100%)  CAPILLARY BLOOD GLUCOSE      POCT Blood Glucose.: 149 mg/dL (09 Mar 2019 11:58)  POCT Blood Glucose.: 148 mg/dL (09 Mar 2019 06:44)  POCT Blood Glucose.: 149 mg/dL (08 Mar 2019 23:53)  POCT Blood Glucose.: 155 mg/dL (08 Mar 2019 18:07)    I&O's Summary    08 Mar 2019 07:01  -  09 Mar 2019 07:00  --------------------------------------------------------  IN: 820 mL / OUT: 375 mL / NET: 445 mL    09 Mar 2019 06:01  -  09 Mar 2019 13:48  --------------------------------------------------------  IN: 290 mL / OUT: 100 mL / NET: 190 mL          PHYSICAL EXAM  GENERAL: NAD, ill appearing   HEAD:  Atraumatic, Normocephalic  EYES: EOMI, PERRLA, conjunctiva and sclera clear  NECK: Supple, No JVD  CHEST/LUNG: Clear to auscultation bilaterally; No wheeze  HEART: Regular rate and rhythm; No murmurs, rubs, or gallops  ABDOMEN: Soft, Nontender, Nondistended; Bowel sounds present  EXTREMITIES:  2+ Peripheral Pulses, No clubbing, cyanosis; No edema  PSYCH: Alert, Ox0  SKIN: No rashes or lesions  NEURO: 5/5 Muscle strength x4.  Sensation intact. CN intact.       LABS:                        9.3    5.13  )-----------( 155      ( 09 Mar 2019 10:26 )             30.6     03-09    148<H>  |  105  |  22  ----------------------------<  145<H>  3.3<L>   |  34<H>  |  0.78    Ca    9.2      09 Mar 2019 07:28  Phos  2.5     03-09  Mg     2.2     03-09    TPro  5.8<L>  /  Alb  2.7<L>  /  TBili  0.7  /  DBili  x   /  AST  34  /  ALT  20  /  AlkPhos  75  03-08    PT/INR - ( 08 Mar 2019 10:02 )   PT: 12.0 sec;   INR: 1.04 ratio         PTT - ( 08 Mar 2019 10:02 )  PTT:35.6 sec          RADIOLOGY & ADDITIONAL TESTS:    Imaging Personally Reviewed:  Consultant(s) Notes Reviewed:  ID, Palliative, Pulm  Care Discussed with Consultants/Other Providers: Medicine NP

## 2019-03-09 NOTE — PROGRESS NOTE ADULT - PROBLEM SELECTOR PLAN 5
-Patient unable to participate with swallow eval due to poor participation. Family reportedly would like to work with Speech to have a repeat swallow eval Monday if patient's mental status is improved. -Reportedly they would like to hold off on NGT until that time, but can readdress NGT daily.   -Nutrition eval appreciated.  -C/w ELVER Q6H while NPO.

## 2019-03-09 NOTE — PROGRESS NOTE ADULT - PROBLEM SELECTOR PLAN 3
-Exudative based on Light's criteria, possibly due to necrotizing PNA seen on CT. Culture with no growth but after antibiotics had already been initiated. Respiratory status is improved after tap.  -CXR showed Interstitial edema and bilateral effusions are unchanged. Right apical   pleural thickening is stable. Right upper lobe focal area of scarring or atelectasis also stable.  -History of chronic diastolic CHF. Caution with diuresis in view of patient being NPO.   -F/u fluid cytopathology.  -Monitor strict I's/O's and daily weights.

## 2019-03-09 NOTE — PHYSICAL THERAPY INITIAL EVALUATION ADULT - MANUAL MUSCLE TESTING RESULTS, REHAB EVAL
grossly assessed due to/difficult to access due to decreased ability to follow commands; BUE grossly at least 3+/5 throughout

## 2019-03-09 NOTE — PHYSICAL THERAPY INITIAL EVALUATION ADULT - CRITERIA FOR SKILLED THERAPEUTIC INTERVENTIONS
functional limitations in following categories/therapy frequency/anticipated equipment needs at discharge/anticipated discharge recommendation/predicted duration of therapy intervention/impairments found/risk reduction/prevention/rehab potential

## 2019-03-09 NOTE — PHYSICAL THERAPY INITIAL EVALUATION ADULT - REFERRING PHYSICIAN, REHAB EVAL
Antoinette cMlaughlin. Consult- PT evaluate and treat, Activity- increase as tolerated, Activity- OOB to chair

## 2019-03-09 NOTE — PROGRESS NOTE ADULT - PROBLEM SELECTOR PLAN 7
-Patient also with some acute encephalopathy on chronic dementia. Likely multifactorial due to hospitalization and infection, etc.   -Monitor mental status closely. If patient becomes acutely more somnolent, check ABG to look for CO2 retention.  -Fall precautions, general neuro checks.

## 2019-03-09 NOTE — PHYSICAL THERAPY INITIAL EVALUATION ADULT - TRANSFER TRAINING, PT EVAL
GOAL: Pt will perform ALL transfers with min assist, w/use of appropriate assistive device as needed, in 3 weeks.

## 2019-03-09 NOTE — PROGRESS NOTE ADULT - ATTENDING COMMENTS
89 yr old female with PMHx of alzhiemer, HTN, Afib, diastolic CHF (EF 3/6/19 of 75%), Asthma (doesn't follow with pulmonologist), cholecystectomy who presented to ED on 3/4/19 with 3-4 days of progressive SOB, lethargy and cough. In the ED she was intubated and admitted to the MICU for hypoxic respiratory failure, septic shock/PNA. Improved diuresis, antibiotics and thoracentesis of left sided pleural effusion.    Current abdominal Ct shows dense consolidation in the RLL with areas of air interpreted as necrotizing pneumonia.  From prior CT chest performed during this admission she has chronic bronchiectatic and cystic changes in the right lung, likely sequelae of a prior infection.  Would continue antibioitcs for 10 -14 day course.      - Recommend she receive a longer course of abx (10-12 days of abx)  - TO help mobilize her secretions:   	- Continue Duonebs Q6H              - chest physiotherapy  	pt unable to use accapella device  	- Sit her up in a chair for as long as she can tolerate during the day  - F/u thora cytopathology  - Pulmonary will continue to follow. 89 yr old female with PMHx of alzhiemer, HTN, Afib, diastolic CHF (EF 3/6/19 of 75%), Asthma (doesn't follow with pulmonologist), cholecystectomy who presented to ED on 3/4/19 with 3-4 days of progressive SOB, lethargy and cough. In the ED she was intubated and admitted to the MICU for hypoxic respiratory failure, septic shock/PNA. Improved diuresis, antibiotics and thoracentesis of left sided pleural effusion.    Current abdominal Ct shows dense consolidation in the RLL with areas of air interpreted as necrotizing pneumonia.  From prior CT chest performed during this admission she has chronic bronchiectatic and cystic changes in the right lung, likely sequelae of a prior infection.  Would continue antibioitcs for 10 -14 day course.      - Recommend she receive a longer course of abx (10-12 days of abx)  - TO help mobilize her secretions:   	- Continue Duonebs Q6H              - chest physiotherapy  	pt unable to use accapella device  	- Sit her up in a chair for as long as she can tolerate during the day  -gentle diuresis if tolerated  - F/u thora cytopathology  - Pulmonary will continue to follow.

## 2019-03-09 NOTE — PROGRESS NOTE ADULT - PROBLEM SELECTOR PLAN 4
-VANIA on CKD-3. -VANIA now resolved.   -C/w LR D5 30cc/hr for now since NPO. Caution with fluids in view of history of CHF.   -Avoid nephrotoxic meds. Renally dose meds.   -Monitor BMP.

## 2019-03-10 LAB
ANION GAP SERPL CALC-SCNC: 10 MMOL/L — SIGNIFICANT CHANGE UP (ref 5–17)
BUN SERPL-MCNC: 19 MG/DL — SIGNIFICANT CHANGE UP (ref 7–23)
CALCIUM SERPL-MCNC: 9.6 MG/DL — SIGNIFICANT CHANGE UP (ref 8.4–10.5)
CHLORIDE SERPL-SCNC: 105 MMOL/L — SIGNIFICANT CHANGE UP (ref 96–108)
CO2 SERPL-SCNC: 32 MMOL/L — HIGH (ref 22–31)
CREAT SERPL-MCNC: 0.75 MG/DL — SIGNIFICANT CHANGE UP (ref 0.5–1.3)
GLUCOSE SERPL-MCNC: 186 MG/DL — HIGH (ref 70–99)
HCT VFR BLD CALC: 32.3 % — LOW (ref 34.5–45)
HGB BLD-MCNC: 9.7 G/DL — LOW (ref 11.5–15.5)
MCHC RBC-ENTMCNC: 30 GM/DL — LOW (ref 32–36)
MCHC RBC-ENTMCNC: 31.6 PG — SIGNIFICANT CHANGE UP (ref 27–34)
MCV RBC AUTO: 105.2 FL — HIGH (ref 80–100)
PLATELET # BLD AUTO: 136 K/UL — LOW (ref 150–400)
POTASSIUM SERPL-MCNC: 4 MMOL/L — SIGNIFICANT CHANGE UP (ref 3.5–5.3)
POTASSIUM SERPL-SCNC: 4 MMOL/L — SIGNIFICANT CHANGE UP (ref 3.5–5.3)
RBC # BLD: 3.07 M/UL — LOW (ref 3.8–5.2)
RBC # FLD: 17.2 % — HIGH (ref 10.3–14.5)
SODIUM SERPL-SCNC: 147 MMOL/L — HIGH (ref 135–145)
WBC # BLD: 4.42 K/UL — SIGNIFICANT CHANGE UP (ref 3.8–10.5)
WBC # FLD AUTO: 4.42 K/UL — SIGNIFICANT CHANGE UP (ref 3.8–10.5)

## 2019-03-10 PROCEDURE — 99233 SBSQ HOSP IP/OBS HIGH 50: CPT

## 2019-03-10 RX ORDER — PETROLATUM,WHITE
1 JELLY (GRAM) TOPICAL
Qty: 0 | Refills: 0 | Status: DISCONTINUED | OUTPATIENT
Start: 2019-03-10 | End: 2019-03-18

## 2019-03-10 RX ORDER — SODIUM CHLORIDE 9 MG/ML
1000 INJECTION, SOLUTION INTRAVENOUS
Qty: 0 | Refills: 0 | Status: DISCONTINUED | OUTPATIENT
Start: 2019-03-10 | End: 2019-03-10

## 2019-03-10 RX ORDER — BACITRACIN ZINC 500 UNIT/G
1 OINTMENT IN PACKET (EA) TOPICAL
Qty: 0 | Refills: 0 | Status: DISCONTINUED | OUTPATIENT
Start: 2019-03-10 | End: 2019-03-16

## 2019-03-10 RX ORDER — SODIUM CHLORIDE 9 MG/ML
1000 INJECTION, SOLUTION INTRAVENOUS
Qty: 0 | Refills: 0 | Status: DISCONTINUED | OUTPATIENT
Start: 2019-03-10 | End: 2019-03-17

## 2019-03-10 RX ADMIN — Medication 1 APPLICATION(S): at 05:32

## 2019-03-10 RX ADMIN — Medication 3 MILLILITER(S): at 13:17

## 2019-03-10 RX ADMIN — Medication 1 APPLICATION(S): at 17:05

## 2019-03-10 RX ADMIN — Medication 3 MILLILITER(S): at 05:41

## 2019-03-10 RX ADMIN — PIPERACILLIN AND TAZOBACTAM 25 GRAM(S): 4; .5 INJECTION, POWDER, LYOPHILIZED, FOR SOLUTION INTRAVENOUS at 17:34

## 2019-03-10 RX ADMIN — HEPARIN SODIUM 5000 UNIT(S): 5000 INJECTION INTRAVENOUS; SUBCUTANEOUS at 17:35

## 2019-03-10 RX ADMIN — PIPERACILLIN AND TAZOBACTAM 25 GRAM(S): 4; .5 INJECTION, POWDER, LYOPHILIZED, FOR SOLUTION INTRAVENOUS at 05:39

## 2019-03-10 RX ADMIN — Medication 1 APPLICATION(S): at 05:39

## 2019-03-10 RX ADMIN — Medication 5 MILLIGRAM(S): at 05:41

## 2019-03-10 RX ADMIN — Medication 5 MILLIGRAM(S): at 17:35

## 2019-03-10 RX ADMIN — Medication 3 MILLILITER(S): at 23:04

## 2019-03-10 RX ADMIN — Medication 1 APPLICATION(S): at 17:34

## 2019-03-10 RX ADMIN — Medication 3 MILLILITER(S): at 17:35

## 2019-03-10 RX ADMIN — HEPARIN SODIUM 5000 UNIT(S): 5000 INJECTION INTRAVENOUS; SUBCUTANEOUS at 05:41

## 2019-03-10 RX ADMIN — Medication 5 MILLIGRAM(S): at 13:16

## 2019-03-10 RX ADMIN — Medication 5 MILLIGRAM(S): at 22:26

## 2019-03-10 NOTE — PROGRESS NOTE ADULT - SUBJECTIVE AND OBJECTIVE BOX
Pt seen and examined at bedside by Dr. Rachel Walters -- Pager [phone]    Patient is a 89y old  Female who presents with a chief complaint of hypercapnic resp failure (09 Mar 2019 18:58)      SUBJECTIVE / OVERNIGHT EVENTS:    REVIEW OF SYSTEMS:    CONSTITUTIONAL: No weakness, fevers or chills  EYES/ENT: No visual changes;  No vertigo or throat pain   NECK: No pain or stiffness  RESPIRATORY: No cough, wheezing, hemoptysis; No shortness of breath  CARDIOVASCULAR: No chest pain or palpitations  GASTROINTESTINAL: No abdominal or epigastric pain. No nausea, vomiting, or hematemesis; No diarrhea or constipation. No melena or hematochezia.  GENITOURINARY: No dysuria, frequency or hematuria  NEUROLOGICAL: No numbness or weakness  SKIN: No itching, rashes  EXT: No edema, deformities, or weakness  HEME/ONC: No bleeding, no weight loss, no lymphadenopathy, no night sweats.     MEDICATIONS  (STANDING):  ALBUTerol/ipratropium for Nebulization 3 milliLiter(s) Nebulizer every 6 hours  aspirin  chewable 81 milliGRAM(s) Oral daily  BACItracin   Ointment 1 Application(s) Topical two times a day  dextrose 5%. 1000 milliLiter(s) (50 mL/Hr) IV Continuous <Continuous>  dextrose 50% Injectable 25 Gram(s) IV Push once  dextrose 50% Injectable 25 Gram(s) IV Push once  heparin  Injectable 5000 Unit(s) SubCutaneous every 12 hours  insulin lispro (HumaLOG) corrective regimen sliding scale   SubCutaneous every 6 hours  metoprolol tartrate Injectable 5 milliGRAM(s) IV Push every 6 hours  petrolatum white Ointment 1 Application(s) Topical two times a day  piperacillin/tazobactam IVPB. 3.375 Gram(s) IV Intermittent every 12 hours  polyethylene glycol 3350 17 Gram(s) Oral daily  senna Syrup 10 milliLiter(s) Oral at bedtime    MEDICATIONS  (PRN):  ALBUTerol    90 MICROgram(s) HFA Inhaler 2 Puff(s) Inhalation every 12 hours PRN asthma      Vital Signs Last 24 Hrs  T(C): 36.3 (10 Mar 2019 09:20), Max: 36.9 (09 Mar 2019 17:31)  T(F): 97.4 (10 Mar 2019 09:20), Max: 98.5 (09 Mar 2019 17:31)  HR: 63 (10 Mar 2019 09:20) (61 - 84)  BP: 154/62 (10 Mar 2019 09:20) (154/62 - 175/68)  BP(mean): --  RR: 20 (10 Mar 2019 09:20) (18 - 20)  SpO2: 100% (10 Mar 2019 09:20) (96% - 100%)  CAPILLARY BLOOD GLUCOSE      POCT Blood Glucose.: 150 mg/dL (10 Mar 2019 05:50)  POCT Blood Glucose.: 175 mg/dL (10 Mar 2019 00:26)  POCT Blood Glucose.: 174 mg/dL (09 Mar 2019 18:24)    I&O's Summary    09 Mar 2019 06:01  -  10 Mar 2019 07:00  --------------------------------------------------------  IN: 1190 mL / OUT: 600 mL / NET: 590 mL    10 Mar 2019 07:01  -  10 Mar 2019 12:40  --------------------------------------------------------  IN: 0 mL / OUT: 0 mL / NET: 0 mL       PHYSICAL EXAM  GENERAL: NAD, well-developed  HEAD:  Atraumatic, Normocephalic  EYES: EOMI, PERRLA, conjunctiva and sclera clear  NECK: Supple, No JVD  CHEST/LUNG: Clear to auscultation bilaterally; No wheeze  HEART: Regular rate and rhythm; No murmurs, rubs, or gallops  ABDOMEN: Soft, Nontender, Nondistended; Bowel sounds present  EXTREMITIES:  2+ Peripheral Pulses, No clubbing, cyanosis; trace edema  PSYCH: AAOx3  SKIN: No rashes or lesions  NEURO: 5/5 Muscle strength x4.  Sensation intact.     LABS:                        9.7    4.42  )-----------( 136      ( 10 Mar 2019 07:54 )             32.3     03-10    147<H>  |  105  |  19  ----------------------------<  186<H>  4.0   |  32<H>  |  0.75    Ca    9.6      10 Mar 2019 03:12  Phos  2.5     03-09  Mg     2.2     03-09                RADIOLOGY & ADDITIONAL TESTS:    Imaging Personally Reviewed:  Consultant(s) Notes Reviewed:    Care Discussed with Consultants/Other Providers: Medicine NP

## 2019-03-11 DIAGNOSIS — Z51.5 ENCOUNTER FOR PALLIATIVE CARE: ICD-10-CM

## 2019-03-11 DIAGNOSIS — R13.10 DYSPHAGIA, UNSPECIFIED: ICD-10-CM

## 2019-03-11 LAB
ANION GAP SERPL CALC-SCNC: 10 MMOL/L — SIGNIFICANT CHANGE UP (ref 5–17)
BUN SERPL-MCNC: 13 MG/DL — SIGNIFICANT CHANGE UP (ref 7–23)
CALCIUM SERPL-MCNC: 9.7 MG/DL — SIGNIFICANT CHANGE UP (ref 8.4–10.5)
CHLORIDE SERPL-SCNC: 100 MMOL/L — SIGNIFICANT CHANGE UP (ref 96–108)
CO2 SERPL-SCNC: 30 MMOL/L — SIGNIFICANT CHANGE UP (ref 22–31)
CREAT SERPL-MCNC: 0.59 MG/DL — SIGNIFICANT CHANGE UP (ref 0.5–1.3)
CULTURE RESULTS: SIGNIFICANT CHANGE UP
GLUCOSE SERPL-MCNC: 216 MG/DL — HIGH (ref 70–99)
HCT VFR BLD CALC: 35.8 % — SIGNIFICANT CHANGE UP (ref 34.5–45)
HGB BLD-MCNC: 10.8 G/DL — LOW (ref 11.5–15.5)
MCHC RBC-ENTMCNC: 30.2 GM/DL — LOW (ref 32–36)
MCHC RBC-ENTMCNC: 31.1 PG — SIGNIFICANT CHANGE UP (ref 27–34)
MCV RBC AUTO: 103.2 FL — HIGH (ref 80–100)
PLATELET # BLD AUTO: 176 K/UL — SIGNIFICANT CHANGE UP (ref 150–400)
POTASSIUM SERPL-MCNC: 3.7 MMOL/L — SIGNIFICANT CHANGE UP (ref 3.5–5.3)
POTASSIUM SERPL-SCNC: 3.7 MMOL/L — SIGNIFICANT CHANGE UP (ref 3.5–5.3)
RBC # BLD: 3.47 M/UL — LOW (ref 3.8–5.2)
RBC # FLD: 16.4 % — HIGH (ref 10.3–14.5)
SODIUM SERPL-SCNC: 140 MMOL/L — SIGNIFICANT CHANGE UP (ref 135–145)
SPECIMEN SOURCE: SIGNIFICANT CHANGE UP
WBC # BLD: 4.92 K/UL — SIGNIFICANT CHANGE UP (ref 3.8–10.5)
WBC # FLD AUTO: 4.92 K/UL — SIGNIFICANT CHANGE UP (ref 3.8–10.5)

## 2019-03-11 PROCEDURE — 99232 SBSQ HOSP IP/OBS MODERATE 35: CPT

## 2019-03-11 PROCEDURE — 99233 SBSQ HOSP IP/OBS HIGH 50: CPT

## 2019-03-11 PROCEDURE — 99233 SBSQ HOSP IP/OBS HIGH 50: CPT | Mod: GC

## 2019-03-11 RX ORDER — GLUCAGON INJECTION, SOLUTION 0.5 MG/.1ML
1 INJECTION, SOLUTION SUBCUTANEOUS ONCE
Qty: 0 | Refills: 0 | Status: DISCONTINUED | OUTPATIENT
Start: 2019-03-11 | End: 2019-03-17

## 2019-03-11 RX ORDER — SODIUM CHLORIDE 9 MG/ML
1000 INJECTION INTRAMUSCULAR; INTRAVENOUS; SUBCUTANEOUS
Qty: 0 | Refills: 0 | Status: DISCONTINUED | OUTPATIENT
Start: 2019-03-11 | End: 2019-03-12

## 2019-03-11 RX ORDER — DEXTROSE 50 % IN WATER 50 %
15 SYRINGE (ML) INTRAVENOUS ONCE
Qty: 0 | Refills: 0 | Status: DISCONTINUED | OUTPATIENT
Start: 2019-03-11 | End: 2019-03-17

## 2019-03-11 RX ORDER — SODIUM CHLORIDE 9 MG/ML
1000 INJECTION, SOLUTION INTRAVENOUS
Qty: 0 | Refills: 0 | Status: DISCONTINUED | OUTPATIENT
Start: 2019-03-11 | End: 2019-03-17

## 2019-03-11 RX ORDER — SODIUM CHLORIDE 9 MG/ML
1000 INJECTION, SOLUTION INTRAVENOUS
Qty: 0 | Refills: 0 | Status: DISCONTINUED | OUTPATIENT
Start: 2019-03-11 | End: 2019-03-11

## 2019-03-11 RX ORDER — INSULIN LISPRO 100/ML
VIAL (ML) SUBCUTANEOUS
Qty: 0 | Refills: 0 | Status: DISCONTINUED | OUTPATIENT
Start: 2019-03-11 | End: 2019-03-14

## 2019-03-11 RX ORDER — METOPROLOL TARTRATE 50 MG
5 TABLET ORAL ONCE
Qty: 0 | Refills: 0 | Status: COMPLETED | OUTPATIENT
Start: 2019-03-11 | End: 2019-03-11

## 2019-03-11 RX ORDER — INSULIN LISPRO 100/ML
VIAL (ML) SUBCUTANEOUS AT BEDTIME
Qty: 0 | Refills: 0 | Status: DISCONTINUED | OUTPATIENT
Start: 2019-03-11 | End: 2019-03-14

## 2019-03-11 RX ADMIN — HEPARIN SODIUM 5000 UNIT(S): 5000 INJECTION INTRAVENOUS; SUBCUTANEOUS at 18:25

## 2019-03-11 RX ADMIN — Medication 5 MILLIGRAM(S): at 12:51

## 2019-03-11 RX ADMIN — Medication 2: at 18:25

## 2019-03-11 RX ADMIN — Medication 1 APPLICATION(S): at 05:57

## 2019-03-11 RX ADMIN — Medication 3 MILLILITER(S): at 12:51

## 2019-03-11 RX ADMIN — Medication 1 APPLICATION(S): at 05:56

## 2019-03-11 RX ADMIN — Medication 3 MILLILITER(S): at 18:25

## 2019-03-11 RX ADMIN — HEPARIN SODIUM 5000 UNIT(S): 5000 INJECTION INTRAVENOUS; SUBCUTANEOUS at 05:56

## 2019-03-11 RX ADMIN — Medication 5 MILLIGRAM(S): at 18:25

## 2019-03-11 RX ADMIN — Medication 3 MILLILITER(S): at 05:56

## 2019-03-11 RX ADMIN — Medication 5 MILLIGRAM(S): at 05:57

## 2019-03-11 RX ADMIN — Medication 5 MILLIGRAM(S): at 00:47

## 2019-03-11 RX ADMIN — PIPERACILLIN AND TAZOBACTAM 25 GRAM(S): 4; .5 INJECTION, POWDER, LYOPHILIZED, FOR SOLUTION INTRAVENOUS at 18:25

## 2019-03-11 RX ADMIN — PIPERACILLIN AND TAZOBACTAM 25 GRAM(S): 4; .5 INJECTION, POWDER, LYOPHILIZED, FOR SOLUTION INTRAVENOUS at 05:55

## 2019-03-11 RX ADMIN — Medication 1 APPLICATION(S): at 18:25

## 2019-03-11 NOTE — PROGRESS NOTE ADULT - PROBLEM SELECTOR PROBLEM 8
Palliative care by specialist
Advanced care planning/counseling discussion

## 2019-03-11 NOTE — PROGRESS NOTE ADULT - ATTENDING COMMENTS
Patient seen and examined, agree with above  Will c/w antibiotics for cavitary pneumonia and bacteremia as per ID.  Pleural fluid cultures NGTD and cytopathology negative for malignancy.  Aspiration precautions, comfort feeds, supportive care.  DVT ppx

## 2019-03-11 NOTE — PROGRESS NOTE ADULT - PROBLEM SELECTOR PROBLEM 6
VANIA (acute kidney injury)
Bacteremia
Severe protein-calorie malnutrition
Bacteremia

## 2019-03-11 NOTE — PROGRESS NOTE ADULT - PROBLEM SELECTOR PROBLEM 5
Pleural effusion
Severe protein-calorie malnutrition
CKD (chronic kidney disease), stage III
Severe protein-calorie malnutrition

## 2019-03-11 NOTE — SWALLOW BEDSIDE ASSESSMENT ADULT - NS ASR SWALLOW FINDINGS DISCUS
Physician/Nursing/Family/RN: Shanta NP: jorge l Light MD
Nursing/Family/Granddaughter, RN: Shanta NP: Nadege
Patient/Family/RN Jessa; ANGELA Galeas

## 2019-03-11 NOTE — PROGRESS NOTE ADULT - PROBLEM SELECTOR PLAN 7
-Patient also with some acute encephalopathy on chronic dementia. Likely multifactorial due to hospitalization and infection, etc.   -Monitor mental status closely.   -Fall precautions, general neuro checks.

## 2019-03-11 NOTE — PROGRESS NOTE ADULT - SUBJECTIVE AND OBJECTIVE BOX
CC: Patient is a 89y old  Female who presents with a chief complaint of hypercapnic resp failure (11 Mar 2019 18:06)    ID following for bacteremia    Interval History/ROS: Patient remains afebrile. Resting comfortably. Remains on NC.    Rest of ROS negative.    Allergies  No Known Allergies    ANTIMICROBIALS:  piperacillin/tazobactam IVPB. 3.375 every 12 hours    OTHER MEDS:  ALBUTerol    90 MICROgram(s) HFA Inhaler 2 Puff(s) Inhalation every 12 hours PRN  ALBUTerol/ipratropium for Nebulization 3 milliLiter(s) Nebulizer every 6 hours  aspirin  chewable 81 milliGRAM(s) Oral daily  BACItracin   Ointment 1 Application(s) Topical two times a day  dextrose 40% Gel 15 Gram(s) Oral once PRN  dextrose 5%. 1000 milliLiter(s) IV Continuous <Continuous>  dextrose 5%. 1000 milliLiter(s) IV Continuous <Continuous>  dextrose 5%. 1000 milliLiter(s) IV Continuous <Continuous>  dextrose 50% Injectable 25 Gram(s) IV Push once  dextrose 50% Injectable 25 Gram(s) IV Push once  glucagon  Injectable 1 milliGRAM(s) IntraMuscular once PRN  heparin  Injectable 5000 Unit(s) SubCutaneous every 12 hours  insulin lispro (HumaLOG) corrective regimen sliding scale   SubCutaneous three times a day before meals  insulin lispro (HumaLOG) corrective regimen sliding scale   SubCutaneous at bedtime  metoprolol tartrate Injectable 5 milliGRAM(s) IV Push every 6 hours  petrolatum white Ointment 1 Application(s) Topical two times a day  polyethylene glycol 3350 17 Gram(s) Oral daily  senna Syrup 10 milliLiter(s) Oral at bedtime    PE:    Vital Signs Last 24 Hrs  T(C): 36.3 (11 Mar 2019 18:28), Max: 36.7 (11 Mar 2019 05:50)  T(F): 97.4 (11 Mar 2019 18:28), Max: 98 (11 Mar 2019 05:50)  HR: 71 (11 Mar 2019 18:28) (59 - 94)  BP: 154/66 (11 Mar 2019 18:28) (154/66 - 187/77)  BP(mean): --  RR: 18 (11 Mar 2019 18:28) (18 - 18)  SpO2: 100% (11 Mar 2019 18:28) (94% - 100%)    Gen: Awake, NAD  CV: S1+S2 normal, no murmurs  Resp: Coarse breath sounds  Abd: Soft, nontender, +BS  Ext: No LE edema, no wounds  : No CVA tenderness  IV/Skin: No thrombophlebitis      LABS:                          10.8   4.92  )-----------( 176      ( 11 Mar 2019 08:01 )             35.8       03-11    140  |  100  |  13  ----------------------------<  216<H>  3.7   |  30  |  0.59    Ca    9.7      11 Mar 2019 06:47            MICROBIOLOGY:  v  .Body Fluid Pleural Fluid  03-06-19   Culture is being performed.  --    No polymorphonuclear cells seen  No organisms seen  by cytocentrifuge      .Blood Blood  03-06-19   No growth at 5 days.  --  --      .Sputum Sputum  03-05-19   Culture is being performed.  --  --      Bronch Wash Combicath  03-04-19   Normal Respiratory Anna present  --    Numerous polymorphonuclear leukocytes per low power field  Rare Squamous Epithelial Cells per low power field  Few Gram Variable Rods per oil power field  Rare Gram positive cocci in pairs per oil power field      .Urine Catheterized  03-04-19   No growth  --  --      .Blood Blood-Peripheral  03-04-19   Growth in aerobic bottle: Enterococcus faecalis  Growth in aerobic bottle: Staphylococcus epidermidis "Susceptibilities  not performed"  Growth in aerobic bottle: Streptococcus salivarius "Susceptibilities not  performed"  "Due to technical problems, Proteus sp. will Not be reported as part of  the BCID panel until further notice"  ***Blood Panel PCR results on this specimen are available  approximately 3 hours after the Gram stain result.***  Gram stain, PCR, and/or culture results may not always  correspond due to difference in methodologies.  ************************************************************  This PCR assay was performed using [employer].  The following targets are tested for: Enterococcus,  vancomycin resistant enterococci, Listeria monocytogenes,  coagulase negative staphylococci, S. aureus,  methicillin resistant S. aureus, Streptococcus agalactiae  (Group B), S. pneumoniae, S. pyogenes (Group A),  Acinetobacter baumannii, Enterobacter cloacae, E. coli,  Klebsiella oxytoca, K. pneumoniae, Proteus sp.,  Serratia marcescens, Haemophilus influenzae,  Neisseria meningitidis, Pseudomonas aeruginosa, Candida  albicans, C. glabrata, C krusei, C parapsilosis,  C. tropicalis and the KPC resistance gene.  --  Blood Culture PCR  Enterococcus faecalis    RADIOLOGY:    < from: CT Abdomen and Pelvis w/ IV Cont (03.07.19 @ 21:14) >  IMPRESSION: Bilateral pleural effusions, left greater than right.   Parenchymal consolidation in the right lower lobe has increased since   3/4/2019, and there are central areas of cavitation, suspicious for   necrotizing pneumonia.    Diffuse colitis involving the descending colon. Infectious, ischemic, and   inflammatory etiologies are considered.    Small to moderate volume of ascites.    Cystic lesions in the pancreas, increased in size.      < end of copied text >

## 2019-03-11 NOTE — PROGRESS NOTE ADULT - ASSESSMENT
89 yr old female with PMHx of alzhiemer, HTN, Afib, diastolic CHF (EF 3/6/19 of 75%), Asthma (doesn't follow with pulmonologist), cholecystectomy who presented to ED on 3/4/19 with 3-4 days of progressive SOB, lethargy and cough. In the ED she was intubated and admitted to the MICU for hypoxic respiratory failure, septic shock/PNA. Improved diuresis, antibiotics and thoracentesis of left sided pleural effusion.     Current abdominal CT shows dense consolidation in the RLL with areas of air interpreted as necrotizing pneumonia.  From prior CT chest performed during this admission she has chronic bronchiectatic and cystic changes in the right lung, likely sequelae of a prior infection.  Would continue antibiotics for total 10 -14 day course.      # Pneumonia  - continue antibiotics for total 10-14d with Zosyn  - c/w duonebs, chest PT, OOB to chair when possible  - F/u thora cytopathology  - 3/6 thoracentesis cytopathology demonstrates no malignant cells    # Diastolic HF  - lasix 10mg if BP can tolerate    # Alzheimers with likely recurrent aspiration  - Family has decided to pursue pleasure feeds understanding risk for aspiration  - continue with aspiration precautions during po challeneges  - Patient DNR/DNI 89 yr old female with PMHx of alzhiemer, HTN, Afib, diastolic CHF (EF 3/6/19 of 75%), Asthma (doesn't follow with pulmonologist), cholecystectomy who presented to ED on 3/4/19 with 3-4 days of progressive SOB, lethargy and cough. In the ED she was intubated and admitted to the MICU for hypoxic respiratory failure, septic shock/PNA. Improved diuresis, antibiotics and thoracentesis of left sided pleural effusion.     Current abdominal CT shows dense consolidation in the RLL with areas of air interpreted as necrotizing pneumonia.  From prior CT chest performed during this admission she has chronic bronchiectatic and cystic changes in the right lung, likely sequelae of a prior infection.  Would continue antibiotics for total 10 -14 day course.      # Pneumonia  - continue antibiotics for total 10-14d with Zosyn  - c/w duonebs, chest PT, OOB to chair when possible  - 3/6 thoracentesis cytopathology demonstrates no malignant cells and cultures no growth to date.     # Diastolic HF  - lasix 10mg if BP can tolerate    # Alzheimers with likely recurrent aspiration  - Family has decided to pursue pleasure feeds understanding risk for aspiration  - continue with aspiration precautions during po challeneges  - Patient DNR/DNI

## 2019-03-11 NOTE — PROGRESS NOTE ADULT - ASSESSMENT
89F with Alzheimer dementia, Afib, CHF and Asthma admitted 3/4/19 with hypoxic and hypercapnic respiratory failure, was intubated and admitted to MICU, managed for possible CHF and/or pneumonia. s/p thoracentesis for a left effusion 3/6/19, slightly exudative. Extubated and downgraded from MICU.   Blood cultures from 3/4/19 have grown Enterococcus faecalis and alpha Strep from one of four bottles. BioFire detected CoNS as well but it has not grown so far - check with micro and they will check again. If a true polymicrobial bacteremia would be concerned for a GI focus. Repeat cultures negative.   BAL and pleural fluid without bacterial growth    Afebrile and nontoxic but had an episode of hypothermia to 94.9F on night of 3/5   CT with colitis most likely source of enterococcus bacteremia  CT chest with cavitation suspicious for necrotizing PNA possibly aspiration PNA covered by zosyn    Recommend  -Continue Zosyn - would cover CT chest findings and enterococcal bacteremia and colitis  -Appreciate palliative input regarding GOC  -Anticipate a 2-week course of antibiotics for the enterococcus in blood cultures, cavitary PNA  -Aspiration precautions    prognosis guarded    Discussed with primary team

## 2019-03-11 NOTE — SWALLOW BEDSIDE ASSESSMENT ADULT - SWALLOW EVAL: DIAGNOSIS
Chart reviewed, attempted to see pt for repeat swallow evaluation to determine candidacy for initiation of PO diet and/or objective testing. Chart reviewed, attempted to see pt for repeat swallow evaluation to determine candidacy for initiation of PO diet and/or objective testing. Upon entry to room pt noted with granddaughter who served as . Pt appeared restless and tachypneic with RR between 38-38. RN: Eveline immediately alerted, who checked O2 saturations which measured low. Nasal cannula immediately placed on pt by RN and saturations rebounded to 99%. Pt appeared calm following placement of nasal cannula. NP: Nadege alerted of above event. Swallow evaluation and PO trials deferred at present time given episode of tachypnea and low O2 saturations. Service will f/u to re-assess swallow function pending improvement in status.

## 2019-03-11 NOTE — CHART NOTE - NSCHARTNOTEFT_GEN_A_CORE
Notified by RN pt with LUE swelling that is new from last night. Patient seen and examined at bedside appears very catechetic, not complaining or displaying pain to that arm. Can be from old infiltrated IV or blood draw, UE DVT low on suspicion but will order B/L UE dopplers to r/o DVT. Will continue to monitor.     Yomaira Joshi PA-C  [id number]

## 2019-03-11 NOTE — PROGRESS NOTE ADULT - PROBLEM SELECTOR PROBLEM 1
Acute respiratory failure with hypercapnia

## 2019-03-11 NOTE — SWALLOW BEDSIDE ASSESSMENT ADULT - SWALLOW EVAL: DIAGNOSIS
Attempted to see pt for repeat bedside swallow evaluation however pt now presenting with lethargy and appears to be utilizing accessory muscles for respiration, given above bedside swallow evaluation is not clinically appropriate at present time. Attending MD and NP: Nadege at bedside discussing GOC with pt's granddaughter. Family now considering comfort feeds and comfort GOC. Given above, this service will no longer actively follow, MD/ NP to reconsult this service if clinically indicated.

## 2019-03-11 NOTE — PROGRESS NOTE ADULT - PROBLEM SELECTOR PLAN 3
- family initially wanted to await S&S eval before planning for further discussion, but based on S&S notes today, after my evaluation they decided to proceed with comfort feeds  - will discuss with family tomorrow

## 2019-03-11 NOTE — PROGRESS NOTE ADULT - ASSESSMENT
89F with PMH of Alzheimer's, HTN, AF ( not on AC), CHF (last documented EF from 2015 of 62%), asthma, cholecystectomy who presented to ED with progressive lethargy and dyspnea over a 3-4 day period, and worsening LE edema. Found to have hypercapnic respiratory failure and intubated; also underwent thoracentesis (3/6) for L sided effusion, likely related to HFrEF exacerbation +/- PNA. Was extubated, transitioned to BiPAP and transferred to medicine. Failed S&S evaluation 3/8. Per family, patient has been declining slowly over the past few months, with less ambulation and less PO intake. Palliative was consulted to help discuss overall GOC.

## 2019-03-11 NOTE — PROGRESS NOTE ADULT - SUBJECTIVE AND OBJECTIVE BOX
CHIEF COMPLAINT:    Interval Events: Patient tolerating Bilevel overnight and nasal cannula during the day.    REVIEW OF SYSTEMS:  [x] review of systems unable to be assessed from patient    OBJECTIVE:  ICU Vital Signs Last 24 Hrs  T(C): 36.3 (11 Mar 2019 18:28), Max: 36.7 (11 Mar 2019 05:50)  T(F): 97.4 (11 Mar 2019 18:28), Max: 98 (11 Mar 2019 05:50)  HR: 71 (11 Mar 2019 18:28) (59 - 94)  BP: 154/66 (11 Mar 2019 18:28) (154/66 - 187/77)  BP(mean): --  ABP: --  ABP(mean): --  RR: 18 (11 Mar 2019 18:28) (18 - 18)  SpO2: 100% (11 Mar 2019 18:28) (94% - 100%)        03-10 @ 07:01  -  03-11 @ 07:00  --------------------------------------------------------  IN: 1400 mL / OUT: 400 mL / NET: 1000 mL    03-11 @ 07:01  - 03-11 @ 19:09  --------------------------------------------------------  IN: 0 mL / OUT: 100 mL / NET: -100 mL      CAPILLARY BLOOD GLUCOSE      POCT Blood Glucose.: 231 mg/dL (11 Mar 2019 18:21)      PHYSICAL EXAM:  General: fatigued appearing. laying supine with NC attached.   Neurology: unable to assess.   Eyes: PERRLA.  ENT/Neck: Neck supple, trachea midline, No JVD  Respiratory: scattered rales throughout  CV: RRR, +S1/S2, -S3/S4, no murmurs, rubs or gallops  Abdominal: Soft, NT, ND +BS, No HSM  MSK: unable to assess  Extremities: No edema, 2+ peripheral pulses  Skin: No Rashes, Hematoma, Ecchymosis    HOSPITAL MEDICATIONS:  MEDICATIONS  (STANDING):  ALBUTerol/ipratropium for Nebulization 3 milliLiter(s) Nebulizer every 6 hours  aspirin  chewable 81 milliGRAM(s) Oral daily  BACItracin   Ointment 1 Application(s) Topical two times a day  dextrose 5%. 1000 milliLiter(s) (50 mL/Hr) IV Continuous <Continuous>  dextrose 5%. 1000 milliLiter(s) (50 mL/Hr) IV Continuous <Continuous>  dextrose 5%. 1000 milliLiter(s) (50 mL/Hr) IV Continuous <Continuous>  dextrose 50% Injectable 25 Gram(s) IV Push once  dextrose 50% Injectable 25 Gram(s) IV Push once  heparin  Injectable 5000 Unit(s) SubCutaneous every 12 hours  insulin lispro (HumaLOG) corrective regimen sliding scale   SubCutaneous three times a day before meals  insulin lispro (HumaLOG) corrective regimen sliding scale   SubCutaneous at bedtime  metoprolol tartrate Injectable 5 milliGRAM(s) IV Push every 6 hours  petrolatum white Ointment 1 Application(s) Topical two times a day  piperacillin/tazobactam IVPB. 3.375 Gram(s) IV Intermittent every 12 hours  polyethylene glycol 3350 17 Gram(s) Oral daily  senna Syrup 10 milliLiter(s) Oral at bedtime    MEDICATIONS  (PRN):  ALBUTerol    90 MICROgram(s) HFA Inhaler 2 Puff(s) Inhalation every 12 hours PRN asthma  dextrose 40% Gel 15 Gram(s) Oral once PRN Blood Glucose LESS THAN 70 milliGRAM(s)/deciliter  glucagon  Injectable 1 milliGRAM(s) IntraMuscular once PRN Glucose LESS THAN 70 milligrams/deciliter      LABS:                        10.8   4.92  )-----------( 176      ( 11 Mar 2019 08:01 )             35.8     Hgb Trend: 10.8<--, 9.7<--, 9.3<--, 9.5<--, 10.1<--  03-11    140  |  100  |  13  ----------------------------<  216<H>  3.7   |  30  |  0.59    Ca    9.7      11 Mar 2019 06:47      Creatinine Trend: 0.59<--, 0.75<--, 0.72<--, 0.78<--, 0.91<--, 0.97<-- CHIEF COMPLAINT:    Interval Events: Patient tolerating Bilevel overnight and nasal cannula during the day.    REVIEW OF SYSTEMS:  [x] review of systems unable to be assessed from patient as lethargic    OBJECTIVE:  ICU Vital Signs Last 24 Hrs  T(C): 36.3 (11 Mar 2019 18:28), Max: 36.7 (11 Mar 2019 05:50)  T(F): 97.4 (11 Mar 2019 18:28), Max: 98 (11 Mar 2019 05:50)  HR: 71 (11 Mar 2019 18:28) (59 - 94)  BP: 154/66 (11 Mar 2019 18:28) (154/66 - 187/77)  BP(mean): --  ABP: --  ABP(mean): --  RR: 18 (11 Mar 2019 18:28) (18 - 18)  SpO2: 100% (11 Mar 2019 18:28) (94% - 100%)        03-10 @ 07:01  -  03-11 @ 07:00  --------------------------------------------------------  IN: 1400 mL / OUT: 400 mL / NET: 1000 mL    03-11 @ 07:01 - 03-11 @ 19:09  --------------------------------------------------------  IN: 0 mL / OUT: 100 mL / NET: -100 mL      CAPILLARY BLOOD GLUCOSE      POCT Blood Glucose.: 231 mg/dL (11 Mar 2019 18:21)      PHYSICAL EXAM:  General: fatigued appearing. laying supine with NC attached.   Neurology: unable to assess.   Eyes: PERRLA.  ENT/Neck: Neck supple, trachea midline, No JVD  Respiratory: scattered rales throughout  CV: RRR, +S1/S2, -S3/S4, no murmurs, rubs or gallops  Abdominal: Soft, NT, ND +BS, No HSM  MSK: unable to assess  Extremities: No edema, 2+ peripheral pulses  Skin: No Rashes, Hematoma, Ecchymosis    HOSPITAL MEDICATIONS:  MEDICATIONS  (STANDING):  ALBUTerol/ipratropium for Nebulization 3 milliLiter(s) Nebulizer every 6 hours  aspirin  chewable 81 milliGRAM(s) Oral daily  BACItracin   Ointment 1 Application(s) Topical two times a day  dextrose 5%. 1000 milliLiter(s) (50 mL/Hr) IV Continuous <Continuous>  dextrose 5%. 1000 milliLiter(s) (50 mL/Hr) IV Continuous <Continuous>  dextrose 5%. 1000 milliLiter(s) (50 mL/Hr) IV Continuous <Continuous>  dextrose 50% Injectable 25 Gram(s) IV Push once  dextrose 50% Injectable 25 Gram(s) IV Push once  heparin  Injectable 5000 Unit(s) SubCutaneous every 12 hours  insulin lispro (HumaLOG) corrective regimen sliding scale   SubCutaneous three times a day before meals  insulin lispro (HumaLOG) corrective regimen sliding scale   SubCutaneous at bedtime  metoprolol tartrate Injectable 5 milliGRAM(s) IV Push every 6 hours  petrolatum white Ointment 1 Application(s) Topical two times a day  piperacillin/tazobactam IVPB. 3.375 Gram(s) IV Intermittent every 12 hours  polyethylene glycol 3350 17 Gram(s) Oral daily  senna Syrup 10 milliLiter(s) Oral at bedtime    MEDICATIONS  (PRN):  ALBUTerol    90 MICROgram(s) HFA Inhaler 2 Puff(s) Inhalation every 12 hours PRN asthma  dextrose 40% Gel 15 Gram(s) Oral once PRN Blood Glucose LESS THAN 70 milliGRAM(s)/deciliter  glucagon  Injectable 1 milliGRAM(s) IntraMuscular once PRN Glucose LESS THAN 70 milligrams/deciliter      LABS:                        10.8   4.92  )-----------( 176      ( 11 Mar 2019 08:01 )             35.8     Hgb Trend: 10.8<--, 9.7<--, 9.3<--, 9.5<--, 10.1<--  03-11    140  |  100  |  13  ----------------------------<  216<H>  3.7   |  30  |  0.59    Ca    9.7      11 Mar 2019 06:47      Creatinine Trend: 0.59<--, 0.75<--, 0.72<--, 0.78<--, 0.91<--, 0.97<--    Radiology: reviewed by me  < from: Xray Chest 1 View- PORTABLE-Urgent (03.07.19 @ 19:25) >  EXAM:  XR CHEST PORTABLE URGENT 1V                            PROCEDURE DATE:  03/07/2019            INTERPRETATION:    Portable chest xray: Pleural effusion    Comparison: Most recent prior     FINDINGS:    Lines/Tubes: None    Heart and mediastinum:  Unchanged cardiomegaly.    Lungs, pleura, and airways: Interstitial edema and bilateral effusions   are unchanged. Right apical pleural thickening is stable. Right upper   lobe focal area of scarring or atelectasis also stable.    Bones and soft tissues: The bones and soft tissues are unchanged.    Impression:    Interstitial edema and bilateral effusions are unchanged. Right apical   pleural thickening is stable. Right upper lobe focal area of scarring or   atelectasis also stable.    HENRY HUDDLESTON M.D., ATTENDING RADIOLOGIST  This document has been electronically signed. Mar  8 2019 10:41AM    < end of copied text >    Micro:  Culture - Fungal, Body Fluid (03.06.19 @ 05:14)    Specimen Source: .Body Fluid Pleural Fluid    Culture Results:   Testing in progress    Culture - Body Fluid with Gram Stain (03.06.19 @ 05:14)    Gram Stain:   No polymorphonuclear cells seen  No organisms seen  by cytocentrifuge    Specimen Source: .Body Fluid Pleural Fluid    Culture Results:   No growth at 5 days    Cytopathology - Non Gyn Report (03.06.19 @ 02:41)    Cytopathology - Non Gyn Report:   ACCESSION No:  [id number]    KAUR WANG                           1        Cytopathology Report            Specimen(s) Submitted  PLEURAL FLUID, LEFT      Clinical History  89 years old female with patient medical history of [place],  HTN, Afib, HTN, CHF, Asthma, cholecystectomy who presents to ED  3/4/19 from home after developing progressive SOB with lethargy  over 3 to 4 days with productive cough.      Gross Description  Received: 40  ml of yellow fluid in CytoLyt  Prepared: 1 ThinPrep slide, 1 cell block , 1 smear      Final Diagnosis  PLEURAL FLUID, LEFT  NEGATIVE FOR MALIGNANT CELLS.  The cytology slides and cell block reveal scattered benign and  reactive mesothelial cells and lymphocytes.    Screened by: Shelby FLORES(ASCP)  Verified by: Kala Santos M.D.  (Electronic Signature)  Reported on: 03/08/19 18:20 EST, [address] [facility], [place], [place]  [id number] [place] [id number] technical processing performed at [address] [place] [address], [place] [id number]  _________________________________________________________________

## 2019-03-11 NOTE — PROGRESS NOTE ADULT - PROBLEM SELECTOR PROBLEM 2
Chronic atrial fibrillation
Bacteremia
Chronic atrial fibrillation

## 2019-03-11 NOTE — PROGRESS NOTE ADULT - PROBLEM SELECTOR PLAN 6
-Found to have Coagulase negative Staph, Enterococcus faecalis, Strep species on earlier cultures. Blood cultures subsequently cleared.   -CT abdomen shows colitis, which likely explains the multi-organism bacteremia.   -C/w Zosyn for now, per ID recs. for 2 week course of abx today (#8/14 days)   -Echo without any obvious vegetations.

## 2019-03-11 NOTE — SWALLOW BEDSIDE ASSESSMENT ADULT - COMMENTS
hx cont'd: Pt is slow to swallow / PO intake- unsure if true aspiration risk - consider speech pathology for swallow study.  Consider NGT for tube feeds is aspiration risk- would speak to the pt family they may opt for pleasure feeding for comfort instead of NGT in the event of impaired swallowing.  ID consulted for blood cultures which grew Enterococcus and positive by PCR also for CoNS and alpha Strep; Rx: CT abdomen pelvis with IV contrast if able to to find source of infection; Stop Vancomycin and Azithromycin; Continue Zosyn.  3/7 per RN: Pt is not appropriate for dysphagia screening at bedside.  CT A/P: IMPRESSION: Bilateral pleural effusions, left greater than right.   Parenchymal consolidation in the right lower lobe has increased since 3/4/2019, and there are central areas of cavitation, suspicious for necrotizing pneumonia. Diffuse colitis involving the descending colon. Infectious, ischemic, and inflammatory etiologies are considered. Small to moderate volume of ascites. Cystic lesions in the pancreas, increased in size.

## 2019-03-11 NOTE — PROGRESS NOTE ADULT - PROBLEM SELECTOR PLAN 5
-Patient unable to participate with swallow eval due to poor participation.   - S&S reevaluated today, due to pt being sleepy and increased work of breathing, unable to assess  -discussed dysphagia in detail with family, they would like to pursue comfort feeding at this time, they have declined NGT, PEG tube.   - comfort feeding started  - f/u palliative

## 2019-03-11 NOTE — PROGRESS NOTE ADULT - PROBLEM SELECTOR PLAN 9
-Heparin SQ for DVT PPx.  -PT eval.  - prognosis guarded
-Heparin SQ for DVT PPx.  -PT eval.

## 2019-03-11 NOTE — PROGRESS NOTE ADULT - SUBJECTIVE AND OBJECTIVE BOX
HPI: 89F with PMH of Alzheimer's, HTN, AF ( not on AC), CHF (last documented EF from 2015 of 62%), asthma, cholecystectomy who presented to ED with progressive lethargy and dyspnea over a 3-4 day period, and worsening LE edema. Found to have hypercapnic respiratory failure and intubated; also underwent thoracentesis (3/6) for L sided effusion, likely related to HFrEF exacerbation +/- PNA. Was extubated, transitioned to BiPAP and transferred to medicine. Failed S&S evaluation 3/8. Per family, patient has been declining slowly over the past few months, with less ambulation and less PO intake. Palliative was consulted to help discuss overall GOC.     INTERVAL EVENTS: patient sleeping, per family she appears better today, but was not able to participate with S&S eval    ADVANCE DIRECTIVES:    DNR [ X]  MOLST  [ X]    Living Will  [ ]   DECISION MAKER(s):  [ ] Health Care Proxy(s)  [X ] Surrogate(s)  [ ] Guardian           Name(s) and Contact(s):  Decision-maker: children (6 children) - Chantel Massey: [phone]  Archana (grand daughter) -  [phone]   Melissa (grand daughter) - [phone]    HOME MEDICATIONS:    REVIEW OF SYSTEMS:  unable to assess from patient    Dyspnea:                           [ ]Mild [ x]Moderate [ ]Severe  Anxiety:                             [ ]Mild [ ]Moderate [ ]Severe  Fatigue:                             [ ]Mild [x ]Moderate [ ]Severe  Nausea:                             [ ]Mild [ ]Moderate [ ]Severe  Loss of appetite:              [ ]Mild [ ]Moderate [ ]Severe  Constipation:                    [ ]Mild [ ]Moderate [ ]Severe    Other Symptoms:  [ ]All other review of systems negative     Karnofsky Performance Score/Palliative Performance Status Version 2:    20  %    http://palliative.info/resource_material/[email or web address].pdf    PHYSICAL EXAM:  Vital Signs Last 24 Hrs  T(C): 36.6 (11 Mar 2019 14:38), Max: 36.7 (11 Mar 2019 05:50)  T(F): 97.8 (11 Mar 2019 14:38), Max: 98 (11 Mar 2019 05:50)  HR: 73 (11 Mar 2019 14:38) (59 - 94)  BP: 185/72 (11 Mar 2019 14:38) (162/72 - 187/77)  BP(mean): --  RR: 18 (11 Mar 2019 14:38) (18 - 18)  SpO2: 96% (11 Mar 2019 14:38) (94% - 100%)    GENERAL:  [ ]Alert  [ ]Oriented  [ ]Lethargic  [ ]Cachexia  [ ]Unarousable  [ ]Verbal  [x ]Non-Verbal but could be verbal  Behavioral:   [ ] Anxiety  [ ] Delirium [ ] Agitation [ ] Other  HEENT:  [ x]Normal   [ ]Dry mouth   [ ]ET Tube/Trach  [ ]Oral lesions  PULMONARY:   [ ]Clear [ ]Tachypnea  [ ]Audible excessive secretions   [ ]Rhonchi        [ ]Right [ ]Left [x ]Bilateral  [ ]Crackles        [ ]Right [ ]Left [ ]Bilateral  [ ]Wheezing     [ ]Right [ ]Left [ ]Bilateral  CARDIOVASCULAR:    [ ]Regular [ ]Irregular [ ]Tachy  [ ]Alfonso [ ]Murmur [ ]Other  GASTROINTESTINAL:  [ ]Soft  [ ]Distended   [ ]+BS  [ ]Non tender [ ]Tender  [ ]PEG [ ]OGT/ NGT  Last BM: 3/11  GENITOURINARY:  [ ]Normal [x ] Incontinent   [ ]Oliguria/Anuria   [ ]Sawant  MUSCULOSKELETAL:   [ ]Normal   [ x]Weakness  [ x]Bed/Wheelchair bound [ x]Edema: 1+ LLE  NEUROLOGIC:   [ ]No focal deficits  [x ] Cognitive impairment  [ ] Dysphagia [ ]Dysarthria [ ] Paresis [ ]Other   SKIN:   [x ]Normal   [ ]Pressure ulcer(s)  [ ]Rash    CRITICAL CARE:  [ ] Shock Present  [ ]Septic [ ]Cardiogenic [ ]Neurologic [ ]Hypovolemic  [ ]  Vasopressors [ ]  Inotropes   [x ] Respiratory failure present  [x ] Acute  [ ] Chronic [ ] Hypoxic  [ ] Hypercarbic [ ] Other  [ ] Other organ failure     LABS:                                   10.8   4.92  )-----------( 176      ( 11 Mar 2019 08:01 )             35.8     03-11    140  |  100  |  13  ----------------------------<  216<H>  3.7   |  30  |  0.59    Ca    9.7      11 Mar 2019 06:47    RADIOLOGY & ADDITIONAL STUDIES:  CT Abdomen: 3/7/19  Bilateral pleural effusions, left greater than right. Parenchymal consolidation in the right lower lobe has increased since 3/4/2019, and there are central areas of cavitation, suspicious for   necrotizing pneumonia.  -Diffuse colitis involving the descending colon. Infectious, ischemic, and inflammatory etiologies are considered.  -Small to moderate volume of ascites.    CXR: 3/7/19  -Interstitial edema and bilateral effusions are unchanged. Right apical pleural thickening is stable. Right upper lobe focal area of scarring or atelectasis also stable.    PROTEIN CALORIE MALNUTRITION PRESENT: [ ] Yes [ ] No  [ ] PPSV2 < or = to 30% [ ] significant weight loss  [x ] poor nutritional intake [ ] catabolic state [ ] anasarca     Albumin, Serum: 2.7 g/dL (03-08-19 @ 07:16)  Artificial Nutrition [ ]     REFERRALS:   [ ]Chaplaincy  [ ] Hospice  [ ]Child Life  [ ]Social Work  [ ]Case management [ ]Holistic Therapy   Goals of Care Discussion Document: HPI: 89F with PMH of Alzheimer's, HTN, AF ( not on AC), CHF (last documented EF from 2015 of 62%), asthma, cholecystectomy who presented to ED with progressive lethargy and dyspnea over a 3-4 day period, and worsening LE edema. Found to have hypercapnic respiratory failure and intubated; also underwent thoracentesis (3/6) for L sided effusion, likely related to HFrEF exacerbation +/- PNA. Was extubated, transitioned to BiPAP and transferred to medicine. Failed S&S evaluation 3/8. Per family, patient has been declining slowly over the past few months, with less ambulation and less PO intake. Palliative was consulted to help discuss overall GOC.     INTERVAL EVENTS: patient sleeping, per family she appears better today, but was not able to participate with S&S eval    ADVANCE DIRECTIVES:    DNR [ X]  MOLST  [ X]    Living Will  [ ]   DECISION MAKER(s):  [ ] Health Care Proxy(s)  [X ] Surrogate(s)  [ ] Guardian           Name(s) and Contact(s):  Decision-maker: children (6 children) - Chantel Massey: [phone]  Archana (grand daughter) -  [phone]   Melissa (grand daughter) - [phone]    HOME MEDICATIONS:    REVIEW OF SYSTEMS:  unable to assess from patient    Dyspnea:                           [ ]Mild [ x]Moderate [ ]Severe  Anxiety:                             [ ]Mild [ ]Moderate [ ]Severe  Fatigue:                             [ ]Mild [x ]Moderate [ ]Severe  Nausea:                             [ ]Mild [ ]Moderate [ ]Severe  Loss of appetite:              [ ]Mild [ ]Moderate [ ]Severe  Constipation:                    [ ]Mild [ ]Moderate [ ]Severe    Other Symptoms:  [ ]All other review of systems negative     Karnofsky Performance Score/Palliative Performance Status Version 2:    20  %    http://palliative.info/resource_material/[email or web address].pdf    PHYSICAL EXAM:  Vital Signs Last 24 Hrs  T(C): 36.6 (11 Mar 2019 14:38), Max: 36.7 (11 Mar 2019 05:50)  T(F): 97.8 (11 Mar 2019 14:38), Max: 98 (11 Mar 2019 05:50)  HR: 73 (11 Mar 2019 14:38) (59 - 94)  BP: 185/72 (11 Mar 2019 14:38) (162/72 - 187/77)  BP(mean): --  RR: 18 (11 Mar 2019 14:38) (18 - 18)  SpO2: 96% (11 Mar 2019 14:38) (94% - 100%)    GENERAL:  [ ]Alert  [ ]Oriented  [ ]Lethargic  [ ]Cachexia  [ ]Unarousable  [ ]Verbal  [x ]Non-Verbal but could be verbal  Behavioral:   [ ] Anxiety  [ ] Delirium [ ] Agitation [ ] Other  HEENT:  [ x]Normal   [ ]Dry mouth   [ ]ET Tube/Trach  [ ]Oral lesions  PULMONARY:   [ ]Clear [ ]Tachypnea  [ ]Audible excessive secretions   [ ]Rhonchi        [ ]Right [ ]Left [x ]Bilateral  [ ]Crackles        [ ]Right [ ]Left [ ]Bilateral  [ ]Wheezing     [ ]Right [ ]Left [ ]Bilateral  CARDIOVASCULAR:    [ ]Regular [ ]Irregular [ ]Tachy  [ ]Alfonso [ ]Murmur [ ]Other  GASTROINTESTINAL:  [ ]Soft  [ ]Distended   [ ]+BS  [ ]Non tender [ ]Tender  [ ]PEG [ ]OGT/ NGT  Last BM: 3/11  GENITOURINARY:  [ ]Normal [x ] Incontinent   [ ]Oliguria/Anuria   [ ]Sawant  MUSCULOSKELETAL:   [ ]Normal   [ x]Weakness  [ x]Bed/Wheelchair bound [ x]Edema: 1+ LLE  NEUROLOGIC:   [ ]No focal deficits  [x ] Cognitive impairment  [ ] Dysphagia [ ]Dysarthria [ ] Paresis [ ]Other   SKIN:   [x ]Normal   [ ]Pressure ulcer(s)  [ ]Rash    CRITICAL CARE:  [ ] Shock Present  [ ]Septic [ ]Cardiogenic [ ]Neurologic [ ]Hypovolemic  [ ]  Vasopressors [ ]  Inotropes   [x ] Respiratory failure present  [x ] Acute  [ ] Chronic [ ] Hypoxic  [ ] Hypercarbic [ ] Other  [ ] Other organ failure     LABS:                          10.8   4.92  )-----------( 176      ( 11 Mar 2019 08:01 )             35.8     03-11    140  |  100  |  13  ----------------------------<  216<H>  3.7   |  30  |  0.59    Ca    9.7      11 Mar 2019 06:47    RADIOLOGY & ADDITIONAL STUDIES:  CT Abdomen: 3/7/19  Bilateral pleural effusions, left greater than right. Parenchymal consolidation in the right lower lobe has increased since 3/4/2019, and there are central areas of cavitation, suspicious for   necrotizing pneumonia.  -Diffuse colitis involving the descending colon. Infectious, ischemic, and inflammatory etiologies are considered.  -Small to moderate volume of ascites.    CXR: 3/7/19  -Interstitial edema and bilateral effusions are unchanged. Right apical pleural thickening is stable. Right upper lobe focal area of scarring or atelectasis also stable.    PROTEIN CALORIE MALNUTRITION PRESENT: [ ] Yes [ ] No  [ ] PPSV2 < or = to 30% [ ] significant weight loss  [x ] poor nutritional intake [ ] catabolic state [ ] anasarca     Albumin, Serum: 2.7 g/dL (03-08-19 @ 07:16)  Artificial Nutrition [ ]     REFERRALS:   [ ]Chaplaincy  [ ] Hospice  [ ]Child Life  [ ]Social Work  [ ]Case management [ ]Holistic Therapy   Goals of Care Discussion Document:

## 2019-03-11 NOTE — PROGRESS NOTE ADULT - SUBJECTIVE AND OBJECTIVE BOX
Patient is a 89y old  Female who presents with a chief complaint of hypercapnic resp failure (11 Mar 2019 16:36)      SUBJECTIVE / OVERNIGHT EVENTS: Patient seen and examined at beside. resting comfortably, had an episdoe of hypoxia this AM, placed on 2L NC, no Spo2 100%.     ROS:  All other review of systems negative    Allergies    No Known Allergies    Intolerances        MEDICATIONS  (STANDING):  ALBUTerol/ipratropium for Nebulization 3 milliLiter(s) Nebulizer every 6 hours  aspirin  chewable 81 milliGRAM(s) Oral daily  BACItracin   Ointment 1 Application(s) Topical two times a day  dextrose 5%. 1000 milliLiter(s) (50 mL/Hr) IV Continuous <Continuous>  dextrose 5%. 1000 milliLiter(s) (50 mL/Hr) IV Continuous <Continuous>  dextrose 5%. 1000 milliLiter(s) (50 mL/Hr) IV Continuous <Continuous>  dextrose 50% Injectable 25 Gram(s) IV Push once  dextrose 50% Injectable 25 Gram(s) IV Push once  heparin  Injectable 5000 Unit(s) SubCutaneous every 12 hours  insulin lispro (HumaLOG) corrective regimen sliding scale   SubCutaneous three times a day before meals  insulin lispro (HumaLOG) corrective regimen sliding scale   SubCutaneous at bedtime  metoprolol tartrate Injectable 5 milliGRAM(s) IV Push every 6 hours  petrolatum white Ointment 1 Application(s) Topical two times a day  piperacillin/tazobactam IVPB. 3.375 Gram(s) IV Intermittent every 12 hours  polyethylene glycol 3350 17 Gram(s) Oral daily  senna Syrup 10 milliLiter(s) Oral at bedtime    MEDICATIONS  (PRN):  ALBUTerol    90 MICROgram(s) HFA Inhaler 2 Puff(s) Inhalation every 12 hours PRN asthma  dextrose 40% Gel 15 Gram(s) Oral once PRN Blood Glucose LESS THAN 70 milliGRAM(s)/deciliter  glucagon  Injectable 1 milliGRAM(s) IntraMuscular once PRN Glucose LESS THAN 70 milligrams/deciliter      Vital Signs Last 24 Hrs  T(C): 36.6 (11 Mar 2019 14:38), Max: 36.7 (11 Mar 2019 05:50)  T(F): 97.8 (11 Mar 2019 14:38), Max: 98 (11 Mar 2019 05:50)  HR: 71 (11 Mar 2019 14:45) (59 - 94)  BP: 168/70 (11 Mar 2019 14:45) (162/72 - 187/77)  BP(mean): --  RR: 18 (11 Mar 2019 14:38) (18 - 18)  SpO2: 96% (11 Mar 2019 14:38) (94% - 100%)  CAPILLARY BLOOD GLUCOSE      POCT Blood Glucose.: 227 mg/dL (11 Mar 2019 13:00)  POCT Blood Glucose.: 198 mg/dL (11 Mar 2019 05:47)  POCT Blood Glucose.: 177 mg/dL (11 Mar 2019 00:10)  POCT Blood Glucose.: 188 mg/dL (10 Mar 2019 18:12)    I&O's Summary    10 Mar 2019 07:01  -  11 Mar 2019 07:00  --------------------------------------------------------  IN: 1400 mL / OUT: 400 mL / NET: 1000 mL    11 Mar 2019 07:01  -  11 Mar 2019 18:06  --------------------------------------------------------  IN: 0 mL / OUT: 0 mL / NET: 0 mL        PHYSICAL EXAM:  GENERAL: NAD, frail   HEAD:  Atraumatic, Normocephalic  EYES: EOMI, PERRLA, conjunctiva and sclera clear  CHEST/LUNG: decreased breath sound right lung.; No wheeze  HEART: Regular rate and rhythm; No murmurs, rubs, or gallops  ABDOMEN: Soft, Nontender, Nondistended; Bowel sounds present  EXTREMITIES:  2+ Peripheral Pulses, No clubbing, cyanosis, or edema  NEUROLOGY: awake and alert , slow to respond to questions   SKIN: No rashes or lesions    LABS:                        10.8   4.92  )-----------( 176      ( 11 Mar 2019 08:01 )             35.8     03-11    140  |  100  |  13  ----------------------------<  216<H>  3.7   |  30  |  0.59    Ca    9.7      11 Mar 2019 06:47                RADIOLOGY & ADDITIONAL TESTS:    Consultant(s) Notes Reviewed:  palliative and SLP    Care Discussed with Consultants/Other Providers: medicine NP     Case Discussed with Family: Dtr and granddaughter updated about plan    Goals of Care: DNR/DNI

## 2019-03-11 NOTE — PROGRESS NOTE ADULT - PROBLEM SELECTOR PLAN 8
-Had long conversation with patient's granddaughter and daughters at bedside. Informed them of patient's guarded prognosis.  - The family do not want feeding tube, would like to pursue comfort feeding   -MOLST completed by them, patient now DNR.
-Had long conversation with patient's granddaughter and daughters at bedside. Informed them of patient's guarded prognosis.  -Palliative care recs appreciated.   -MOLST completed by them, patient now DNR.

## 2019-03-11 NOTE — PROGRESS NOTE ADULT - PROBLEM SELECTOR PLAN 3
-Exudative based on Light's criteria, possibly due to necrotizing PNA seen on CT. Culture with no growth but after antibiotics had already been initiated. Respiratory status is improved after tap.  -CXR showed Interstitial edema and bilateral effusions are unchanged. Right apical   pleural thickening is stable. Right upper lobe focal area of scarring or atelectasis also stable.  -History of chronic diastolic CHF. Caution with diuresis in view of patient being NPO.   -fluid cytopathology: neg for malignancy   -Monitor strict I's/O's and daily weights.

## 2019-03-11 NOTE — PROGRESS NOTE ADULT - PROBLEM SELECTOR PLAN 2
-Not on AC at home due to age and fall risk.    -C/w ASA when able to take PO.   -C/w IV metoprolo 5mg IV Q6H for elevated BP  -Was on digoxin at home, held here due to elevated level on admission. Level eventually came down. Consider resuming if rate not controlled.

## 2019-03-12 DIAGNOSIS — I97.89 OTHER POSTPROCEDURAL COMPLICATIONS AND DISORDERS OF THE CIRCULATORY SYSTEM, NOT ELSEWHERE CLASSIFIED: ICD-10-CM

## 2019-03-12 LAB
ANION GAP SERPL CALC-SCNC: 8 MMOL/L — SIGNIFICANT CHANGE UP (ref 5–17)
BUN SERPL-MCNC: 11 MG/DL — SIGNIFICANT CHANGE UP (ref 7–23)
CALCIUM SERPL-MCNC: 9 MG/DL — SIGNIFICANT CHANGE UP (ref 8.4–10.5)
CHLORIDE SERPL-SCNC: 99 MMOL/L — SIGNIFICANT CHANGE UP (ref 96–108)
CO2 SERPL-SCNC: 32 MMOL/L — HIGH (ref 22–31)
CREAT SERPL-MCNC: 0.59 MG/DL — SIGNIFICANT CHANGE UP (ref 0.5–1.3)
GLUCOSE SERPL-MCNC: 86 MG/DL — SIGNIFICANT CHANGE UP (ref 70–99)
MAGNESIUM SERPL-MCNC: 2 MG/DL — SIGNIFICANT CHANGE UP (ref 1.6–2.6)
POTASSIUM SERPL-MCNC: 3.7 MMOL/L — SIGNIFICANT CHANGE UP (ref 3.5–5.3)
POTASSIUM SERPL-SCNC: 3.7 MMOL/L — SIGNIFICANT CHANGE UP (ref 3.5–5.3)
SODIUM SERPL-SCNC: 139 MMOL/L — SIGNIFICANT CHANGE UP (ref 135–145)

## 2019-03-12 PROCEDURE — 99233 SBSQ HOSP IP/OBS HIGH 50: CPT

## 2019-03-12 PROCEDURE — 93970 EXTREMITY STUDY: CPT | Mod: 26

## 2019-03-12 PROCEDURE — 99233 SBSQ HOSP IP/OBS HIGH 50: CPT | Mod: GC

## 2019-03-12 RX ORDER — DEXTROSE 50 % IN WATER 50 %
12.5 SYRINGE (ML) INTRAVENOUS ONCE
Qty: 0 | Refills: 0 | Status: COMPLETED | OUTPATIENT
Start: 2019-03-12 | End: 2019-03-12

## 2019-03-12 RX ORDER — DEXTROSE 50 % IN WATER 50 %
12.5 SYRINGE (ML) INTRAVENOUS ONCE
Qty: 0 | Refills: 0 | Status: DISCONTINUED | OUTPATIENT
Start: 2019-03-12 | End: 2019-03-12

## 2019-03-12 RX ORDER — DEXTROSE 50 % IN WATER 50 %
12.5 SYRINGE (ML) INTRAVENOUS ONCE
Qty: 0 | Refills: 0 | Status: DISCONTINUED | OUTPATIENT
Start: 2019-03-12 | End: 2019-03-17

## 2019-03-12 RX ORDER — SODIUM CHLORIDE 9 MG/ML
1000 INJECTION, SOLUTION INTRAVENOUS
Qty: 0 | Refills: 0 | Status: DISCONTINUED | OUTPATIENT
Start: 2019-03-12 | End: 2019-03-17

## 2019-03-12 RX ADMIN — SODIUM CHLORIDE 50 MILLILITER(S): 9 INJECTION, SOLUTION INTRAVENOUS at 17:36

## 2019-03-12 RX ADMIN — Medication 3 MILLILITER(S): at 05:28

## 2019-03-12 RX ADMIN — Medication 1 APPLICATION(S): at 17:37

## 2019-03-12 RX ADMIN — PIPERACILLIN AND TAZOBACTAM 25 GRAM(S): 4; .5 INJECTION, POWDER, LYOPHILIZED, FOR SOLUTION INTRAVENOUS at 17:35

## 2019-03-12 RX ADMIN — Medication 5 MILLIGRAM(S): at 17:37

## 2019-03-12 RX ADMIN — Medication 3 MILLILITER(S): at 00:56

## 2019-03-12 RX ADMIN — PIPERACILLIN AND TAZOBACTAM 25 GRAM(S): 4; .5 INJECTION, POWDER, LYOPHILIZED, FOR SOLUTION INTRAVENOUS at 05:29

## 2019-03-12 RX ADMIN — Medication 1 APPLICATION(S): at 05:29

## 2019-03-12 RX ADMIN — Medication 5 MILLIGRAM(S): at 12:56

## 2019-03-12 RX ADMIN — SODIUM CHLORIDE 50 MILLILITER(S): 9 INJECTION INTRAMUSCULAR; INTRAVENOUS; SUBCUTANEOUS at 00:56

## 2019-03-12 RX ADMIN — HEPARIN SODIUM 5000 UNIT(S): 5000 INJECTION INTRAVENOUS; SUBCUTANEOUS at 05:29

## 2019-03-12 RX ADMIN — Medication 12.5 GRAM(S): at 12:26

## 2019-03-12 RX ADMIN — Medication 3 MILLILITER(S): at 17:35

## 2019-03-12 RX ADMIN — HEPARIN SODIUM 5000 UNIT(S): 5000 INJECTION INTRAVENOUS; SUBCUTANEOUS at 17:37

## 2019-03-12 RX ADMIN — Medication 5 MILLIGRAM(S): at 01:03

## 2019-03-12 RX ADMIN — Medication 5 MILLIGRAM(S): at 05:29

## 2019-03-12 RX ADMIN — Medication 5 MILLIGRAM(S): at 23:38

## 2019-03-12 RX ADMIN — Medication 3 MILLILITER(S): at 12:56

## 2019-03-12 RX ADMIN — SODIUM CHLORIDE 50 MILLILITER(S): 9 INJECTION, SOLUTION INTRAVENOUS at 12:31

## 2019-03-12 RX ADMIN — Medication 1 APPLICATION(S): at 17:35

## 2019-03-12 RX ADMIN — Medication 3 MILLILITER(S): at 23:37

## 2019-03-12 NOTE — PROGRESS NOTE ADULT - ASSESSMENT
89 yr old female with PMHx of alzhiemer, HTN, Afib, diastolic CHF (EF 3/6/19 of 75%), Asthma (doesn't follow with pulmonologist), cholecystectomy who presented to ED on 3/4/19 with 3-4 days of progressive SOB, lethargy and cough. In the ED she was intubated and admitted to the MICU for hypoxic respiratory failure, septic shock/PNA. Improved diuresis, antibiotics and thoracentesis of left sided pleural effusion. Current abdominal CT shows dense consolidation in the RLL with areas of air interpreted as necrotizing pneumonia.  From prior CT chest performed during this admission she has chronic bronchiectatic and cystic changes in the right lung, likely sequelae of a prior infection.     # Pneumonia  - continue antibiotics for total 10-14d with Zosyn  - c/w duonebs, chest PT, OOB to chair when possible  - 3/6 thoracentesis cytopathology demonstrates no malignant cells and cultures no growth to date.     # Diastolic HF  - lasix 10mg if BP can tolerate    # Alzheimers with likely recurrent aspiration  - Family has decided to pursue pleasure feeds understanding risk for aspiration  - continue with aspiration precautions during po challeneges  - Patient DNR/DNI    At this time, Pulmonary will sign off. If there are any further management questions, please feel free to re-consult.     Leonardo Sauer MD  PGY-4  Pulmonary and Critical Care Fellow  Pager: [phone]

## 2019-03-12 NOTE — PROVIDER CONTACT NOTE (OTHER) - ACTION/TREATMENT ORDERED:
As per NP Jackelyn Villafuerte OK to hold PO medications. No further intervention required at this time. Will continue to monitor. As per NP Jackelyn PEREZ to hold PO medications. No further intervention required at this time. Will continue to monitor.

## 2019-03-12 NOTE — PROGRESS NOTE ADULT - NSICDXPROBLEM_GEN_ALL_CORE_FT
PROBLEM DIAGNOSES  Problem: Acute respiratory failure with hypercapnia  Assessment and Plan: -S/p extubation in MICU and thoracentesis. CT abdomen shows RLL areas of cavitation, concerning for necrotizing PNA.   -C/w Zosyn for now, per ID recs.   -Now improved on nasal cannula and Bipap at bedtime.  -pulmonary rec appraicted   -C/w Duonebs Q6H.  -Airway clearance device, Chest Vest, and Chest PT, and aspiration precautions and elevate HOB. OOB to chair.       Problem: Bacteremia  Assessment and Plan: Found to have Coagulase negative Staph, Enterococcus faecalis, Strep species on earlier cultures. Blood cultures subsequently cleared.   -CT abdomen shows colitis, which likely explains the multi-organism bacteremia.   -C/w Zosyn for now, per ID recs. for 2 week course of abx today (#9/14 days)   -Echo without any obvious vegetations.      Problem: Acute deep vein thrombosis (DVT) of left upper extremity after procedure  Assessment and Plan: DVT LE + with underling chronic Afib   - discussed AC risks and benifits in detail with patient's children and granddaughter,   - they would like to hold off on AC at this time. and c/w supporitve care     Problem: Encephalopathy  Assessment and Plan: Patient also with some acute encephalopathy on chronic dementia. Likely multifactorial due to hospitalization and infection, etc.   -Monitor mental status closely.   -Fall precautions,      Problem: Advanced care planning/counseling discussion  Assessment and Plan: Had long conversation with patient's granddaughter and daughters at bedside. Informed them of patient's guarded prognosis.  - The family do not want feeding tube, would like to pursue comfort feeding   -MOLST completed by them, patient now DNR.       Problem: Severe protein-calorie malnutrition  Assessment and Plan: Patient unable to participate with swallow eval due to poor participation.   - S&S reevaluated today, due to pt being sleepy and increased work of breathing, unable to assess  -discussed dysphagia in detail with family, they would like to pursue comfort feeding at this time, they have declined NGT, PEG tube.   - comfort feeding started  - d/w granddaughter about fm with pallaitive to discuss comfort care, she will let me know the time tomorrow.       Problem: VANIA (acute kidney injury)  Assessment and Plan: resolved monitor BMP     Problem: Prophylactic measure  Assessment and Plan: Heparin SQ for DVT PPx.  -PT eval.  - prognosis guarded      Problem: Chronic atrial fibrillation  Assessment and Plan: Not on AC at home due to age and fall risk.    -C/w ASA when able to take PO.   -C/w IV metoprolo 5mg IV Q6H for elevated BP  -Was on digoxin at home, held here due to elevated level on admission. Level eventually came down. Consider resuming if rate not controlled.       Problem: Pleural effusion  Assessment and Plan: 2/2 necrotizing PNA seen on CT. Culture with no growth but after antibiotics had already been initiated. Respiratory status is improved after tap.  -fluid cytopathology: neg for malignancy   -Monitor strict I's/O's and daily weights.

## 2019-03-12 NOTE — PROGRESS NOTE ADULT - ATTENDING COMMENTS
Patient seen and examined, agree with above. Patient with advanced medical comorbidities including Alzheimers and diastolic heart failure with underlying bronchiectatic lung disease who presents with hypercapnic respiratory failure and bilateral pleural effusions requiring intubation. She was subsequently extubated and is being treated for a necrotizing pneumonia and bacteremia.     - Continue antibiotics as per ID with plan for at least 2 week course - to treat necrotizing pneumonia, bacteremia, and colitis.  - Acute Hypercapnic Respiratory Failure - continue BIPAP QHS and PRN for comfort. She also has hypoxemia and would benefit from supplemental oxygen to maintain O2 sat > 90% (does not need to be 100%).  - Pleural effusion - s/p thoracentesis during this stay  - GOC are clear as per family wishes. Would continue Palliative Care support and consider Hospice evaluation at family request.    No further diagnostic pulmonary interventions are required at this time. Would continue treatment options aimed at patients comfort. Please call back with questions.

## 2019-03-12 NOTE — CHART NOTE - NSCHARTNOTEFT_GEN_A_CORE
Called by vascular lab to report pt with a dvt in the left upper axillary vein. Discussed with Dr. Rice, no intervention at this time. Will continue to monitor.    Phyllis Marin  Spectra-link [phone]

## 2019-03-12 NOTE — PROVIDER CONTACT NOTE (OTHER) - ACTION/TREATMENT ORDERED:
As per NP Jackelyn Munoz aware, will initiate hypoglycemic protocol, as per NP initiate D5 IV infusion at 50ml/hr. Will continue to monitor.

## 2019-03-12 NOTE — PROVIDER CONTACT NOTE (OTHER) - ACTION/TREATMENT ORDERED:
NP Alexander aware. Administer standing dose of anti-hypertensive med as prescribed. no further intervention reqiured. Will continue to monitor.

## 2019-03-12 NOTE — CHART NOTE - NSCHARTNOTEFT_GEN_A_CORE
Called by RN to report pt with a FS of 67. Pt seen at bedside, appears comfortable in bed. Hypoglycemia protocol followed. Started pt on D5 AT 50CC/HR X 12 hours. FS rechecked by RN and 125. Will continue to closely monitor.    Phyllis Max  Spectra [phone]

## 2019-03-12 NOTE — PROVIDER CONTACT NOTE (OTHER) - ACTION/TREATMENT ORDERED:
As per NP Jackelyn Munoz aware, maintained D5 infusion as prescribed. No further intervention required at this time. Will continue to monitor.

## 2019-03-12 NOTE — PROGRESS NOTE ADULT - SUBJECTIVE AND OBJECTIVE BOX
CHIEF COMPLAINT:    Interval Events: Patient compliant with Bilevel NIV overnight, currently on NC. Patient did not take to eating well last night, was reportedly too tired. Is currently on day 9 of Zosyn.    REVIEW OF SYSTEMS:  Unable to assess with patient    OBJECTIVE:  ICU Vital Signs Last 24 Hrs  T(C): 36.1 (12 Mar 2019 12:15), Max: 36.6 (11 Mar 2019 14:38)  T(F): 96.9 (12 Mar 2019 12:15), Max: 97.8 (11 Mar 2019 14:38)  HR: 68 (12 Mar 2019 12:15) (56 - 80)  BP: 148/85 (12 Mar 2019 12:15) (131/66 - 185/72)  BP(mean): --  ABP: --  ABP(mean): --  RR: 18 (12 Mar 2019 12:15) (18 - 18)  SpO2: 99% (12 Mar 2019 12:15) (96% - 100%)        03-11 @ 07:01  -  03-12 @ 07:00  --------------------------------------------------------  IN: 1150 mL / OUT: 250 mL / NET: 900 mL    03-12 @ 07:01  -  03-12 @ 12:27  --------------------------------------------------------  IN: 250 mL / OUT: 0 mL / NET: 250 mL      CAPILLARY BLOOD GLUCOSE      POCT Blood Glucose.: 67 mg/dL (12 Mar 2019 12:16)      PHYSICAL EXAM:  General: Frail appearing. On right side. fatigued.   Neurology: Unable to assess  Eyes: PERRLA  ENT/Neck: Neck supple, trachea midline, No JVD, Gross hearing intact  Respiratory: CTA B/L, No wheezing, rales, rhonchi  CV: RRR, +S1/S2, -S3/S4, no murmurs, rubs or gallops  Abdominal: Soft, NT, ND +BS, No HSM  MSK: Unable to assess  Extremities: No edema, 2+ peripheral pulses  Skin: No Rashes, Hematoma, Ecchymosis      HOSPITAL MEDICATIONS:  MEDICATIONS  (STANDING):  ALBUTerol/ipratropium for Nebulization 3 milliLiter(s) Nebulizer every 6 hours  aspirin  chewable 81 milliGRAM(s) Oral daily  BACItracin   Ointment 1 Application(s) Topical two times a day  dextrose 5%. 1000 milliLiter(s) (50 mL/Hr) IV Continuous <Continuous>  dextrose 5%. 1000 milliLiter(s) (50 mL/Hr) IV Continuous <Continuous>  dextrose 5%. 1000 milliLiter(s) (50 mL/Hr) IV Continuous <Continuous>  dextrose 50% Injectable 12.5 Gram(s) IV Push once  dextrose 50% Injectable 12.5 Gram(s) IV Push once  dextrose 50% Injectable 12.5 Gram(s) IV Push once  dextrose 50% Injectable 12.5 Gram(s) IV Push once  dextrose 50% Injectable 25 Gram(s) IV Push once  dextrose 50% Injectable 25 Gram(s) IV Push once  heparin  Injectable 5000 Unit(s) SubCutaneous every 12 hours  insulin lispro (HumaLOG) corrective regimen sliding scale   SubCutaneous three times a day before meals  insulin lispro (HumaLOG) corrective regimen sliding scale   SubCutaneous at bedtime  metoprolol tartrate Injectable 5 milliGRAM(s) IV Push every 6 hours  petrolatum white Ointment 1 Application(s) Topical two times a day  piperacillin/tazobactam IVPB. 3.375 Gram(s) IV Intermittent every 12 hours  polyethylene glycol 3350 17 Gram(s) Oral daily  senna Syrup 10 milliLiter(s) Oral at bedtime    MEDICATIONS  (PRN):  ALBUTerol    90 MICROgram(s) HFA Inhaler 2 Puff(s) Inhalation every 12 hours PRN asthma  dextrose 40% Gel 15 Gram(s) Oral once PRN Blood Glucose LESS THAN 70 milliGRAM(s)/deciliter  glucagon  Injectable 1 milliGRAM(s) IntraMuscular once PRN Glucose LESS THAN 70 milligrams/deciliter      LABS:                        11.3   2.5   )-----------( 143      ( 12 Mar 2019 09:19 )             33.0     Hgb Trend: 11.3<--, 10.8<--, 9.7<--, 9.3<--, 9.5<--  03-12    139  |  99  |  11  ----------------------------<  86  3.7   |  32<H>  |  0.59    Ca    9.0      12 Mar 2019 09:10  Mg     2.0     03-12      Creatinine Trend: 0.59<--, 0.59<--, 0.75<--, 0.72<--, 0.78<--, 0.91<--

## 2019-03-12 NOTE — PROGRESS NOTE ADULT - SUBJECTIVE AND OBJECTIVE BOX
Patient is a 89y old  Female who presents with a chief complaint of hypercapnic resp failure (12 Mar 2019 12:04)      SUBJECTIVE / OVERNIGHT EVENTS: Patient seen and examined at bedside. No acute events overnight. resting comfortably.     ROS:  unable to obtain due to underling dementia     Allergies    No Known Allergies    Intolerances        MEDICATIONS  (STANDING):  ALBUTerol/ipratropium for Nebulization 3 milliLiter(s) Nebulizer every 6 hours  aspirin  chewable 81 milliGRAM(s) Oral daily  BACItracin   Ointment 1 Application(s) Topical two times a day  dextrose 5%. 1000 milliLiter(s) (50 mL/Hr) IV Continuous <Continuous>  dextrose 5%. 1000 milliLiter(s) (50 mL/Hr) IV Continuous <Continuous>  dextrose 5%. 1000 milliLiter(s) (50 mL/Hr) IV Continuous <Continuous>  dextrose 50% Injectable 12.5 Gram(s) IV Push once  dextrose 50% Injectable 25 Gram(s) IV Push once  dextrose 50% Injectable 25 Gram(s) IV Push once  heparin  Injectable 5000 Unit(s) SubCutaneous every 12 hours  insulin lispro (HumaLOG) corrective regimen sliding scale   SubCutaneous three times a day before meals  insulin lispro (HumaLOG) corrective regimen sliding scale   SubCutaneous at bedtime  metoprolol tartrate Injectable 5 milliGRAM(s) IV Push every 6 hours  petrolatum white Ointment 1 Application(s) Topical two times a day  piperacillin/tazobactam IVPB. 3.375 Gram(s) IV Intermittent every 12 hours  polyethylene glycol 3350 17 Gram(s) Oral daily  senna Syrup 10 milliLiter(s) Oral at bedtime    MEDICATIONS  (PRN):  ALBUTerol    90 MICROgram(s) HFA Inhaler 2 Puff(s) Inhalation every 12 hours PRN asthma  dextrose 40% Gel 15 Gram(s) Oral once PRN Blood Glucose LESS THAN 70 milliGRAM(s)/deciliter  glucagon  Injectable 1 milliGRAM(s) IntraMuscular once PRN Glucose LESS THAN 70 milligrams/deciliter      Vital Signs Last 24 Hrs  T(C): 36.1 (12 Mar 2019 12:15), Max: 36.5 (11 Mar 2019 22:07)  T(F): 96.9 (12 Mar 2019 12:15), Max: 97.7 (11 Mar 2019 22:07)  HR: 68 (12 Mar 2019 12:15) (56 - 72)  BP: 148/85 (12 Mar 2019 12:15) (131/66 - 169/78)  BP(mean): --  RR: 18 (12 Mar 2019 12:15) (18 - 18)  SpO2: 99% (12 Mar 2019 12:15) (96% - 100%)  CAPILLARY BLOOD GLUCOSE      POCT Blood Glucose.: 125 mg/dL (12 Mar 2019 12:47)  POCT Blood Glucose.: 67 mg/dL (12 Mar 2019 12:16)  POCT Blood Glucose.: 68 mg/dL (12 Mar 2019 12:12)  POCT Blood Glucose.: 79 mg/dL (12 Mar 2019 10:59)  POCT Blood Glucose.: 91 mg/dL (12 Mar 2019 04:07)  POCT Blood Glucose.: 198 mg/dL (11 Mar 2019 22:03)  POCT Blood Glucose.: 231 mg/dL (11 Mar 2019 18:21)    I&O's Summary    11 Mar 2019 07:01  -  12 Mar 2019 07:00  --------------------------------------------------------  IN: 1150 mL / OUT: 250 mL / NET: 900 mL    12 Mar 2019 07:01  -  12 Mar 2019 15:06  --------------------------------------------------------  IN: 250 mL / OUT: 200 mL / NET: 50 mL        PHYSICAL EXAM:  GENERAL: NAD, frail   HEAD:  Atraumatic, Normocephalic  EYES: EOMI, PERRLA, conjunctiva and sclera clear  CHEST/LUNG: decreased breath sound right lung.; No wheeze  HEART: Regular rate and rhythm; No murmurs, rubs, or gallops  ABDOMEN: Soft, Nontender, Nondistended; Bowel sounds present  EXTREMITIES:  2+ Peripheral Pulses, No clubbing, cyanosis, or edema  NEUROLOGY: awake and alert , slow to respond to questions   SKIN: No rashes or lesions    LABS:                        11.3   2.5   )-----------( 143      ( 12 Mar 2019 09:19 )             33.0     03-12    139  |  99  |  11  ----------------------------<  86  3.7   |  32<H>  |  0.59    Ca    9.0      12 Mar 2019 09:10  Mg     2.0     03-12                RADIOLOGY & ADDITIONAL TESTS:    Consultant(s) Notes Reviewed:  pulmonary and ID recs noted     Care Discussed with Consultants/Other Providers: Palliative care     Case Discussed with granddaughter Melissa

## 2019-03-13 PROCEDURE — 99232 SBSQ HOSP IP/OBS MODERATE 35: CPT

## 2019-03-13 PROCEDURE — 99233 SBSQ HOSP IP/OBS HIGH 50: CPT

## 2019-03-13 RX ADMIN — PIPERACILLIN AND TAZOBACTAM 25 GRAM(S): 4; .5 INJECTION, POWDER, LYOPHILIZED, FOR SOLUTION INTRAVENOUS at 17:08

## 2019-03-13 RX ADMIN — Medication 5 MILLIGRAM(S): at 17:09

## 2019-03-13 RX ADMIN — PIPERACILLIN AND TAZOBACTAM 25 GRAM(S): 4; .5 INJECTION, POWDER, LYOPHILIZED, FOR SOLUTION INTRAVENOUS at 05:22

## 2019-03-13 RX ADMIN — Medication 1 APPLICATION(S): at 05:23

## 2019-03-13 RX ADMIN — Medication 3 MILLILITER(S): at 12:13

## 2019-03-13 RX ADMIN — Medication 3 MILLILITER(S): at 23:04

## 2019-03-13 RX ADMIN — Medication 5 MILLIGRAM(S): at 23:04

## 2019-03-13 RX ADMIN — Medication 81 MILLIGRAM(S): at 12:13

## 2019-03-13 RX ADMIN — Medication 5 MILLIGRAM(S): at 12:13

## 2019-03-13 RX ADMIN — Medication 1 APPLICATION(S): at 17:12

## 2019-03-13 RX ADMIN — HEPARIN SODIUM 5000 UNIT(S): 5000 INJECTION INTRAVENOUS; SUBCUTANEOUS at 17:09

## 2019-03-13 RX ADMIN — Medication 3 MILLILITER(S): at 17:09

## 2019-03-13 RX ADMIN — Medication 1 APPLICATION(S): at 17:09

## 2019-03-13 RX ADMIN — Medication 5 MILLIGRAM(S): at 05:23

## 2019-03-13 RX ADMIN — Medication 3 MILLILITER(S): at 05:23

## 2019-03-13 RX ADMIN — HEPARIN SODIUM 5000 UNIT(S): 5000 INJECTION INTRAVENOUS; SUBCUTANEOUS at 05:23

## 2019-03-13 NOTE — ADVANCED PRACTICE NURSE CONSULT - REASON FOR CONSULT
Requested by staff to assess skin status: spine and b/l cheeks. PMH is noted:  89F with Alzheimer dementia, Afib, CHF and Asthma admitted 3/4/19 with hypoxic and hypercapnic respiratory failure, was intubated and admitted to MICU, managed for possible CHF and/or pneumonia. s/p thoracentesis for a left effusion 3/6/19, slightly exudative. Extubated and downgraded from MICU.  Course complicated by encephalopathy and multi-organism bacteremia. Found to have RLL necrotizing PNA and colitis on CT abdomen.

## 2019-03-13 NOTE — ADVANCED PRACTICE NURSE CONSULT - RECOMMEDATIONS
Will recommend the followin. B/l cheeks: apply Cavilon daily  2. Spine: Cavilon to periwound skin, Allevyn foam to the wounds- change every 3 days and prn for drainage.  3. z-marly boots to off-load the heels  4. continue with turning and positioning  5. Nutrition support as pt condition allows  Tx plan discussed with RN

## 2019-03-13 NOTE — PROGRESS NOTE ADULT - NSICDXPROBLEM_GEN_ALL_CORE_FT
PROBLEM DIAGNOSES  Problem: Acute respiratory failure with hypercapnia  Assessment and Plan: -S/p extubation in MICU and thoracentesis. CT abdomen shows RLL areas of cavitation, concerning for necrotizing PNA.   -C/w Zosyn for now, per ID recs, last dose  3/18/19  -Now improved on nasal cannula .  -pulmonary rec appraicted   -C/w Duonebs Q6H.  -Airway clearance device, Chest Vest, and Chest PT, and aspiration precautions and elevate HOB. OOB to chair.       Problem: Bacteremia  Assessment and Plan: Found to have Coagulase negative Staph, Enterococcus faecalis, Strep species on earlier cultures. Blood cultures subsequently cleared.   -CT abdomen shows colitis, which likely explains the multi-organism bacteremia.   -C/w Zosyn for now, per ID recs. for 2 week course of abx today (#10/14 days)   -Echo without any obvious vegetations.      Problem: Acute deep vein thrombosis (DVT) of left upper extremity after procedure  Assessment and Plan: DVT LE + with underling chronic Afib   - discussed AC risks and benifits in detail with patient's children and granddaughter,   - they would like to hold off on AC at this time. and c/w supporitve care     Problem: Encephalopathy  Assessment and Plan: Patient also with some acute encephalopathy on chronic dementia. Likely multifactorial due to hospitalization and infection, etc.   -Monitor mental status closely.   -Fall precautions,      Problem: Advanced care planning/counseling discussion  Assessment and Plan: Had long conversation with patient's granddaughter and daughters at bedside. Informed them of patient's guarded prognosis.  - The family do not want feeding tube, would like to pursue comfort feeding   -MOLST completed by them, patient now DNR.   - family meeting [facility] for tomorrow at 10:30 am to discuss GOC and possible hospice, D/w Dr. Zepeda       Problem: Severe protein-calorie malnutrition  Assessment and Plan: Patient unable to participate with swallow eval due to poor participation.   - S&S reevaluated today, due to pt being sleepy and increased work of breathing, unable to assess  -discussed dysphagia in detail with family, they would like to pursue comfort feeding at this time, they have declined NGT, PEG tube.   - comfort feeding started  -FM meeting [facility] tomorrow       Problem: VANIA (acute kidney injury)  Assessment and Plan: resolved monitor BMP     Problem: Prophylactic measure  Assessment and Plan: Heparin SQ for DVT PPx.  -PT eval.  - prognosis guarded      Problem: Chronic atrial fibrillation  Assessment and Plan: Not on AC at home due to age and fall risk.    -C/w ASA when able to take PO.   -C/w IV metoprolo 5mg IV Q6H for elevated BP  -Was on digoxin at home, held here due to elevated level on admission. Level eventually came down. Consider resuming if rate not controlled.       Problem: Pleural effusion  Assessment and Plan: 2/2 necrotizing PNA seen on CT. Culture with no growth but after antibiotics had already been initiated. Respiratory status is improved after tap.  -fluid cytopathology: neg for malignancy   -Monitor strict I's/O's and daily weights.

## 2019-03-13 NOTE — PROGRESS NOTE ADULT - ASSESSMENT
89F with Alzheimer dementia, Afib, CHF and Asthma admitted 3/4/19 with hypoxic and hypercapnic respiratory failure, was intubated and admitted to MICU, managed for possible CHF and/or pneumonia. s/p thoracentesis for a left effusion 3/6/19, slightly exudative. Extubated and downgraded from MICU.   Blood cultures from 3/4/19 have grown Enterococcus faecalis and alpha Strep from one of four bottles. BioFire detected CoNS as well but it has not grown so far - check with micro and they will check again. If a true polymicrobial bacteremia would be concerned for a GI focus. Repeat cultures negative.   BAL and pleural fluid without bacterial growth    Afebrile and nontoxic but had an episode of hypothermia to 94.9F on night of 3/5   CT with colitis most likely source of enterococcus bacteremia  CT chest with cavitation suspicious for necrotizing PNA possibly aspiration PNA covered by zosyn    Recommend  -Continue Zosyn - would cover CT chest findings and enterococcal bacteremia and colitis  -Anticipate a 2-week course of antibiotics for the enterococcus in blood cultures, cavitary PNA  to complete 3/18/19.  -Aspiration precautions    prognosis guarded

## 2019-03-13 NOTE — ADVANCED PRACTICE NURSE CONSULT - ASSESSMENT
The pt is now on 2Monti, currently receiving oxygen via a nasal cannula. On the b/l cheeks in similar areas and presenting in a similar fashion are 2 resolving superficial areas of tissue loss. the areas of tissue loss are <1cm round with a pink wound bed, no drainage, the periwound skin is intact. Recommended that Cavilon be applied to lay down a protective coating on the skin.  On the lumbar spine are 2 wounds -they have a circular pattern, also < 1cm round. the distal wound presents as a dry black scab, the proximal wound is superficial with moist pink skin. the spinal processes  are protuberant and staff have applied an Allevyn foam to cushion will recommend to continue with this same treatment.  Would not classify these wounds as pressure ulcers.   The pt was seen by nutrition and was receiving enteral feeds during the early admission.   A low air-loss surface is in use and the pt is being turned and positioned- will recommend z-marly boots for off-loading. The pt is now on 2Monti, currently receiving oxygen via a nasal cannula. On the b/l cheeks in similar areas and presenting in a similar fashion are 2 resolving superficial areas of tissue loss. the areas of tissue loss are <1cm round with a pink wound bed, no drainage, the periwound skin is intact. Recommended that Cavilon be applied to lay down a protective coating on the skin.  On the lumbar spine are 2 wounds -they have a circular pattern, also < 1cm round. the distal wound presents as a dry black scab, the proximal wound is superficial with moist pink skin. the spinal processes  are protuberant and staff have applied an Allevyn foam to cushion will recommend to continue with this same treatment.  Would not classify these wounds as pressure ulcers.   The pt was seen by nutrition and was receiving enteral feeds during the early admission. Pt was unable to complete a swallow evaluation. As per discussion with the RN, pt is on a dysphagia 1 diet for comfort feeding.   A low air-loss surface is in use and the pt is being turned and positioned- will recommend z-marly boots for off-loading.  Education was provided to the daughter who was sitting at bedside regarding the condition of the skin and treatments used. Translation was done via the daughters cell phone at her request;  the pts granddaughter Archana assisted with communication.

## 2019-03-13 NOTE — PROGRESS NOTE ADULT - SUBJECTIVE AND OBJECTIVE BOX
CC: Patient is a 89y old  Female who presents with a chief complaint of hypercapnic resp failure (12 Mar 2019 15:06)    ID following for bacteremia    Interval History/ROS: Patient remains afebrile. Resting comfortably. States no pain. Remains on NC.     Rest of ROS negative.    Allergies  No Known Allergies    ANTIMICROBIALS:  piperacillin/tazobactam IVPB. 3.375 every 12 hours    OTHER MEDS:  ALBUTerol    90 MICROgram(s) HFA Inhaler 2 Puff(s) Inhalation every 12 hours PRN  ALBUTerol/ipratropium for Nebulization 3 milliLiter(s) Nebulizer every 6 hours  aspirin  chewable 81 milliGRAM(s) Oral daily  BACItracin   Ointment 1 Application(s) Topical two times a day  dextrose 40% Gel 15 Gram(s) Oral once PRN  dextrose 5%. 1000 milliLiter(s) IV Continuous <Continuous>  dextrose 5%. 1000 milliLiter(s) IV Continuous <Continuous>  dextrose 5%. 1000 milliLiter(s) IV Continuous <Continuous>  dextrose 50% Injectable 12.5 Gram(s) IV Push once  dextrose 50% Injectable 25 Gram(s) IV Push once  dextrose 50% Injectable 25 Gram(s) IV Push once  glucagon  Injectable 1 milliGRAM(s) IntraMuscular once PRN  heparin  Injectable 5000 Unit(s) SubCutaneous every 12 hours  insulin lispro (HumaLOG) corrective regimen sliding scale   SubCutaneous three times a day before meals  insulin lispro (HumaLOG) corrective regimen sliding scale   SubCutaneous at bedtime  metoprolol tartrate Injectable 5 milliGRAM(s) IV Push every 6 hours  petrolatum white Ointment 1 Application(s) Topical two times a day  polyethylene glycol 3350 17 Gram(s) Oral daily  senna Syrup 10 milliLiter(s) Oral at bedtime    PE:    Vital Signs Last 24 Hrs  T(C): 36.4 (13 Mar 2019 09:32), Max: 36.8 (13 Mar 2019 05:24)  T(F): 97.6 (13 Mar 2019 09:32), Max: 98.3 (13 Mar 2019 05:24)  HR: 71 (13 Mar 2019 09:32) (62 - 80)  BP: 163/74 (13 Mar 2019 09:32) (148/85 - 178/74)  BP(mean): --  RR: 20 (13 Mar 2019 09:32) (18 - 20)  SpO2: 96% (13 Mar 2019 09:32) (96% - 100%)    Gen: Awake, alert, NAD  CV: S1+S2 normal, no murmurs  Resp: Clear bilat, no resp distress  Abd: Soft, nontender, +BS  Ext: Mild foot swelling, no wounds  : No Sawant  IV/Skin: No thrombophlebitis  Neuro: no focal deficits    LABS:                          11.3   2.5   )-----------( 143      ( 12 Mar 2019 09:19 )             33.0       03-12    139  |  99  |  11  ----------------------------<  86  3.7   |  32<H>  |  0.59    Ca    9.0      12 Mar 2019 09:10  Mg     2.0     03-12    MICROBIOLOGY:  v  .Body Fluid Pleural Fluid  03-06-19   Culture is being performed.  --    No polymorphonuclear cells seen  No organisms seen  by cytocentrifuge    .Blood Blood  03-06-19   No growth at 5 days.  --  --    .Sputum Sputum  03-05-19   Culture is being performed.  --  --    Bronch Wash Combicath  03-04-19   Normal Respiratory Anna present  --    Numerous polymorphonuclear leukocytes per low power field  Rare Squamous Epithelial Cells per low power field  Few Gram Variable Rods per oil power field  Rare Gram positive cocci in pairs per oil power field    .Urine Catheterized  03-04-19   No growth  --  --    .Blood Blood-Peripheral  03-04-19   Growth in aerobic bottle: Enterococcus faecalis  Growth in aerobic bottle: Staphylococcus epidermidis "Susceptibilities  not performed"  Growth in aerobic bottle: Streptococcus salivarius "Susceptibilities not  performed"  "Due to technical problems, Proteus sp. will Not be reported as part of  the BCID panel until further notice"  ***Blood Panel PCR results on this specimen are available  approximately 3 hours after the Gram stain result.***  Gram stain, PCR, and/or culture results may not always  correspond due to difference in methodologies.  ************************************************************  This PCR assay was performed using [employer].  The following targets are tested for: Enterococcus,  vancomycin resistant enterococci, Listeria monocytogenes,  coagulase negative staphylococci, S. aureus,  methicillin resistant S. aureus, Streptococcus agalactiae  (Group B), S. pneumoniae, S. pyogenes (Group A),  Acinetobacter baumannii, Enterobacter cloacae, E. coli,  Klebsiella oxytoca, K. pneumoniae, Proteus sp.,  Serratia marcescens, Haemophilus influenzae,  Neisseria meningitidis, Pseudomonas aeruginosa, Candida  albicans, C. glabrata, C krusei, C parapsilosis,  C. tropicalis and the KPC resistance gene.  --  Blood Culture PCR  Enterococcus faecalis    RADIOLOGY:  < from: [facility] Duplex Upper Ext Vein Scan, Bilat (03.12.19 @ 15:15) >  Impression: On the left, the basilic vein, a superficial vein, is   thrombosed, with the thrombus extending into and occluded the axillary   vein at the confluence of the 2 veins.    Superficial thrombophlebitis affects the right cephalic vein.       < end of copied text >

## 2019-03-13 NOTE — PROGRESS NOTE ADULT - SUBJECTIVE AND OBJECTIVE BOX
Patient is a 89y old  Female who presents with a chief complaint of hypercapnic resp failure (13 Mar 2019 10:57)      SUBJECTIVE / OVERNIGHT EVENTS: Patient seen and examined at bedside. Is more alert today, responds to verbal commands, appears comfortable.     ROS:  unable to obtain due to dementia     Allergies    No Known Allergies    Intolerances        MEDICATIONS  (STANDING):  ALBUTerol/ipratropium for Nebulization 3 milliLiter(s) Nebulizer every 6 hours  aspirin  chewable 81 milliGRAM(s) Oral daily  BACItracin   Ointment 1 Application(s) Topical two times a day  dextrose 5%. 1000 milliLiter(s) (50 mL/Hr) IV Continuous <Continuous>  dextrose 5%. 1000 milliLiter(s) (50 mL/Hr) IV Continuous <Continuous>  dextrose 5%. 1000 milliLiter(s) (50 mL/Hr) IV Continuous <Continuous>  dextrose 50% Injectable 12.5 Gram(s) IV Push once  dextrose 50% Injectable 25 Gram(s) IV Push once  dextrose 50% Injectable 25 Gram(s) IV Push once  heparin  Injectable 5000 Unit(s) SubCutaneous every 12 hours  insulin lispro (HumaLOG) corrective regimen sliding scale   SubCutaneous three times a day before meals  insulin lispro (HumaLOG) corrective regimen sliding scale   SubCutaneous at bedtime  metoprolol tartrate Injectable 5 milliGRAM(s) IV Push every 6 hours  petrolatum white Ointment 1 Application(s) Topical two times a day  piperacillin/tazobactam IVPB. 3.375 Gram(s) IV Intermittent every 12 hours  polyethylene glycol 3350 17 Gram(s) Oral daily  senna Syrup 10 milliLiter(s) Oral at bedtime    MEDICATIONS  (PRN):  ALBUTerol    90 MICROgram(s) HFA Inhaler 2 Puff(s) Inhalation every 12 hours PRN asthma  dextrose 40% Gel 15 Gram(s) Oral once PRN Blood Glucose LESS THAN 70 milliGRAM(s)/deciliter  glucagon  Injectable 1 milliGRAM(s) IntraMuscular once PRN Glucose LESS THAN 70 milligrams/deciliter      Vital Signs Last 24 Hrs  T(C): 36.4 (13 Mar 2019 12:11), Max: 36.8 (13 Mar 2019 05:24)  T(F): 97.5 (13 Mar 2019 12:11), Max: 98.3 (13 Mar 2019 05:24)  HR: 84 (13 Mar 2019 12:11) (62 - 84)  BP: 172/78 (13 Mar 2019 12:11) (153/65 - 178/74)  BP(mean): --  RR: 20 (13 Mar 2019 12:11) (18 - 20)  SpO2: 94% (13 Mar 2019 12:11) (94% - 100%)  CAPILLARY BLOOD GLUCOSE      POCT Blood Glucose.: 128 mg/dL (13 Mar 2019 12:02)  POCT Blood Glucose.: 138 mg/dL (13 Mar 2019 10:50)  POCT Blood Glucose.: 141 mg/dL (12 Mar 2019 22:20)  POCT Blood Glucose.: 120 mg/dL (12 Mar 2019 16:29)    I&O's Summary    12 Mar 2019 07:01  -  13 Mar 2019 07:00  --------------------------------------------------------  IN: 1050 mL / OUT: 500 mL / NET: 550 mL        PHYSICAL EXAM:  GENERAL: NAD, frail   HEAD:  Atraumatic, Normocephalic  EYES: EOMI, PERRLA, conjunctiva and sclera clear  CHEST/LUNG: decreased breath sound right lung.; No wheeze  HEART: Regular rate and rhythm; No murmurs, rubs, or gallops  ABDOMEN: Soft, Nontender, Nondistended; Bowel sounds present  EXTREMITIES:  2+ Peripheral Pulses, No clubbing, cyanosis, or edema  NEUROLOGY: awake and alert , slow to respond to questions   SKIN: No rashes or lesions    LABS:                        11.3   2.5   )-----------( 143      ( 12 Mar 2019 09:19 )             33.0     03-12    139  |  99  |  11  ----------------------------<  86  3.7   |  32<H>  |  0.59    Ca    9.0      12 Mar 2019 09:10  Mg     2.0     03-12                RADIOLOGY & ADDITIONAL TESTS:    Consultant(s) Notes Reviewed:  ID recs noted     Care Discussed with Consultants/Other Providers: Palliative care ( Dr. Zepeda) family meeting [facility] for tomorrow at 10:30 to discuss GOC and possible hospice      Case Discussed with Dtr at bedside and Ebonie Torres     Goals of Care: DNR/DNI and no feeding Tube

## 2019-03-14 DIAGNOSIS — R13.10 DYSPHAGIA, UNSPECIFIED: ICD-10-CM

## 2019-03-14 DIAGNOSIS — F03.90 UNSPECIFIED DEMENTIA WITHOUT BEHAVIORAL DISTURBANCE: ICD-10-CM

## 2019-03-14 DIAGNOSIS — R06.4 HYPERVENTILATION: ICD-10-CM

## 2019-03-14 LAB
ANION GAP SERPL CALC-SCNC: 10 MMOL/L — SIGNIFICANT CHANGE UP (ref 5–17)
BUN SERPL-MCNC: 11 MG/DL — SIGNIFICANT CHANGE UP (ref 7–23)
CALCIUM SERPL-MCNC: 9.3 MG/DL — SIGNIFICANT CHANGE UP (ref 8.4–10.5)
CHLORIDE SERPL-SCNC: 100 MMOL/L — SIGNIFICANT CHANGE UP (ref 96–108)
CO2 SERPL-SCNC: 32 MMOL/L — HIGH (ref 22–31)
CREAT SERPL-MCNC: 0.62 MG/DL — SIGNIFICANT CHANGE UP (ref 0.5–1.3)
GLUCOSE SERPL-MCNC: 154 MG/DL — HIGH (ref 70–99)
POTASSIUM SERPL-MCNC: 3.9 MMOL/L — SIGNIFICANT CHANGE UP (ref 3.5–5.3)
POTASSIUM SERPL-SCNC: 3.9 MMOL/L — SIGNIFICANT CHANGE UP (ref 3.5–5.3)
SODIUM SERPL-SCNC: 142 MMOL/L — SIGNIFICANT CHANGE UP (ref 135–145)

## 2019-03-14 PROCEDURE — 99497 ADVNCD CARE PLAN 30 MIN: CPT

## 2019-03-14 PROCEDURE — 99497 ADVNCD CARE PLAN 30 MIN: CPT | Mod: 25

## 2019-03-14 PROCEDURE — 99358 PROLONG SERVICE W/O CONTACT: CPT

## 2019-03-14 PROCEDURE — 99233 SBSQ HOSP IP/OBS HIGH 50: CPT

## 2019-03-14 PROCEDURE — 99232 SBSQ HOSP IP/OBS MODERATE 35: CPT

## 2019-03-14 RX ORDER — INSULIN HUMAN 100 [IU]/ML
INJECTION, SOLUTION SUBCUTANEOUS EVERY 6 HOURS
Qty: 0 | Refills: 0 | Status: DISCONTINUED | OUTPATIENT
Start: 2019-03-14 | End: 2019-03-14

## 2019-03-14 RX ORDER — SODIUM CHLORIDE 9 MG/ML
1000 INJECTION INTRAMUSCULAR; INTRAVENOUS; SUBCUTANEOUS
Qty: 0 | Refills: 0 | Status: DISCONTINUED | OUTPATIENT
Start: 2019-03-14 | End: 2019-03-18

## 2019-03-14 RX ORDER — INSULIN LISPRO 100/ML
VIAL (ML) SUBCUTANEOUS EVERY 6 HOURS
Qty: 0 | Refills: 0 | Status: DISCONTINUED | OUTPATIENT
Start: 2019-03-14 | End: 2019-03-17

## 2019-03-14 RX ADMIN — Medication 3 MILLILITER(S): at 17:39

## 2019-03-14 RX ADMIN — Medication 1 APPLICATION(S): at 05:14

## 2019-03-14 RX ADMIN — Medication 1 APPLICATION(S): at 17:41

## 2019-03-14 RX ADMIN — Medication 3 MILLILITER(S): at 05:14

## 2019-03-14 RX ADMIN — SODIUM CHLORIDE 50 MILLILITER(S): 9 INJECTION INTRAMUSCULAR; INTRAVENOUS; SUBCUTANEOUS at 17:34

## 2019-03-14 RX ADMIN — Medication 1 APPLICATION(S): at 17:39

## 2019-03-14 RX ADMIN — PIPERACILLIN AND TAZOBACTAM 25 GRAM(S): 4; .5 INJECTION, POWDER, LYOPHILIZED, FOR SOLUTION INTRAVENOUS at 05:11

## 2019-03-14 RX ADMIN — Medication 3 MILLILITER(S): at 12:35

## 2019-03-14 RX ADMIN — HEPARIN SODIUM 5000 UNIT(S): 5000 INJECTION INTRAVENOUS; SUBCUTANEOUS at 05:14

## 2019-03-14 RX ADMIN — SENNA PLUS 10 MILLILITER(S): 8.6 TABLET ORAL at 21:10

## 2019-03-14 RX ADMIN — PIPERACILLIN AND TAZOBACTAM 25 GRAM(S): 4; .5 INJECTION, POWDER, LYOPHILIZED, FOR SOLUTION INTRAVENOUS at 17:35

## 2019-03-14 RX ADMIN — HEPARIN SODIUM 5000 UNIT(S): 5000 INJECTION INTRAVENOUS; SUBCUTANEOUS at 17:40

## 2019-03-14 RX ADMIN — Medication 5 MILLIGRAM(S): at 12:35

## 2019-03-14 RX ADMIN — Medication 5 MILLIGRAM(S): at 17:40

## 2019-03-14 RX ADMIN — Medication 1 APPLICATION(S): at 05:10

## 2019-03-14 RX ADMIN — Medication 81 MILLIGRAM(S): at 12:35

## 2019-03-14 NOTE — PROGRESS NOTE ADULT - NSICDXPROBLEM_GEN_ALL_CORE_FT
PROBLEM DIAGNOSES  Problem: Labored breathing  Assessment and Plan: Work up as per primary.   If distressful symptoms, may consider Morpine 0.5 to 1 mg IV q 3 PRN     Problem: Advanced dementia  Assessment and Plan: FAST     Problem: Dysphagia  Assessment and Plan: Pointed Surrogate already d/w primary team and agree on pleassure feeds.   Aspiration precautions. PROBLEM DIAGNOSES  Problem: Labored breathing  Assessment and Plan: Work up as per primary.   If distressful symptoms, may consider Morpine 0.5 to 1 mg IV q 3 PRN     Problem: Palliative care encounter  Assessment and Plan:     Problem: Advanced dementia  Assessment and Plan: FAST 7 C.   I called the patient's children and all of them answer but [place] and [place]. The children that answered indicated that Chantel Massey should be point person for decision making. Chantel Massey also indicated all her siblings have a discussion and they agree on goals guided toward being sure the patient is comfortable. Chantel Massey agree with a hospice referral for home hospice.       Problem: Dysphagia  Assessment and Plan: Appointed Surrogate already d/w primary team and agree on pleassure feeds.   Aspiration precautions.     Problem: Encounter for palliative care  Assessment and Plan: Patient is already DNR. PROBLEM DIAGNOSES  Problem: Labored breathing  Assessment and Plan: Work up as per primary.   If distressful symptoms, may consider Morpine 0.5 to 1 mg IV q 3 PRN     Problem: Advanced dementia  Assessment and Plan: FAST 7 C.   I called the patient's children and all of them answer but [place] and [place]. The children that answered indicated that Chantel Massey should be point person for decision making. Chantel Massey also indicated all her siblings have a discussion and they agree on goals guided toward being sure the patient is comfortable. Chantel Massey agree with a hospice referral for home hospice.       Problem: Dysphagia  Assessment and Plan: Appointed Surrogate already d/w primary team and agree on pleassure feeds.   Aspiration precautions.     Problem: Encounter for palliative care  Assessment and Plan: Patient is already DNR. PROBLEM DIAGNOSES  Problem: Labored breathing  Assessment and Plan: Work up as per primary.   If distressful symptoms, may consider Morpine 0.5 to 1 mg IV q 3 PRN     Problem: Advanced dementia  Assessment and Plan: FAST 7 C.   I called the patient's children and all of them answer but [place] and [place]. The children that answered indicated that Chantel Massey should be point person for decision making. Chantel Massey also indicated all her siblings have a discussion and they agree on goals guided toward being sure the patient is comfortable. Chantel Massey agree with a hospice referral for home hospice. 16' spent in ACP  20' were spent in Non face to face time during this encounter. Time in 17:00 time out 17:20      Problem: Dysphagia  Assessment and Plan: Appointed Surrogate already d/w primary team and agree on pleassure feeds.   Aspiration precautions.     Problem: Encounter for palliative care  Assessment and Plan: Patient is already DNR.

## 2019-03-14 NOTE — CHART NOTE - NSCHARTNOTEFT_GEN_A_CORE
Upon Nutritional Assessment by the Registered Dietitian your patient was determined to meet criteria / has evidence of the following diagnosis/diagnoses:          [ ]  Mild Protein Calorie Malnutrition        [ ]  Moderate Protein Calorie Malnutrition        [X ] Severe Protein Calorie Malnutrition, acute        [ ] Unspecified Protein Calorie Malnutrition        [ ] Underweight / BMI <19        [ ] Morbid Obesity / BMI > 40      Findings as based on:  [ X] Comprehensive nutrition assessment   [ ] Nutrition Focused Physical Exam  [X ] Other: Pt with prolonged suboptimal PO intake (<50% estimated nutrient needs x 1 week), moderate muscle wasting (temples, clavicle)      Nutrition Plan/Recommendations:      1) Continue with current diet as tolerated per pt/family GOC- Pt's daughter-in-law declined need for additional snacks/ nutritional supplements as she brings food from outside  2) RD to follow up for further nutritional interventions as indicated/requested by medical team/pt.     Lydia Davidson RD pager #[phone]     PROVIDER Section:     By signing this assessment you are acknowledging and agree with the diagnosis/diagnoses assigned by the Registered Dietitian    Comments:

## 2019-03-14 NOTE — CHART NOTE - NSCHARTNOTEFT_GEN_A_CORE
Nutrition Follow Up Note      Source: medical record, pt's daughter-in-law at bedside, RN    Diet : Dysphagia 1, Nectar consistency fluid     Patient seen for nutrition follow up. Medical chart reviewed/events noted. Pt is DNR/DNI followed by palliative. Pt nonverbal, unable to obtain subjective information. Pt was previously receiving Glucerna 1.2 @ 40mlhr x 18 hours in MICU (3/5-3/6). Pt's family is refusing enteral nutrition via PEG or NGT. Pt unable to participate in speech and swallow evaluation, now on comfort feeds since 3/11. Pending family meeting to discuss GOC. Per pt's daughter-in-law pt only able to consume a few spoonfuls of applesauce. Pt now with inadequate PO intake x 1 week. Pt's daughter in law declined need for additional snacks/ nutritional supplements as she brings food from outside. Unable to perform nutrition focused physical exam as pt sleeping, however pt noted to have visual signs of moderate muscle wasting (temples, clavicle).       Daily Weight in k (-13), Weight in k.8 (-10), Weight in k.6 (-), Weight in k.1 (-)- per previous RD note, pt's UBW noted to be 90 pounds (41Kg).     Pertinent Medications: MEDICATIONS  (STANDING):  ALBUTerol/ipratropium for Nebulization 3 milliLiter(s) Nebulizer every 6 hours  aspirin  chewable 81 milliGRAM(s) Oral daily  BACItracin   Ointment 1 Application(s) Topical two times a day  dextrose 5%. 1000 milliLiter(s) (50 mL/Hr) IV Continuous <Continuous>  dextrose 5%. 1000 milliLiter(s) (50 mL/Hr) IV Continuous <Continuous>  dextrose 5%. 1000 milliLiter(s) (50 mL/Hr) IV Continuous <Continuous>  dextrose 50% Injectable 12.5 Gram(s) IV Push once  dextrose 50% Injectable 25 Gram(s) IV Push once  dextrose 50% Injectable 25 Gram(s) IV Push once  heparin  Injectable 5000 Unit(s) SubCutaneous every 12 hours  insulin lispro (HumaLOG) corrective regimen sliding scale   SubCutaneous at bedtime  insulin lispro (HumaLOG) corrective regimen sliding scale.   SubCutaneous every 6 hours  metoprolol tartrate Injectable 5 milliGRAM(s) IV Push every 6 hours  petrolatum white Ointment 1 Application(s) Topical two times a day  piperacillin/tazobactam IVPB. 3.375 Gram(s) IV Intermittent every 12 hours  polyethylene glycol 3350 17 Gram(s) Oral daily  senna Syrup 10 milliLiter(s) Oral at bedtime    MEDICATIONS  (PRN):  ALBUTerol    90 MICROgram(s) HFA Inhaler 2 Puff(s) Inhalation every 12 hours PRN asthma  dextrose 40% Gel 15 Gram(s) Oral once PRN Blood Glucose LESS THAN 70 milliGRAM(s)/deciliter  glucagon  Injectable 1 milliGRAM(s) IntraMuscular once PRN Glucose LESS THAN 70 milligrams/deciliter    Finger Sticks:  POCT Blood Glucose.: 131 mg/dL ( @ 12:08)  POCT Blood Glucose.: 134 mg/dL ( @ 08:29)  POCT Blood Glucose.: 117 mg/dL ( @ 21:22)  POCT Blood Glucose.: 111 mg/dL ( @ 16:57)      Skin per nursing documentation:  no pressure ulcers   Edema: +1 left arm edema     Estimated Needs:   [ X] no change since previous assessment  [ ] recalculated:     Previous Nutrition Diagnosis: N/A       New Nutrition Diagnosis: Severe malnutrition, acute  Related to: inability to consume adequate nutrition 2/2 current medical condition   As evidenced by: Pt with prolonged suboptimal PO intake (<50% estimated nutrient needs x 1 week), moderate muscle wasting noted above         Recommend  1) Continue with current diet as tolerated per pt/family GOC  2) RD to follow up for further nutritional interventions as indicated/requested by medical team/pt.     Monitoring and Evaluation:     Continue to monitor Nutritional intake, Tolerance to diet prescription, weights, labs, skin integrity    RD remains available upon request and will follow up per protocol        Lydia Davidson RD pager #[phone]

## 2019-03-14 NOTE — PROGRESS NOTE ADULT - NSICDXPROBLEM_GEN_ALL_CORE_FT
PROBLEM DIAGNOSES  Problem: Acute respiratory failure with hypercapnia  Assessment and Plan: -S/p extubation in MICU and thoracentesis. CT abdomen shows RLL areas of cavitation, concerning for necrotizing PNA.   -C/w Zosyn for now, per ID recs, last dose  3/18/19  -Now improved on nasal cannula .  -pulmonary rec appraicted   -C/w Duonebs Q6H.  -Airway clearance device, Chest Vest, and Chest PT, and aspiration precautions and elevate HOB. OOB to chair.       Problem: Bacteremia  Assessment and Plan: Found to have Coagulase negative Staph, Enterococcus faecalis, Strep species on earlier cultures. Blood cultures subsequently cleared.   -CT abdomen shows colitis, which likely explains the multi-organism bacteremia.   -C/w Zosyn for now, per ID recs. for 2 week course of abx today (#11/14 days)   -Echo without any obvious vegetations.      Problem: Acute deep vein thrombosis (DVT) of left upper extremity after procedure  Assessment and Plan: DVT LE + with underling chronic Afib   - discussed AC risks and benifits in detail with patient's children and granddaughter,   - they would like to hold off on AC at this time. and c/w supporitve care     Problem: Encephalopathy  Assessment and Plan: Patient also with some acute encephalopathy on chronic dementia. Likely multifactorial due to hospitalization and infection, etc.   -Monitor mental status closely.   -Fall precautions,      Problem: Advanced care planning/counseling discussion  Assessment and Plan: Had long conversation with patient's granddaughter and daughters at bedside. Informed them of patient's guarded prognosis.  - The family do not want feeding tube, would like to pursue comfort feeding   -MOLST completed by them, patient now DNR.   - s/p family meeting today, family agreeable to Hospice, Hospice eval placed. palliatve consult appreacited        Problem: Severe protein-calorie malnutrition  Assessment and Plan: Patient unable to participate with swallow eval due to poor participation.   - S&S reevaluated today, due to pt being sleepy and increased work of breathing, unable to assess  -discussed dysphagia in detail with family, they would like to pursue comfort feeding at this time, they have declined NGT, PEG tube.   - comfort feeding started  - meeting [facility] tomorrow       Problem: VANIA (acute kidney injury)  Assessment and Plan: resolved monitor BMP     Problem: Prophylactic measure  Assessment and Plan: Heparin SQ for DVT PPx.  -PT eval.  - prognosis guarded      Problem: Chronic atrial fibrillation  Assessment and Plan: Not on AC at home due to age and fall risk.    -C/w ASA when able to take PO.   -C/w IV metoprolo 5mg IV Q6H for elevated BP  -Was on digoxin at home, held here due to elevated level on admission. Level eventually came down. Consider resuming if rate not controlled.       Problem: Pleural effusion  Assessment and Plan: 2/2 necrotizing PNA seen on CT. Culture with no growth but after antibiotics had already been initiated. Respiratory status is improved after tap.  -fluid cytopathology: neg for malignancy   -Monitor strict I's/O's and daily weights.

## 2019-03-14 NOTE — PROGRESS NOTE ADULT - SUBJECTIVE AND OBJECTIVE BOX
CC: Patient is a 89y old  Female who presents with a chief complaint of hypercapnic resp failure (13 Mar 2019 13:10)    ID following for bacteremia    Interval History/ROS: Patient resting comfortably. Afebrile.    Rest of ROS negative.    Allergies  No Known Allergies    ANTIMICROBIALS:  piperacillin/tazobactam IVPB. 3.375 every 12 hours    OTHER MEDS:  ALBUTerol    90 MICROgram(s) HFA Inhaler 2 Puff(s) Inhalation every 12 hours PRN  ALBUTerol/ipratropium for Nebulization 3 milliLiter(s) Nebulizer every 6 hours  aspirin  chewable 81 milliGRAM(s) Oral daily  BACItracin   Ointment 1 Application(s) Topical two times a day  dextrose 40% Gel 15 Gram(s) Oral once PRN  dextrose 5%. 1000 milliLiter(s) IV Continuous <Continuous>  dextrose 5%. 1000 milliLiter(s) IV Continuous <Continuous>  dextrose 5%. 1000 milliLiter(s) IV Continuous <Continuous>  dextrose 50% Injectable 12.5 Gram(s) IV Push once  dextrose 50% Injectable 25 Gram(s) IV Push once  dextrose 50% Injectable 25 Gram(s) IV Push once  glucagon  Injectable 1 milliGRAM(s) IntraMuscular once PRN  heparin  Injectable 5000 Unit(s) SubCutaneous every 12 hours  insulin lispro (HumaLOG) corrective regimen sliding scale   SubCutaneous at bedtime  insulin lispro (HumaLOG) corrective regimen sliding scale.   SubCutaneous every 6 hours  metoprolol tartrate Injectable 5 milliGRAM(s) IV Push every 6 hours  petrolatum white Ointment 1 Application(s) Topical two times a day  polyethylene glycol 3350 17 Gram(s) Oral daily  senna Syrup 10 milliLiter(s) Oral at bedtime    PE:    Vital Signs Last 24 Hrs  T(C): 36.4 (14 Mar 2019 09:25), Max: 37.1 (14 Mar 2019 05:34)  T(F): 97.5 (14 Mar 2019 09:25), Max: 98.7 (14 Mar 2019 05:34)  HR: 81 (14 Mar 2019 09:25) (72 - 84)  BP: 152/80 (14 Mar 2019 09:25) (112/68 - 173/82)  BP(mean): --  RR: 20 (14 Mar 2019 09:25) (18 - 20)  SpO2: 100% (14 Mar 2019 09:25) (90% - 100%)    Gen: AOx3, NAD, non-toxic, pleasant  CV: S1+S2 normal, no murmurs, nontachycardic  Resp: Clear bilat, no resp distress, no crackles/wheezes  Abd: Soft, nontender, +BS  Ext: No LE edema, no wounds  : No Sawant  IV/Skin: No thrombophlebitis  Neuro: no focal deficits    LABS:                    MICROBIOLOGY:  v  .Body Fluid Pleural Fluid  03-06-19   No growth at 1 week.  --    No polymorphonuclear cells seen  No organisms seen  by cytocentrifuge      .Blood Blood  03-06-19   No growth at 5 days.  --  --      .Sputum Sputum  03-05-19   No growth at 1 week.  --  --      Bronch Wash Combicath  03-04-19   Normal Respiratory Anna present  --    Numerous polymorphonuclear leukocytes per low power field  Rare Squamous Epithelial Cells per low power field  Few Gram Variable Rods per oil power field  Rare Gram positive cocci in pairs per oil power field      .Urine Catheterized  03-04-19   No growth  --  --      .Blood Blood-Peripheral  03-04-19   Growth in aerobic bottle: Enterococcus faecalis  Growth in aerobic bottle: Staphylococcus epidermidis "Susceptibilities  not performed"  Growth in aerobic bottle: Streptococcus salivarius "Susceptibilities not  performed"  "Due to technical problems, Proteus sp. will Not be reported as part of  the BCID panel until further notice"  ***Blood Panel PCR results on this specimen are available  approximately 3 hours after the Gram stain result.***  Gram stain, PCR, and/or culture results may not always  correspond due to difference in methodologies.  ************************************************************  This PCR assay was performed using [employer].  The following targets are tested for: Enterococcus,  vancomycin resistant enterococci, Listeria monocytogenes,  coagulase negative staphylococci, S. aureus,  methicillin resistant S. aureus, Streptococcus agalactiae  (Group B), S. pneumoniae, S. pyogenes (Group A),  Acinetobacter baumannii, Enterobacter cloacae, E. coli,  Klebsiella oxytoca, K. pneumoniae, Proteus sp.,  Serratia marcescens, Haemophilus influenzae,  Neisseria meningitidis, Pseudomonas aeruginosa, Candida  albicans, C. glabrata, C krusei, C parapsilosis,  C. tropicalis and the KPC resistance gene.  --  Blood Culture PCR  Enterococcus faecalis    RADIOLOGY:    < from: [facility] Duplex Upper Ext Vein Scan, Bilat (03.12.19 @ 15:15) >  Impression: On the left, the basilic vein, a superficial vein, is   thrombosed, with the thrombus extending into and occluded the axillary   vein at the confluence of the 2 veins.    Superficial thrombophlebitis affects the right cephalic vein.     < end of copied text > CC: Patient is a 89y old  Female who presents with a chief complaint of hypercapnic resp failure (13 Mar 2019 13:10)    ID following for bacteremia    Interval History/ROS: Patient resting comfortably. Afebrile.    Rest of ROS negative.    Allergies  No Known Allergies    ANTIMICROBIALS:  piperacillin/tazobactam IVPB. 3.375 every 12 hours    OTHER MEDS:  ALBUTerol    90 MICROgram(s) HFA Inhaler 2 Puff(s) Inhalation every 12 hours PRN  ALBUTerol/ipratropium for Nebulization 3 milliLiter(s) Nebulizer every 6 hours  aspirin  chewable 81 milliGRAM(s) Oral daily  BACItracin   Ointment 1 Application(s) Topical two times a day  dextrose 40% Gel 15 Gram(s) Oral once PRN  dextrose 5%. 1000 milliLiter(s) IV Continuous <Continuous>  dextrose 5%. 1000 milliLiter(s) IV Continuous <Continuous>  dextrose 5%. 1000 milliLiter(s) IV Continuous <Continuous>  dextrose 50% Injectable 12.5 Gram(s) IV Push once  dextrose 50% Injectable 25 Gram(s) IV Push once  dextrose 50% Injectable 25 Gram(s) IV Push once  glucagon  Injectable 1 milliGRAM(s) IntraMuscular once PRN  heparin  Injectable 5000 Unit(s) SubCutaneous every 12 hours  insulin lispro (HumaLOG) corrective regimen sliding scale   SubCutaneous at bedtime  insulin lispro (HumaLOG) corrective regimen sliding scale.   SubCutaneous every 6 hours  metoprolol tartrate Injectable 5 milliGRAM(s) IV Push every 6 hours  petrolatum white Ointment 1 Application(s) Topical two times a day  polyethylene glycol 3350 17 Gram(s) Oral daily  senna Syrup 10 milliLiter(s) Oral at bedtime    PE:    Vital Signs Last 24 Hrs  T(C): 36.4 (14 Mar 2019 09:25), Max: 37.1 (14 Mar 2019 05:34)  T(F): 97.5 (14 Mar 2019 09:25), Max: 98.7 (14 Mar 2019 05:34)  HR: 81 (14 Mar 2019 09:25) (72 - 84)  BP: 152/80 (14 Mar 2019 09:25) (112/68 - 173/82)  BP(mean): --  RR: 20 (14 Mar 2019 09:25) (18 - 20)  SpO2: 100% (14 Mar 2019 09:25) (90% - 100%)    Gen: Awake, alert, NAD  CV: S1+S2 normal, no murmurs  Resp: Clear bilat, no resp distress  Abd: Soft, nontender, +BS  Ext: no wounds  : No Sawant  IV/Skin: No thrombophlebitis  Neuro: no focal deficits    LABS:    MICROBIOLOGY:  v  .Body Fluid Pleural Fluid  03-06-19   No growth at 1 week.  --    No polymorphonuclear cells seen  No organisms seen  by cytocentrifuge      .Blood Blood  03-06-19   No growth at 5 days.  --  --      .Sputum Sputum  03-05-19   No growth at 1 week.  --  --      Bronch Wash Combicath  03-04-19   Normal Respiratory Anna present  --    Numerous polymorphonuclear leukocytes per low power field  Rare Squamous Epithelial Cells per low power field  Few Gram Variable Rods per oil power field  Rare Gram positive cocci in pairs per oil power field      .Urine Catheterized  03-04-19   No growth  --  --      .Blood Blood-Peripheral  03-04-19   Growth in aerobic bottle: Enterococcus faecalis  Growth in aerobic bottle: Staphylococcus epidermidis "Susceptibilities  not performed"  Growth in aerobic bottle: Streptococcus salivarius "Susceptibilities not  performed"  "Due to technical problems, Proteus sp. will Not be reported as part of  the BCID panel until further notice"  ***Blood Panel PCR results on this specimen are available  approximately 3 hours after the Gram stain result.***  Gram stain, PCR, and/or culture results may not always  correspond due to difference in methodologies.  ************************************************************  This PCR assay was performed using [employer].  The following targets are tested for: Enterococcus,  vancomycin resistant enterococci, Listeria monocytogenes,  coagulase negative staphylococci, S. aureus,  methicillin resistant S. aureus, Streptococcus agalactiae  (Group B), S. pneumoniae, S. pyogenes (Group A),  Acinetobacter baumannii, Enterobacter cloacae, E. coli,  Klebsiella oxytoca, K. pneumoniae, Proteus sp.,  Serratia marcescens, Haemophilus influenzae,  Neisseria meningitidis, Pseudomonas aeruginosa, Candida  albicans, C. glabrata, C krusei, C parapsilosis,  C. tropicalis and the KPC resistance gene.  --  Blood Culture PCR  Enterococcus faecalis    RADIOLOGY:    < from: [facility] Duplex Upper Ext Vein Scan, Bilat (03.12.19 @ 15:15) >  Impression: On the left, the basilic vein, a superficial vein, is   thrombosed, with the thrombus extending into and occluded the axillary   vein at the confluence of the 2 veins.    Superficial thrombophlebitis affects the right cephalic vein.     < end of copied text >

## 2019-03-14 NOTE — PROGRESS NOTE ADULT - SUBJECTIVE AND OBJECTIVE BOX
HPI: 89F with PMH of Alzheimer's, HTN, AF ( not on AC), CHF (last documented EF from 2015 of 62%), asthma, cholecystectomy who presented to ED with progressive lethargy and dyspnea over a 3-4 day period, and worsening LE edema. Found to have hypercapnic respiratory failure and intubated; also underwent thoracentesis (3/6) for L sided effusion, likely related to HFrEF exacerbation +/- PNA. Was extubated, transitioned to BiPAP and transferred to medicine. Failed S&S evaluation 3/8. Per family, patient has been declining slowly over the past few months, with less ambulation and less PO intake. Palliative was consulted to help discuss overall GOC.     INTERVAL EVENTS: Patient was seen and examined with her daughters (Chantel Massey, Arthur Massey), granddaughter (Melissa), and daughter in law (Archana) by her bedside. The patient was restless. She did not appear to be on any pain. However, she appeared to be having mild labored breathing.     PAST MEDICAL & SURGICAL HISTORY:  Arthritis  Asthma  Diabetes  Hypertension  Alzheimer's dementia  H/O abdominal surgery    Home Medications:  albuterol-ipratropium 2.5 mg-0.5 mg/3 mL inhalation solution: 3 milliliter(s) inhaled every 6 hours (07 Mar 2019 14:18)  Aldactone 25 mg oral tablet: 1 tab(s) orally once a day (07 Mar 2019 14:18)  Calcium 600+D 600 mg-200 intl units oral tablet:  orally once a day (07 Mar 2019 14:18)  Calcium 600+D oral tablet: 1 tab(s) orally 2 times a day (07 Mar 2019 14:18)  Digox 125 mcg (0.125 mg) oral tablet: 1 tab(s) orally once a day (07 Mar 2019 14:18)  donepezil 10 mg oral tablet: 1 tab(s) orally once a day (at bedtime) (07 Mar 2019 14:18)  furosemide 20 mg oral tablet: 1 tab(s) orally every other day (07 Mar 2019 14:18)  Januvia 50 mg oral tablet: 1 tab(s) orally once a day (07 Mar 2019 14:18)  Lasix 20 mg oral tablet: 1 tab(s) orally once a day, As Needed (07 Mar 2019 14:18)  metoprolol tartrate 25 mg oral tablet: 1 tab(s) orally 2 times a day (07 Mar 2019 14:18)  metoprolol tartrate 25 mg oral tablet: 1 tab(s) orally 2 times a day (07 Mar 2019 14:18)  Norvasc 2.5 mg oral tablet: 1 tab(s) orally once a day (07 Mar 2019 14:18)  pioglitazone 30 mg oral tablet: 1 tab(s) orally once a day (07 Mar 2019 14:18)  Zocor 10 mg oral tablet: 1 tab(s) orally once a day (at bedtime) (07 Mar 2019 14:18)    FAMILY HISTORY:  No pertinent family history of Dementia.     ADVANCE DIRECTIVES:    DNR [ X]  MOLST  [ X]    Living Will  [ ]   DECISION MAKER(s):  [ ] Health Care Proxy(s)  [X ] Surrogate(s)  [ ] Guardian           Name(s) and Contact(s):  Decision-maker: children (6 children) -   Chantel Massey: [phone]  [place] [phone]  [facility] Rizwan Massey [phone]  [place] [facility] [phone]  [place] Massey [phone]  Bryson Golden [phone]      MEDICATIONS  (STANDING):  ALBUTerol/ipratropium for Nebulization 3 milliLiter(s) Nebulizer every 6 hours  aspirin  chewable 81 milliGRAM(s) Oral daily  BACItracin   Ointment 1 Application(s) Topical two times a day  dextrose 5%. 1000 milliLiter(s) (50 mL/Hr) IV Continuous <Continuous>  dextrose 5%. 1000 milliLiter(s) (50 mL/Hr) IV Continuous <Continuous>  dextrose 5%. 1000 milliLiter(s) (50 mL/Hr) IV Continuous <Continuous>  dextrose 50% Injectable 12.5 Gram(s) IV Push once  dextrose 50% Injectable 25 Gram(s) IV Push once  dextrose 50% Injectable 25 Gram(s) IV Push once  heparin  Injectable 5000 Unit(s) SubCutaneous every 12 hours  insulin lispro (HumaLOG) corrective regimen sliding scale   SubCutaneous at bedtime  insulin lispro (HumaLOG) corrective regimen sliding scale.   SubCutaneous every 6 hours  metoprolol tartrate Injectable 5 milliGRAM(s) IV Push every 6 hours  petrolatum white Ointment 1 Application(s) Topical two times a day  piperacillin/tazobactam IVPB. 3.375 Gram(s) IV Intermittent every 12 hours  polyethylene glycol 3350 17 Gram(s) Oral daily  senna Syrup 10 milliLiter(s) Oral at bedtime    MEDICATIONS  (PRN):  ALBUTerol    90 MICROgram(s) HFA Inhaler 2 Puff(s) Inhalation every 12 hours PRN asthma  dextrose 40% Gel 15 Gram(s) Oral once PRN Blood Glucose LESS THAN 70 milliGRAM(s)/deciliter  glucagon  Injectable 1 milliGRAM(s) IntraMuscular once PRN Glucose LESS THAN 70 milligrams/deciliter      REVIEW OF SYSTEMS:  unable to assess from patient    Dyspnea:                           [ ]Mild [ x]Moderate [ ]Severe  Anxiety:                             [ ]Mild [ ]Moderate [ ]Severe  Fatigue:                             [ ]Mild [x ]Moderate [ ]Severe  Nausea:                             [ ]Mild [ ]Moderate [ ]Severe  Loss of appetite:              [ ]Mild [ ]Moderate [ ]Severe  Constipation:                    [ ]Mild [ ]Moderate [ ]Severe    Other Symptoms:  [ ]All other review of systems negative     Karnofsky Performance Score/Palliative Performance Status Version 2:    20  %    http://palliative.info/resource_material/[email or web address].pdf    PHYSICAL EXAM:  Vital Signs Last 24 Hrs  T(C): 36.3 (14 Mar 2019 13:45), Max: 37.1 (14 Mar 2019 05:34)  T(F): 97.3 (14 Mar 2019 13:45), Max: 98.7 (14 Mar 2019 05:34)  HR: 75 (14 Mar 2019 13:45) (75 - 83)  BP: 165/76 (14 Mar 2019 13:45) (112/68 - 178/78)  BP(mean): --  RR: 18 (14 Mar 2019 13:45) (18 - 20)  SpO2: 100% (14 Mar 2019 13:45) (95% - 100%)    GENERAL:  [ ]Alert  [ ]Oriented  [ ]Lethargic  [ ]Cachexia  [ ]Unarousable  [ ]Verbal  [x ]Non-Verbal at this time.   Behavioral:   [ ] Anxiety  [ ] Delirium [ ] Agitation [x ] Other: Restless   HEENT:  [ x]Normal   [ ]Dry mouth   [ ]ET Tube/Trach  [ ]Oral lesions  PULMONARY:   [x ]Clear [ ]Tachypnea  [ ]Audible excessive secretions   [ ]Rhonchi        [ ]Right [ ]Left [x ]Bilateral  [ ]Crackles        [ ]Right [ ]Left [ ]Bilateral  [ ]Wheezing     [ ]Right [ ]Left [ ]Bilateral  CARDIOVASCULAR:    [ x]Regular [ ]Irregular [ ]Tachy  [ ]Alfonso [ ]Murmur [ ]Other  GASTROINTESTINAL:  [ ]Soft  [ ]Distended   [ ]+BS  [ ]Non tender [ ]Tender  [ ]PEG [ ]OGT/ NGT  Last BM: 3/13  GENITOURINARY:  [ ]Normal [x ] Incontinent   [ ]Oliguria/Anuria   [ ]Sawant  MUSCULOSKELETAL:   [ ]Normal   [ x]Weakness  [ x]Bed/Wheelchair bound [ x]Edema: 1+ LLE  NEUROLOGIC:   [ ]No focal deficits  [x ] Cognitive impairment  [ ] Dysphagia [ ]Dysarthria [ ] Paresis [ ]Other   SKIN:   [x ]Normal   [ ]Pressure ulcer(s)  [ ]Rash    CRITICAL CARE:  [ ] Shock Present  [ ]Septic [ ]Cardiogenic [ ]Neurologic [ ]Hypovolemic  [ ]  Vasopressors [ ]  Inotropes   [x ] Respiratory failure present  [x ] Acute  [ ] Chronic [ ] Hypoxic  [ ] Hypercarbic [ ] Other  [ ] Other organ failure     LABS:                          10.8   4.92  )-----------( 176      ( 11 Mar 2019 08:01 )             35.8     03-11    140  |  100  |  13  ----------------------------<  216<H>  3.7   |  30  |  0.59    Ca    9.7      11 Mar 2019 06:47    RADIOLOGY & ADDITIONAL STUDIES:  CT Abdomen: 3/7/19  Bilateral pleural effusions, left greater than right. Parenchymal consolidation in the right lower lobe has increased since 3/4/2019, and there are central areas of cavitation, suspicious for   necrotizing pneumonia.  -Diffuse colitis involving the descending colon. Infectious, ischemic, and inflammatory etiologies are considered.  -Small to moderate volume of ascites.    CXR: 3/7/19  -Interstitial edema and bilateral effusions are unchanged. Right apical pleural thickening is stable. Right upper lobe focal area of scarring or atelectasis also stable.    PROTEIN CALORIE MALNUTRITION PRESENT: [ ] Yes [ ] No  [ ] PPSV2 < or = to 30% [ ] significant weight loss  [x ] poor nutritional intake [ ] catabolic state [ ] anasarca     Albumin, Serum: 2.7 g/dL (03-08-19 @ 07:16)  Artificial Nutrition [ ]     REFERRALS:   [ ]Chaplaincy  [ ] Hospice  [ ]Child Life  [ ]Social Work  [ ]Case management [ ]Holistic Therapy   Goals of Care Discussion Document: HPI: 89F with PMH of Alzheimer's, HTN, AF ( not on AC), CHF (last documented EF from 2015 of 62%), asthma, cholecystectomy who presented to ED with progressive lethargy and dyspnea over a 3-4 day period, and worsening LE edema. Found to have hypercapnic respiratory failure and intubated; also underwent thoracentesis (3/6) for L sided effusion, likely related to HFrEF exacerbation +/- PNA. Was extubated, transitioned to BiPAP and transferred to medicine. Failed S&S evaluation 3/8. Per family, patient has been declining slowly over the past few months, with less ambulation and less PO intake. Palliative was consulted to help discuss overall GOC.     INTERVAL EVENTS: Patient was seen and examined with her daughters (Chnatel Massey, Arthur Massey), granddaughter (Melissa), and daughter in law (Archana) by her bedside. The patient was restless. She did not appear to be on any pain. However, she appeared to be having mild labored breathing.     PAST MEDICAL & SURGICAL HISTORY:  Arthritis  Asthma  Diabetes  Hypertension  Alzheimer's dementia  H/O abdominal surgery    Home Medications:  albuterol-ipratropium 2.5 mg-0.5 mg/3 mL inhalation solution: 3 milliliter(s) inhaled every 6 hours (07 Mar 2019 14:18)  Aldactone 25 mg oral tablet: 1 tab(s) orally once a day (07 Mar 2019 14:18)  Calcium 600+D 600 mg-200 intl units oral tablet:  orally once a day (07 Mar 2019 14:18)  Calcium 600+D oral tablet: 1 tab(s) orally 2 times a day (07 Mar 2019 14:18)  Digox 125 mcg (0.125 mg) oral tablet: 1 tab(s) orally once a day (07 Mar 2019 14:18)  donepezil 10 mg oral tablet: 1 tab(s) orally once a day (at bedtime) (07 Mar 2019 14:18)  furosemide 20 mg oral tablet: 1 tab(s) orally every other day (07 Mar 2019 14:18)  Januvia 50 mg oral tablet: 1 tab(s) orally once a day (07 Mar 2019 14:18)  Lasix 20 mg oral tablet: 1 tab(s) orally once a day, As Needed (07 Mar 2019 14:18)  metoprolol tartrate 25 mg oral tablet: 1 tab(s) orally 2 times a day (07 Mar 2019 14:18)  metoprolol tartrate 25 mg oral tablet: 1 tab(s) orally 2 times a day (07 Mar 2019 14:18)  Norvasc 2.5 mg oral tablet: 1 tab(s) orally once a day (07 Mar 2019 14:18)  pioglitazone 30 mg oral tablet: 1 tab(s) orally once a day (07 Mar 2019 14:18)  Zocor 10 mg oral tablet: 1 tab(s) orally once a day (at bedtime) (07 Mar 2019 14:18)    FAMILY HISTORY:  No pertinent family history of Dementia.     ADVANCE DIRECTIVES:    DNR [ X]  MOLST  [ X]    Living Will  [ ]   DECISION MAKER(s):  [ ] Health Care Proxy(s)  [X ] Surrogate(s)  [ ] Guardian           Name(s) and Contact(s):  Decision-maker: children (6 children) -   Chantel Massey: [phone]  [place] [phone]  [facility] Rizwan Massey [phone]  [place] [facility] [phone]  [place] [phone]  Bryson Golden [phone]      MEDICATIONS  (STANDING):  ALBUTerol/ipratropium for Nebulization 3 milliLiter(s) Nebulizer every 6 hours  aspirin  chewable 81 milliGRAM(s) Oral daily  BACItracin   Ointment 1 Application(s) Topical two times a day  dextrose 5%. 1000 milliLiter(s) (50 mL/Hr) IV Continuous <Continuous>  dextrose 5%. 1000 milliLiter(s) (50 mL/Hr) IV Continuous <Continuous>  dextrose 5%. 1000 milliLiter(s) (50 mL/Hr) IV Continuous <Continuous>  dextrose 50% Injectable 12.5 Gram(s) IV Push once  dextrose 50% Injectable 25 Gram(s) IV Push once  dextrose 50% Injectable 25 Gram(s) IV Push once  heparin  Injectable 5000 Unit(s) SubCutaneous every 12 hours  insulin lispro (HumaLOG) corrective regimen sliding scale   SubCutaneous at bedtime  insulin lispro (HumaLOG) corrective regimen sliding scale.   SubCutaneous every 6 hours  metoprolol tartrate Injectable 5 milliGRAM(s) IV Push every 6 hours  petrolatum white Ointment 1 Application(s) Topical two times a day  piperacillin/tazobactam IVPB. 3.375 Gram(s) IV Intermittent every 12 hours  polyethylene glycol 3350 17 Gram(s) Oral daily  senna Syrup 10 milliLiter(s) Oral at bedtime    MEDICATIONS  (PRN):  ALBUTerol    90 MICROgram(s) HFA Inhaler 2 Puff(s) Inhalation every 12 hours PRN asthma  dextrose 40% Gel 15 Gram(s) Oral once PRN Blood Glucose LESS THAN 70 milliGRAM(s)/deciliter  glucagon  Injectable 1 milliGRAM(s) IntraMuscular once PRN Glucose LESS THAN 70 milligrams/deciliter      REVIEW OF SYSTEMS:  unable to assess from patient    Dyspnea:                           [ ]Mild [ x]Moderate [ ]Severe  Anxiety:                             [ ]Mild [ ]Moderate [ ]Severe  Fatigue:                             [ ]Mild [x ]Moderate [ ]Severe  Nausea:                             [ ]Mild [ ]Moderate [ ]Severe  Loss of appetite:              [ ]Mild [ ]Moderate [ ]Severe  Constipation:                    [ ]Mild [ ]Moderate [ ]Severe    Other Symptoms:  [ ]All other review of systems negative     Karnofsky Performance Score/Palliative Performance Status Version 2:    20  %    http://palliative.info/resource_material/[email or web address].pdf    PHYSICAL EXAM:  Vital Signs Last 24 Hrs  T(C): 36.3 (14 Mar 2019 13:45), Max: 37.1 (14 Mar 2019 05:34)  T(F): 97.3 (14 Mar 2019 13:45), Max: 98.7 (14 Mar 2019 05:34)  HR: 75 (14 Mar 2019 13:45) (75 - 83)  BP: 165/76 (14 Mar 2019 13:45) (112/68 - 178/78)  BP(mean): --  RR: 18 (14 Mar 2019 13:45) (18 - 20)  SpO2: 100% (14 Mar 2019 13:45) (95% - 100%)    GENERAL:  [ ]Alert  [ ]Oriented  [ ]Lethargic  [ ]Cachexia  [ ]Unarousable  [ ]Verbal  [x ]Non-Verbal at this time.   Behavioral:   [ ] Anxiety  [ ] Delirium [ ] Agitation [x ] Other: Restless   HEENT:  [ x]Normal   [ ]Dry mouth   [ ]ET Tube/Trach  [ ]Oral lesions  PULMONARY:   [x ]Clear [ ]Tachypnea  [ ]Audible excessive secretions   [ ]Rhonchi        [ ]Right [ ]Left [x ]Bilateral  [ ]Crackles        [ ]Right [ ]Left [ ]Bilateral  [ ]Wheezing     [ ]Right [ ]Left [ ]Bilateral  episodic labored breathing.   CARDIOVASCULAR:    [ x]Regular [ ]Irregular [ ]Tachy  [ ]Alfonso [ ]Murmur [ ]Other  GASTROINTESTINAL:  [ ]Soft  [ ]Distended   [ ]+BS  [ ]Non tender [ ]Tender  [ ]PEG [ ]OGT/ NGT  Last BM: 3/13  GENITOURINARY:  [ ]Normal [x ] Incontinent   [ ]Oliguria/Anuria   [ ]Sawant  MUSCULOSKELETAL:   [ ]Normal   [ x]Weakness  [ x]Bed/Wheelchair bound [ x]Edema: 1+ LLE  NEUROLOGIC:   [ ]No focal deficits  [x ] Severe Cognitive impairment  [ ] Dysphagia [ ]Dysarthria [ ] Paresis [ ]Other   SKIN:   [x ]Normal   [ ]Pressure ulcer(s)  [ ]Rash    CRITICAL CARE:  [ ] Shock Present  [ ]Septic [ ]Cardiogenic [ ]Neurologic [ ]Hypovolemic  [ ]  Vasopressors [ ]  Inotropes   [x ] Respiratory failure present  [x ] Acute  [ ] Chronic [ ] Hypoxic  [ ] Hypercarbic [ ] Other  [ ] Other organ failure     LABS:                          10.8   4.92  )-----------( 176      ( 11 Mar 2019 08:01 )             35.8     03-11    140  |  100  |  13  ----------------------------<  216<H>  3.7   |  30  |  0.59    Ca    9.7      11 Mar 2019 06:47    RADIOLOGY & ADDITIONAL STUDIES:  CT Abdomen: 3/7/19  Bilateral pleural effusions, left greater than right. Parenchymal consolidation in the right lower lobe has increased since 3/4/2019, and there are central areas of cavitation, suspicious for   necrotizing pneumonia.  -Diffuse colitis involving the descending colon. Infectious, ischemic, and inflammatory etiologies are considered.  -Small to moderate volume of ascites.    CXR: 3/7/19  -Interstitial edema and bilateral effusions are unchanged. Right apical pleural thickening is stable. Right upper lobe focal area of scarring or atelectasis also stable.    PROTEIN CALORIE MALNUTRITION PRESENT: [ ] Yes [ ] No  [ ] PPSV2 < or = to 30% [ ] significant weight loss  [x ] poor nutritional intake [ ] catabolic state [ ] anasarca     Albumin, Serum: 2.7 g/dL (03-08-19 @ 07:16)  Artificial Nutrition [ ]     REFERRALS:   [ ]Chaplaincy  [ ] Hospice  [ ]Child Life  [ ]Social Work  [ ]Case management [ ]Holistic Therapy   Goals of Care Discussion Document:

## 2019-03-14 NOTE — PROGRESS NOTE ADULT - SUBJECTIVE AND OBJECTIVE BOX
Patient is a 89y old  Female who presents with a chief complaint of hypercapnic resp failure (14 Mar 2019 10:42)      SUBJECTIVE / OVERNIGHT EVENTS: Patient seen and examined at bedside. She appears comfortable, was sleepy this morning, during afternoon rounds she is more awake.     ROS:  unable to obtain due to dementia     Allergies    No Known Allergies    Intolerances        MEDICATIONS  (STANDING):  ALBUTerol/ipratropium for Nebulization 3 milliLiter(s) Nebulizer every 6 hours  aspirin  chewable 81 milliGRAM(s) Oral daily  BACItracin   Ointment 1 Application(s) Topical two times a day  dextrose 5%. 1000 milliLiter(s) (50 mL/Hr) IV Continuous <Continuous>  dextrose 5%. 1000 milliLiter(s) (50 mL/Hr) IV Continuous <Continuous>  dextrose 5%. 1000 milliLiter(s) (50 mL/Hr) IV Continuous <Continuous>  dextrose 50% Injectable 12.5 Gram(s) IV Push once  dextrose 50% Injectable 25 Gram(s) IV Push once  dextrose 50% Injectable 25 Gram(s) IV Push once  heparin  Injectable 5000 Unit(s) SubCutaneous every 12 hours  insulin lispro (HumaLOG) corrective regimen sliding scale   SubCutaneous at bedtime  insulin lispro (HumaLOG) corrective regimen sliding scale.   SubCutaneous every 6 hours  metoprolol tartrate Injectable 5 milliGRAM(s) IV Push every 6 hours  petrolatum white Ointment 1 Application(s) Topical two times a day  piperacillin/tazobactam IVPB. 3.375 Gram(s) IV Intermittent every 12 hours  polyethylene glycol 3350 17 Gram(s) Oral daily  senna Syrup 10 milliLiter(s) Oral at bedtime    MEDICATIONS  (PRN):  ALBUTerol    90 MICROgram(s) HFA Inhaler 2 Puff(s) Inhalation every 12 hours PRN asthma  dextrose 40% Gel 15 Gram(s) Oral once PRN Blood Glucose LESS THAN 70 milliGRAM(s)/deciliter  glucagon  Injectable 1 milliGRAM(s) IntraMuscular once PRN Glucose LESS THAN 70 milligrams/deciliter      Vital Signs Last 24 Hrs  T(C): 36.3 (14 Mar 2019 13:45), Max: 37.1 (14 Mar 2019 05:34)  T(F): 97.3 (14 Mar 2019 13:45), Max: 98.7 (14 Mar 2019 05:34)  HR: 75 (14 Mar 2019 13:45) (74 - 83)  BP: 165/76 (14 Mar 2019 13:45) (112/68 - 178/78)  BP(mean): --  RR: 18 (14 Mar 2019 13:45) (18 - 20)  SpO2: 100% (14 Mar 2019 13:45) (94% - 100%)  CAPILLARY BLOOD GLUCOSE      POCT Blood Glucose.: 131 mg/dL (14 Mar 2019 12:08)  POCT Blood Glucose.: 134 mg/dL (14 Mar 2019 08:29)  POCT Blood Glucose.: 117 mg/dL (13 Mar 2019 21:22)  POCT Blood Glucose.: 111 mg/dL (13 Mar 2019 16:57)    I&O's Summary    13 Mar 2019 07:01  -  14 Mar 2019 07:00  --------------------------------------------------------  IN: 200 mL / OUT: 0 mL / NET: 200 mL    14 Mar 2019 07:01  -  14 Mar 2019 15:23  --------------------------------------------------------  IN: 0 mL / OUT: 0 mL / NET: 0 mL        PHYSICAL EXAM:  GENERAL: NAD, frail   HEAD:  Atraumatic, Normocephalic  EYES: EOMI, PERRLA, conjunctiva and sclera clear  CHEST/LUNG: decreased breath sound right lung.; No wheeze  HEART: Regular rate and rhythm; No murmurs, rubs, or gallops  ABDOMEN: Soft, Nontender, Nondistended; Bowel sounds present  EXTREMITIES:  2+ Peripheral Pulses, No clubbing, cyanosis, or edema  NEUROLOGY: awake and alert , slow to respond to questions   SKIN: No rashes or lesions      RADIOLOGY & ADDITIONAL TESTS:    Consultant(s) Notes Reviewed:  ID recs noteed     Care Discussed with Consultants/Other Providers: Medicine NP and Palliative care     Case Discussed with ravin Torres

## 2019-03-15 DIAGNOSIS — Z51.5 ENCOUNTER FOR PALLIATIVE CARE: ICD-10-CM

## 2019-03-15 LAB
ANION GAP SERPL CALC-SCNC: 9 MMOL/L — SIGNIFICANT CHANGE UP (ref 5–17)
BUN SERPL-MCNC: 10 MG/DL — SIGNIFICANT CHANGE UP (ref 7–23)
CALCIUM SERPL-MCNC: 9.3 MG/DL — SIGNIFICANT CHANGE UP (ref 8.4–10.5)
CHLORIDE SERPL-SCNC: 100 MMOL/L — SIGNIFICANT CHANGE UP (ref 96–108)
CO2 SERPL-SCNC: 33 MMOL/L — HIGH (ref 22–31)
CREAT SERPL-MCNC: 0.58 MG/DL — SIGNIFICANT CHANGE UP (ref 0.5–1.3)
GAS PNL BLDV: SIGNIFICANT CHANGE UP
GLUCOSE SERPL-MCNC: 102 MG/DL — HIGH (ref 70–99)
HCT VFR BLD CALC: 34.6 % — SIGNIFICANT CHANGE UP (ref 34.5–45)
HCT VFR BLD CALC: 36.6 % — SIGNIFICANT CHANGE UP (ref 34.5–45)
HGB BLD-MCNC: 10.5 G/DL — LOW (ref 11.5–15.5)
HGB BLD-MCNC: 11 G/DL — LOW (ref 11.5–15.5)
MCHC RBC-ENTMCNC: 30.1 GM/DL — LOW (ref 32–36)
MCHC RBC-ENTMCNC: 30.3 GM/DL — LOW (ref 32–36)
MCHC RBC-ENTMCNC: 30.9 PG — SIGNIFICANT CHANGE UP (ref 27–34)
MCHC RBC-ENTMCNC: 31.1 PG — SIGNIFICANT CHANGE UP (ref 27–34)
MCV RBC AUTO: 101.8 FL — HIGH (ref 80–100)
MCV RBC AUTO: 103.4 FL — HIGH (ref 80–100)
PLATELET # BLD AUTO: 196 K/UL — SIGNIFICANT CHANGE UP (ref 150–400)
PLATELET # BLD AUTO: 202 K/UL — SIGNIFICANT CHANGE UP (ref 150–400)
POTASSIUM SERPL-MCNC: 3.6 MMOL/L — SIGNIFICANT CHANGE UP (ref 3.5–5.3)
POTASSIUM SERPL-SCNC: 3.6 MMOL/L — SIGNIFICANT CHANGE UP (ref 3.5–5.3)
RBC # BLD: 3.4 M/UL — LOW (ref 3.8–5.2)
RBC # BLD: 3.54 M/UL — LOW (ref 3.8–5.2)
RBC # FLD: 15.4 % — HIGH (ref 10.3–14.5)
RBC # FLD: 15.4 % — HIGH (ref 10.3–14.5)
SODIUM SERPL-SCNC: 142 MMOL/L — SIGNIFICANT CHANGE UP (ref 135–145)
WBC # BLD: 3.47 K/UL — LOW (ref 3.8–10.5)
WBC # BLD: 4.64 K/UL — SIGNIFICANT CHANGE UP (ref 3.8–10.5)
WBC # FLD AUTO: 3.47 K/UL — LOW (ref 3.8–10.5)
WBC # FLD AUTO: 4.64 K/UL — SIGNIFICANT CHANGE UP (ref 3.8–10.5)

## 2019-03-15 PROCEDURE — 99232 SBSQ HOSP IP/OBS MODERATE 35: CPT

## 2019-03-15 RX ORDER — MORPHINE SULFATE 50 MG/1
0.5 CAPSULE, EXTENDED RELEASE ORAL
Qty: 0 | Refills: 0 | Status: DISCONTINUED | OUTPATIENT
Start: 2019-03-15 | End: 2019-03-17

## 2019-03-15 RX ADMIN — PIPERACILLIN AND TAZOBACTAM 25 GRAM(S): 4; .5 INJECTION, POWDER, LYOPHILIZED, FOR SOLUTION INTRAVENOUS at 06:24

## 2019-03-15 RX ADMIN — Medication 1 APPLICATION(S): at 06:25

## 2019-03-15 RX ADMIN — Medication 5 MILLIGRAM(S): at 06:38

## 2019-03-15 RX ADMIN — HEPARIN SODIUM 5000 UNIT(S): 5000 INJECTION INTRAVENOUS; SUBCUTANEOUS at 06:24

## 2019-03-15 RX ADMIN — Medication 1 APPLICATION(S): at 06:24

## 2019-03-15 RX ADMIN — Medication 3 MILLILITER(S): at 17:07

## 2019-03-15 RX ADMIN — PIPERACILLIN AND TAZOBACTAM 25 GRAM(S): 4; .5 INJECTION, POWDER, LYOPHILIZED, FOR SOLUTION INTRAVENOUS at 17:05

## 2019-03-15 RX ADMIN — MORPHINE SULFATE 0.5 MILLIGRAM(S): 50 CAPSULE, EXTENDED RELEASE ORAL at 11:56

## 2019-03-15 RX ADMIN — Medication 3 MILLILITER(S): at 06:24

## 2019-03-15 RX ADMIN — Medication 1 APPLICATION(S): at 17:06

## 2019-03-15 RX ADMIN — Medication 5 MILLIGRAM(S): at 01:04

## 2019-03-15 RX ADMIN — Medication 5 MILLIGRAM(S): at 11:56

## 2019-03-15 RX ADMIN — Medication 5 MILLIGRAM(S): at 17:05

## 2019-03-15 RX ADMIN — HEPARIN SODIUM 5000 UNIT(S): 5000 INJECTION INTRAVENOUS; SUBCUTANEOUS at 17:04

## 2019-03-15 RX ADMIN — SENNA PLUS 10 MILLILITER(S): 8.6 TABLET ORAL at 21:27

## 2019-03-15 RX ADMIN — Medication 3 MILLILITER(S): at 11:56

## 2019-03-15 RX ADMIN — Medication 1: at 01:55

## 2019-03-15 RX ADMIN — Medication 3 MILLILITER(S): at 01:04

## 2019-03-15 NOTE — PROGRESS NOTE ADULT - NSICDXPROBLEM_GEN_ALL_CORE_FT
PROBLEM DIAGNOSES  Problem: Acute respiratory failure with hypercapnia  Assessment and Plan: -S/p extubation in MICU and thoracentesis. CT abdomen shows RLL areas of cavitation, concerning for necrotizing PNA.   -C/w Zosyn for now, per ID recs, last dose  3/18/19  -noted to have elevated bicar on BMP, STAT VBG showed CO2 of 40   - Pt is DNR/DNI, started on BiPAP  - Hospice eval pendning today   - started on morphine 0.5 mg IV q3h PRN for SOB.       Problem: Bacteremia  Assessment and Plan: Found to have Coagulase negative Staph, Enterococcus faecalis, Strep species on earlier cultures. Blood cultures subsequently cleared.   -CT abdomen shows colitis, which likely explains the multi-organism bacteremia.   -C/w Zosyn for now, per ID recs. for 2 week course of abx today (#12/14 days)   -Echo without any obvious vegetations.      Problem: Acute deep vein thrombosis (DVT) of left upper extremity after procedure  Assessment and Plan: DVT LE + with underling chronic Afib   - discussed AC risks and benifits in detail with patient's children and granddaughter,   - they would like to hold off on AC at this time. and c/w supporitve care     Problem: Encephalopathy  Assessment and Plan: Patient also with some acute encephalopathy on chronic dementia. Likely multifactorial due to hospitalization and infection, etc.   -Monitor mental status closely.   -Fall precautions,      Problem: Advanced care planning/counseling discussion  Assessment and Plan: Had long conversation with patient's granddaughter and daughters at bedside. Informed them of patient's guarded prognosis.  - The family do not want feeding tube, would like to pursue comfort feeding   -MOLST completed by them, patient now DNR.   - s/p family meeting today, family agreeable to Hospice,   - Hospice eval pending today       Problem: Severe protein-calorie malnutrition  Assessment and Plan: Patient unable to participate with swallow eval due to poor participation.   - S&S reevaluated today, due to pt being sleepy and increased work of breathing, unable to assess  -discussed dysphagia in detail with family, they would like to pursue comfort feeding at this time, they have declined NGT, PEG tube.   - comfort feeding started  - meeting [facility] tomorrow       Problem: VANIA (acute kidney injury)  Assessment and Plan: resolved monitor BMP     Problem: Prophylactic measure  Assessment and Plan: Heparin SQ for DVT PPx.  -PT eval.  - prognosis guarded      Problem: Chronic atrial fibrillation  Assessment and Plan: Not on AC at home due to age and fall risk.    -C/w ASA when able to take PO.   -C/w IV metoprolo 5mg IV Q6H for elevated BP  -Was on digoxin at home, held here due to elevated level on admission. Level eventually came down. Consider resuming if rate not controlled.       Problem: Pleural effusion  Assessment and Plan: 2/2 necrotizing PNA seen on CT. Culture with no growth but after antibiotics had already been initiated. Respiratory status is improved after tap.  -fluid cytopathology: neg for malignancy   -Monitor strict I's/O's and daily weights.

## 2019-03-15 NOTE — PROVIDER CONTACT NOTE (MEDICATION) - ASSESSMENT
Pt is A&Ox0-baseline, pt lethargic, responses to gentle shake or pain stimulus. Pt do for PO medications although pt not alert enough to swallow meds safely.

## 2019-03-15 NOTE — PROGRESS NOTE ADULT - SUBJECTIVE AND OBJECTIVE BOX
CC: Patient is a 89y old  Female who presents with a chief complaint of hypercapnic resp failure (15 Mar 2019 13:08)    ID following for bacteremia    Interval History/ROS: Patient arousable today, afebrile, WBC WNL. Per daughter at bedside, only taking a few sips of soup.    Rest of ROS negative.    Allergies  No Known Allergies    ANTIMICROBIALS:  piperacillin/tazobactam IVPB. 3.375 every 12 hours    OTHER MEDS:  ALBUTerol    90 MICROgram(s) HFA Inhaler 2 Puff(s) Inhalation every 12 hours PRN  ALBUTerol/ipratropium for Nebulization 3 milliLiter(s) Nebulizer every 6 hours  aspirin  chewable 81 milliGRAM(s) Oral daily  BACItracin   Ointment 1 Application(s) Topical two times a day  dextrose 40% Gel 15 Gram(s) Oral once PRN  dextrose 5%. 1000 milliLiter(s) IV Continuous <Continuous>  dextrose 5%. 1000 milliLiter(s) IV Continuous <Continuous>  dextrose 5%. 1000 milliLiter(s) IV Continuous <Continuous>  dextrose 50% Injectable 12.5 Gram(s) IV Push once  dextrose 50% Injectable 25 Gram(s) IV Push once  dextrose 50% Injectable 25 Gram(s) IV Push once  glucagon  Injectable 1 milliGRAM(s) IntraMuscular once PRN  heparin  Injectable 5000 Unit(s) SubCutaneous every 12 hours  insulin lispro (HumaLOG) corrective regimen sliding scale.   SubCutaneous every 6 hours  metoprolol tartrate Injectable 5 milliGRAM(s) IV Push every 6 hours  morphine  - Injectable 0.5 milliGRAM(s) IV Push every 3 hours PRN  petrolatum white Ointment 1 Application(s) Topical two times a day  polyethylene glycol 3350 17 Gram(s) Oral daily  senna Syrup 10 milliLiter(s) Oral at bedtime  sodium chloride 0.9%. 1000 milliLiter(s) IV Continuous <Continuous>    PE:    Vital Signs Last 24 Hrs  T(C): 35.8 (15 Mar 2019 13:31), Max: 37.1 (15 Mar 2019 01:01)  T(F): 96.5 (15 Mar 2019 13:31), Max: 98.8 (15 Mar 2019 01:01)  HR: 77 (15 Mar 2019 13:31) (70 - 87)  BP: 129/73 (15 Mar 2019 13:31) (129/73 - 184/78)  BP(mean): --  RR: 18 (15 Mar 2019 13:31) (18 - 20)  SpO2: 100% (15 Mar 2019 13:31) (98% - 100%)    Gen: Arousable, NAD, cachectic, temporal wasting  CV: S1+S2 normal, no murmurs  Resp: Clear bilat, no resp distress  Abd: Soft, nontender, +BS  Ext: no wounds  : No Sawant  IV/Skin: No thrombophlebitis  Neuro: no focal deficits      LABS:                          11.0   4.64  )-----------( 202      ( 15 Mar 2019 09:19 )             36.6       03-15    142  |  100  |  10  ----------------------------<  102<H>  3.6   |  33<H>  |  0.58    Ca    9.3      15 Mar 2019 07:14    MICROBIOLOGY:  v  .Body Fluid Pleural Fluid  03-06-19   No growth at 1 week.  --    No polymorphonuclear cells seen  No organisms seen  by cytocentrifuge      .Blood Blood  03-06-19   No growth at 5 days.  --  --      .Sputum Sputum  03-05-19   No growth at 1 week.  --  --      Bronch Wash Combicath  03-04-19   Normal Respiratory Anna present  --    Numerous polymorphonuclear leukocytes per low power field  Rare Squamous Epithelial Cells per low power field  Few Gram Variable Rods per oil power field  Rare Gram positive cocci in pairs per oil power field      .Urine Catheterized  03-04-19   No growth  --  --      .Blood Blood-Peripheral  03-04-19   Growth in aerobic bottle: Enterococcus faecalis  Growth in aerobic bottle: Staphylococcus epidermidis "Susceptibilities  not performed"  Growth in aerobic bottle: Streptococcus salivarius "Susceptibilities not  performed"  "Due to technical problems, Proteus sp. will Not be reported as part of  the BCID panel until further notice"  ***Blood Panel PCR results on this specimen are available  approximately 3 hours after the Gram stain result.***  Gram stain, PCR, and/or culture results may not always  correspond due to difference in methodologies.  ************************************************************  This PCR assay was performed using [employer].  The following targets are tested for: Enterococcus,  vancomycin resistant enterococci, Listeria monocytogenes,  coagulase negative staphylococci, S. aureus,  methicillin resistant S. aureus, Streptococcus agalactiae  (Group B), S. pneumoniae, S. pyogenes (Group A),  Acinetobacter baumannii, Enterobacter cloacae, E. coli,  Klebsiella oxytoca, K. pneumoniae, Proteus sp.,  Serratia marcescens, Haemophilus influenzae,  Neisseria meningitidis, Pseudomonas aeruginosa, Candida  albicans, C. glabrata, C krusei, C parapsilosis,  C. tropicalis and the KPC resistance gene.  --  Blood Culture PCR  Enterococcus faecalis    RADIOLOGY:    < from: [facility] Duplex Upper Ext Vein Scan, Bilat (03.12.19 @ 15:15) >  Impression: On the left, the basilic vein, a superficial vein, is   thrombosed, with the thrombus extending into and occluded the axillary   vein at the confluence of the 2 veins.    Superficial thrombophlebitis affects the right cephalic vein.     < end of copied text >

## 2019-03-15 NOTE — PROGRESS NOTE ADULT - ASSESSMENT
89F with Alzheimer dementia, Afib, CHF and Asthma admitted 3/4/19 with hypoxic and hypercapnic respiratory failure, was intubated and admitted to MICU, managed for possible CHF and/or pneumonia. s/p thoracentesis for a left effusion 3/6/19, slightly exudative. Extubated and downgraded from MICU.   Blood cultures from 3/4/19 have grown Enterococcus faecalis and alpha Strep from one of four bottles. BioFire detected CoNS as well but it has not grown so far - check with micro and they will check again. If a true polymicrobial bacteremia would be concerned for a GI focus. Repeat cultures negative.   BAL and pleural fluid without bacterial growth    Afebrile and nontoxic but had an episode of hypothermia to 94.9F on night of 3/5   CT with colitis most likely source of enterococcus bacteremia  CT chest with cavitation suspicious for necrotizing PNA possibly aspiration PNA covered by zosyn    Recommend  -Continue Zosyn - would cover CT chest findings and enterococcal bacteremia and colitis  -Anticipate a 2-week course of antibiotics for the enterococcus in blood cultures, cavitary PNA  to complete 3/18/19.  -Aspiration precautions  -Appreciate Palliative input for GOC     prognosis guarded

## 2019-03-15 NOTE — GOALS OF CARE CONVERSATION - PERSONAL ADVANCE DIRECTIVE - CONVERSATION DETAILS
Met with pt's daughter Chantel at bedside using  services ([facility]  [id number]).  Pt's family do not feel pt can be cared for at home in her current condition and pt does have IV morphine ordered for management of dyspnea as of today, she has received one dose thus far.  Pt was restless but did not seem short of breath on BiPAP at time of assessment.  Pt does not appear to meet criteria for admission to inpt level of hospice care at this time, and in any case, Chantel would wish to discuss hospice care with the pt's other children (6 siblings) to be sure that they agree with a plan for inpt hospice care.  Pt's daughter requested care in PCU.  Informed Dr Lamont Zepeda, who will have PCT see the pt over the weekend and discuss transfer to PCU with family at that time.

## 2019-03-15 NOTE — PROVIDER CONTACT NOTE (MEDICATION) - ACTION/TREATMENT ORDERED:
NP Vishal aware. As per NP HOLD PO meds, no further intervention required at this time. Will continue to monitor

## 2019-03-15 NOTE — PROGRESS NOTE ADULT - SUBJECTIVE AND OBJECTIVE BOX
Patient is a 89y old  Female who presents with a chief complaint of hypercapnic resp failure (14 Mar 2019 16:45)      SUBJECTIVE / OVERNIGHT EVENTS: Patient seen and examined at bedside. Breathing appears a little labored today. no acute events overnight.     ROS:  unable to assess due to underlying dementia     Allergies    No Known Allergies    Intolerances        MEDICATIONS  (STANDING):  ALBUTerol/ipratropium for Nebulization 3 milliLiter(s) Nebulizer every 6 hours  aspirin  chewable 81 milliGRAM(s) Oral daily  BACItracin   Ointment 1 Application(s) Topical two times a day  dextrose 5%. 1000 milliLiter(s) (50 mL/Hr) IV Continuous <Continuous>  dextrose 5%. 1000 milliLiter(s) (50 mL/Hr) IV Continuous <Continuous>  dextrose 5%. 1000 milliLiter(s) (50 mL/Hr) IV Continuous <Continuous>  dextrose 50% Injectable 12.5 Gram(s) IV Push once  dextrose 50% Injectable 25 Gram(s) IV Push once  dextrose 50% Injectable 25 Gram(s) IV Push once  heparin  Injectable 5000 Unit(s) SubCutaneous every 12 hours  insulin lispro (HumaLOG) corrective regimen sliding scale.   SubCutaneous every 6 hours  metoprolol tartrate Injectable 5 milliGRAM(s) IV Push every 6 hours  petrolatum white Ointment 1 Application(s) Topical two times a day  piperacillin/tazobactam IVPB. 3.375 Gram(s) IV Intermittent every 12 hours  polyethylene glycol 3350 17 Gram(s) Oral daily  senna Syrup 10 milliLiter(s) Oral at bedtime  sodium chloride 0.9%. 1000 milliLiter(s) (50 mL/Hr) IV Continuous <Continuous>    MEDICATIONS  (PRN):  ALBUTerol    90 MICROgram(s) HFA Inhaler 2 Puff(s) Inhalation every 12 hours PRN asthma  dextrose 40% Gel 15 Gram(s) Oral once PRN Blood Glucose LESS THAN 70 milliGRAM(s)/deciliter  glucagon  Injectable 1 milliGRAM(s) IntraMuscular once PRN Glucose LESS THAN 70 milligrams/deciliter  morphine  - Injectable 0.5 milliGRAM(s) IV Push every 3 hours PRN dyspnea      Vital Signs Last 24 Hrs  T(C): 36.3 (15 Mar 2019 11:49), Max: 37.1 (15 Mar 2019 01:01)  T(F): 97.4 (15 Mar 2019 11:49), Max: 98.8 (15 Mar 2019 01:01)  HR: 70 (15 Mar 2019 11:49) (70 - 87)  BP: 145/78 (15 Mar 2019 11:49) (145/78 - 184/78)  BP(mean): --  RR: 20 (15 Mar 2019 11:49) (18 - 20)  SpO2: 100% (15 Mar 2019 11:49) (98% - 100%)  CAPILLARY BLOOD GLUCOSE      POCT Blood Glucose.: 107 mg/dL (15 Mar 2019 11:53)  POCT Blood Glucose.: 97 mg/dL (15 Mar 2019 06:23)  POCT Blood Glucose.: 152 mg/dL (15 Mar 2019 01:37)  POCT Blood Glucose.: 131 mg/dL (14 Mar 2019 18:14)    I&O's Summary    14 Mar 2019 07:01  -  15 Mar 2019 07:00  --------------------------------------------------------  IN: 720 mL / OUT: 0 mL / NET: 720 mL        PHYSICAL EXAM:  GENERAL: NAD, frail   HEAD:  Atraumatic, Normocephalic  EYES: EOMI, PERRLA, conjunctiva and sclera clear  CHEST/LUNG: decreased breath sound right lung.; using accessory muscles No wheeze  HEART: Regular rate and rhythm; No murmurs, rubs, or gallops  ABDOMEN: Soft, Nontender, Nondistended; Bowel sounds present  EXTREMITIES:  2+ Peripheral Pulses, No clubbing, cyanosis, or edema  NEUROLOGY: awake and alert , slow to respond to questions   SKIN: No rashes or lesions    LABS:                        11.0   4.64  )-----------( 202      ( 15 Mar 2019 09:19 )             36.6     03-15    142  |  100  |  10  ----------------------------<  102<H>  3.6   |  33<H>  |  0.58    Ca    9.3      15 Mar 2019 07:14                RADIOLOGY & ADDITIONAL TESTS:    Consultant(s) Notes Reviewed:  ID rec noted     Care Discussed with Consultants/Other Providers: Medicine NP and Palliative care (Dr. Zepeda)    Case Discussed with Dtr at bedside

## 2019-03-15 NOTE — CHART NOTE - NSCHARTNOTEFT_GEN_A_CORE
Nutrition Follow Up Note      Source: medical record, RN    Diet : Dysphagia 1 Nectar Consistency Fluid    Patient seen for malnutrition follow up. Medical chart reviewed/events noted. Per chart,  pt is DNR/DNI followed by palliative. Pt nonverbal, unable to obtain subjective information. No family at bedside. Pt on comfort feeds, per RN pt not eating. Last BM 2 days ago. Per previous RD note yesterday, pt's daughter-in-law declined need for additional snacks/ nutritional supplements as she brings food from outside.         Daily Weight in k (-13), Weight in k.8 (-10), Weight in k.6 ()- no new weight to assess, pt's UBW noted to be 90 pounds (41Kg).         Pertinent Medications: MEDICATIONS  (STANDING):  ALBUTerol/ipratropium for Nebulization 3 milliLiter(s) Nebulizer every 6 hours  aspirin  chewable 81 milliGRAM(s) Oral daily  BACItracin   Ointment 1 Application(s) Topical two times a day  dextrose 5%. 1000 milliLiter(s) (50 mL/Hr) IV Continuous <Continuous>  dextrose 5%. 1000 milliLiter(s) (50 mL/Hr) IV Continuous <Continuous>  dextrose 5%. 1000 milliLiter(s) (50 mL/Hr) IV Continuous <Continuous>  dextrose 50% Injectable 12.5 Gram(s) IV Push once  dextrose 50% Injectable 25 Gram(s) IV Push once  dextrose 50% Injectable 25 Gram(s) IV Push once  heparin  Injectable 5000 Unit(s) SubCutaneous every 12 hours  insulin lispro (HumaLOG) corrective regimen sliding scale.   SubCutaneous every 6 hours  metoprolol tartrate Injectable 5 milliGRAM(s) IV Push every 6 hours  petrolatum white Ointment 1 Application(s) Topical two times a day  piperacillin/tazobactam IVPB. 3.375 Gram(s) IV Intermittent every 12 hours  polyethylene glycol 3350 17 Gram(s) Oral daily  senna Syrup 10 milliLiter(s) Oral at bedtime  sodium chloride 0.9%. 1000 milliLiter(s) (50 mL/Hr) IV Continuous <Continuous>    MEDICATIONS  (PRN):  ALBUTerol    90 MICROgram(s) HFA Inhaler 2 Puff(s) Inhalation every 12 hours PRN asthma  dextrose 40% Gel 15 Gram(s) Oral once PRN Blood Glucose LESS THAN 70 milliGRAM(s)/deciliter  glucagon  Injectable 1 milliGRAM(s) IntraMuscular once PRN Glucose LESS THAN 70 milligrams/deciliter    Pertinent Labs: 03-15 @ 07:14: Na 142, BUN 10, Cr 0.58, <H>, K+ 3.6, Phos --, Mg --, Alk Phos --, ALT/SGPT --, AST/SGOT --, HbA1c --   @ 20:00: Na 142, BUN 11, Cr 0.62, <H>, K+ 3.9, Phos --, Mg --, Alk Phos --, ALT/SGPT --, AST/SGOT --, HbA1c --    Finger Sticks:  POCT Blood Glucose.: 97 mg/dL (03-15 @ 06:23)  POCT Blood Glucose.: 152 mg/dL (03-15 @ 01:37)  POCT Blood Glucose.: 131 mg/dL ( @ 18:14)  POCT Blood Glucose.: 131 mg/dL ( @ 12:08)      Skin per nursing documentation: no pressure ulcers  Edema: +1 left arm, +2 generalized edema     Estimated Needs:   [ X] no change since previous assessment  [ ] recalculated:     Previous Nutrition Diagnosis: Severe malnutrition   Nutrition Diagnosis is: ongoing     New Nutrition Diagnosis: N/A         Recommend  1) Continue with current diet as tolerated per pt/family GOC  2) RD to follow up for further nutritional interventions as indicated/requested by medical team/pt.     Monitoring and Evaluation:     Continue to monitor Nutritional intake, Tolerance to diet prescription, weights, labs, skin integrity    RD remains available upon request and will follow up per protocol

## 2019-03-16 DIAGNOSIS — G30.9 ALZHEIMER'S DISEASE, UNSPECIFIED: ICD-10-CM

## 2019-03-16 DIAGNOSIS — Z51.5 ENCOUNTER FOR PALLIATIVE CARE: ICD-10-CM

## 2019-03-16 DIAGNOSIS — J96.01 ACUTE RESPIRATORY FAILURE WITH HYPOXIA: ICD-10-CM

## 2019-03-16 DIAGNOSIS — R53.2 FUNCTIONAL QUADRIPLEGIA: ICD-10-CM

## 2019-03-16 LAB
ANION GAP SERPL CALC-SCNC: 11 MMOL/L — SIGNIFICANT CHANGE UP (ref 5–17)
BUN SERPL-MCNC: 11 MG/DL — SIGNIFICANT CHANGE UP (ref 7–23)
CALCIUM SERPL-MCNC: 9.4 MG/DL — SIGNIFICANT CHANGE UP (ref 8.4–10.5)
CHLORIDE SERPL-SCNC: 100 MMOL/L — SIGNIFICANT CHANGE UP (ref 96–108)
CO2 SERPL-SCNC: 33 MMOL/L — HIGH (ref 22–31)
CREAT SERPL-MCNC: 0.61 MG/DL — SIGNIFICANT CHANGE UP (ref 0.5–1.3)
GLUCOSE SERPL-MCNC: 119 MG/DL — HIGH (ref 70–99)
HCT VFR BLD CALC: 38.1 % — SIGNIFICANT CHANGE UP (ref 34.5–45)
HGB BLD-MCNC: 11.2 G/DL — LOW (ref 11.5–15.5)
MCHC RBC-ENTMCNC: 29.4 GM/DL — LOW (ref 32–36)
MCHC RBC-ENTMCNC: 30.7 PG — SIGNIFICANT CHANGE UP (ref 27–34)
MCV RBC AUTO: 104.4 FL — HIGH (ref 80–100)
PLATELET # BLD AUTO: 217 K/UL — SIGNIFICANT CHANGE UP (ref 150–400)
POTASSIUM SERPL-MCNC: 3.9 MMOL/L — SIGNIFICANT CHANGE UP (ref 3.5–5.3)
POTASSIUM SERPL-SCNC: 3.9 MMOL/L — SIGNIFICANT CHANGE UP (ref 3.5–5.3)
RBC # BLD: 3.65 M/UL — LOW (ref 3.8–5.2)
RBC # FLD: 15.9 % — HIGH (ref 10.3–14.5)
SODIUM SERPL-SCNC: 144 MMOL/L — SIGNIFICANT CHANGE UP (ref 135–145)
WBC # BLD: 3.82 K/UL — SIGNIFICANT CHANGE UP (ref 3.8–10.5)
WBC # FLD AUTO: 3.82 K/UL — SIGNIFICANT CHANGE UP (ref 3.8–10.5)

## 2019-03-16 PROCEDURE — 99233 SBSQ HOSP IP/OBS HIGH 50: CPT

## 2019-03-16 RX ORDER — BACITRACIN ZINC 500 UNIT/G
1 OINTMENT IN PACKET (EA) TOPICAL
Qty: 0 | Refills: 0 | Status: DISCONTINUED | OUTPATIENT
Start: 2019-03-16 | End: 2019-03-18

## 2019-03-16 RX ADMIN — PIPERACILLIN AND TAZOBACTAM 25 GRAM(S): 4; .5 INJECTION, POWDER, LYOPHILIZED, FOR SOLUTION INTRAVENOUS at 05:22

## 2019-03-16 RX ADMIN — Medication 3 MILLILITER(S): at 05:21

## 2019-03-16 RX ADMIN — MORPHINE SULFATE 0.5 MILLIGRAM(S): 50 CAPSULE, EXTENDED RELEASE ORAL at 12:51

## 2019-03-16 RX ADMIN — Medication 5 MILLIGRAM(S): at 17:32

## 2019-03-16 RX ADMIN — HEPARIN SODIUM 5000 UNIT(S): 5000 INJECTION INTRAVENOUS; SUBCUTANEOUS at 17:32

## 2019-03-16 RX ADMIN — Medication 1 APPLICATION(S): at 17:32

## 2019-03-16 RX ADMIN — MORPHINE SULFATE 0.5 MILLIGRAM(S): 50 CAPSULE, EXTENDED RELEASE ORAL at 07:33

## 2019-03-16 RX ADMIN — PIPERACILLIN AND TAZOBACTAM 25 GRAM(S): 4; .5 INJECTION, POWDER, LYOPHILIZED, FOR SOLUTION INTRAVENOUS at 17:31

## 2019-03-16 RX ADMIN — Medication 1 APPLICATION(S): at 05:22

## 2019-03-16 RX ADMIN — Medication 5 MILLIGRAM(S): at 05:21

## 2019-03-16 RX ADMIN — HEPARIN SODIUM 5000 UNIT(S): 5000 INJECTION INTRAVENOUS; SUBCUTANEOUS at 05:21

## 2019-03-16 RX ADMIN — Medication 5 MILLIGRAM(S): at 12:51

## 2019-03-16 RX ADMIN — Medication 3 MILLILITER(S): at 01:02

## 2019-03-16 RX ADMIN — MORPHINE SULFATE 0.5 MILLIGRAM(S): 50 CAPSULE, EXTENDED RELEASE ORAL at 12:50

## 2019-03-16 RX ADMIN — Medication 5 MILLIGRAM(S): at 01:01

## 2019-03-16 RX ADMIN — Medication 3 MILLILITER(S): at 17:33

## 2019-03-16 RX ADMIN — MORPHINE SULFATE 0.5 MILLIGRAM(S): 50 CAPSULE, EXTENDED RELEASE ORAL at 17:31

## 2019-03-16 RX ADMIN — Medication 1 APPLICATION(S): at 05:21

## 2019-03-16 RX ADMIN — MORPHINE SULFATE 0.5 MILLIGRAM(S): 50 CAPSULE, EXTENDED RELEASE ORAL at 17:33

## 2019-03-16 RX ADMIN — Medication 1 APPLICATION(S): at 17:33

## 2019-03-16 RX ADMIN — Medication 3 MILLILITER(S): at 12:51

## 2019-03-16 NOTE — PROVIDER CONTACT NOTE (OTHER) - ACTION/TREATMENT ORDERED:
As per NP Adele PEREZ to hold PO medications. No further intervention required at this time. Will continue to monitor.

## 2019-03-16 NOTE — PROGRESS NOTE ADULT - SUBJECTIVE AND OBJECTIVE BOX
SUBJECTIVE AND OBJECTIVE: tachypneic.  INTERVAL HPI/OVERNIGHT EVENTS: Grand-daughter Archana at bedside. Spoke to daughter Chantel over the phone who confirmed that they would like pt to be transferred to PCU for management of symptoms. Main goal is comfort care.     DNR on chart: Yes    Allergies    No Known Allergies    Intolerances    MEDICATIONS  (STANDING):  ALBUTerol/ipratropium for Nebulization 3 milliLiter(s) Nebulizer every 6 hours  aspirin  chewable 81 milliGRAM(s) Oral daily  BACItracin   Ointment 1 Application(s) Topical two times a day  dextrose 5%. 1000 milliLiter(s) (50 mL/Hr) IV Continuous <Continuous>  dextrose 5%. 1000 milliLiter(s) (50 mL/Hr) IV Continuous <Continuous>  dextrose 5%. 1000 milliLiter(s) (50 mL/Hr) IV Continuous <Continuous>  dextrose 50% Injectable 12.5 Gram(s) IV Push once  dextrose 50% Injectable 25 Gram(s) IV Push once  dextrose 50% Injectable 25 Gram(s) IV Push once  heparin  Injectable 5000 Unit(s) SubCutaneous every 12 hours  insulin lispro (HumaLOG) corrective regimen sliding scale.   SubCutaneous every 6 hours  metoprolol tartrate Injectable 5 milliGRAM(s) IV Push every 6 hours  petrolatum white Ointment 1 Application(s) Topical two times a day  piperacillin/tazobactam IVPB. 3.375 Gram(s) IV Intermittent every 12 hours  polyethylene glycol 3350 17 Gram(s) Oral daily  senna Syrup 10 milliLiter(s) Oral at bedtime  sodium chloride 0.9%. 1000 milliLiter(s) (50 mL/Hr) IV Continuous <Continuous>    MEDICATIONS  (PRN):  ALBUTerol    90 MICROgram(s) HFA Inhaler 2 Puff(s) Inhalation every 12 hours PRN asthma  dextrose 40% Gel 15 Gram(s) Oral once PRN Blood Glucose LESS THAN 70 milliGRAM(s)/deciliter  glucagon  Injectable 1 milliGRAM(s) IntraMuscular once PRN Glucose LESS THAN 70 milligrams/deciliter  morphine  - Injectable 0.5 milliGRAM(s) IV Push every 3 hours PRN dyspnea    ITEMS UNCHECKED ARE NOT PRESENT    PRESENT SYMPTOMS: [x ]Unable to obtain due to poor mentation   Source if other than patient:  [ ]Family   [ ]Team     Pain (Impact on QOL):    Location:  Minimal acceptable level (0-10 scale):                   Aggravating factors:  Quality:  Radiation:  Severity (0-10 scale):    Timing:    Dyspnea:                           [ ]Mild [ ]Moderate [ ]Severe  Anxiety:                             [ ]Mild [ ]Moderate [ ]Severe  Fatigue:                             [ ]Mild [ ]Moderate [ ]Severe  Nausea:                             [ ]Mild [ ]Moderate [ ]Severe  Loss of appetite:              [ ]Mild [ ]Moderate [ ]Severe  Constipation:                    [ ]Mild [ ]Moderate [ ]Severe    PAIN AD Score:	  [email or web address] (Ctrl + left click to view)    Other Symptoms:  [ ]All other review of systems negative     Karnofsky Performance Score/Palliative Performance Status Version 2: 20 %    http://palliative.info/resource_material/[email or web address].pdf    PHYSICAL EXAM:  Vital Signs Last 24 Hrs  T(C): 36.6 (16 Mar 2019 09:50), Max: 36.6 (16 Mar 2019 09:50)  T(F): 97.8 (16 Mar 2019 09:50), Max: 97.8 (16 Mar 2019 09:50)  HR: 70 (16 Mar 2019 10:30) (70 - 95)  BP: 130/78 (16 Mar 2019 09:50) (129/73 - 184/79)  BP(mean): --  RR: 18 (16 Mar 2019 09:50) (18 - 20)  SpO2: 99% (16 Mar 2019 10:30) (96% - 100%) I&O's Summary    15 Mar 2019 07:01  -  16 Mar 2019 07:00  --------------------------------------------------------  IN: 125 mL / OUT: 0 mL / NET: 125 mL    16 Mar 2019 07:01  -  16 Mar 2019 11:27  --------------------------------------------------------  IN: 0 mL / OUT: 0 mL / NET: 0 mL    GENERAL:  [ ]Alert  [ ]Oriented x   [ ]Lethargic  [ ]Cachexia  [ ]Unarousable  [ ]Verbal  [x ]Non-Verbal  Behavioral:   [ ] Anxiety  [x ] Delirium [ ] Agitation [ ] Other  HEENT:  [ ]Normal   [x ]Dry mouth   [ ]ET Tube/Trach  [ ]Oral lesions  PULMONARY:   [ ]Clear [ x]Tachypnea  [ ]Audible excessive secretions   [ ]Rhonchi        [ ]Right [ ]Left [ ]Bilateral  [ ]Crackles        [ ]Right [ ]Left [ ]Bilateral  [ ]Wheezing     [ ]Right [ ]Left [ ]Bilateral  CARDIOVASCULAR:    [ ]Regular [x]Irregular [ ]Tachy  [ ]Alfonso [ ]Murmur [ ]Other  GASTROINTESTINAL:  [x ]Soft  [ ]Distended   [ x]+BS  [x ]Non tender [ ]Tender  [ ]PEG [ ]OGT/ NGT   Last BM:    GENITOURINARY:  [ ]Normal [ ] Incontinent   [ ]Oliguria/Anuria   [ ]Sawant  MUSCULOSKELETAL:   [ ]Normal   [x ]Weakness  [ ]Bed/Wheelchair bound [ ]Edema  NEUROLOGIC:   [ ]No focal deficits  [x ] Cognitive impairment  [x ] Dysphagia [ ]Dysarthria [ ] Paresis [ ]Other   SKIN:   [ ]Normal   [ ]Pressure ulcer(s)  [ ]Rash    CRITICAL CARE:  [ ] Shock Present  [ ]Septic [ ]Cardiogenic [ ]Neurologic [ ]Hypovolemic  [ ]  Vasopressors [ ]  Inotropes   [ ] Respiratory failure present  [ ] Acute  [ ] Chronic [ ] Hypoxic  [ ] Hypercarbic [ ] Other  [ ] Other organ failure     GRIEF   [x ] Yes  [ ] No    LABS:                        11.0   4.64  )-----------( 202      ( 15 Mar 2019 09:19 )             36.6   03-16    144  |  100  |  11  ----------------------------<  119<H>  3.9   |  33<H>  |  0.61    Ca    9.4      16 Mar 2019 07:09    RADIOLOGY & ADDITIONAL STUDIES: reviewed.     Protein Calorie Malnutrition Present: [ ] yes [ ] no  [x ] PPSV2 < or = 30%  [ ] significant weight loss [ ] poor nutritional intake [ ] anasarca [ ] catabolic state Prealbumin, Serum: 7 mg/dL (03-08-19 @ 13:52)  Artificial Nutrition [ ]     REFERRALS:   [ ]Chaplaincy  [ ] Hospice  [ ]Child Life  [ ]Social Work  [ ]Case management [ ]Holistic Therapy   Goals of Care Document: Goals of Care Conversation - Personal Advance Directive [MOHAMUD Rico] (03-15-19 @ 17:09)

## 2019-03-16 NOTE — PROGRESS NOTE ADULT - SUBJECTIVE AND OBJECTIVE BOX
Patient is a 89y old  Female who presents with a chief complaint of hypercapnic resp failure (16 Mar 2019 11:25)      SUBJECTIVE / OVERNIGHT EVENTS:    patient seen and examined at bedside.    family in agreement to transfer to pcu    patient very somnolent this am     ROS:  14 point ROS negative in detail except stated as above    MEDICATIONS  (STANDING):  ALBUTerol/ipratropium for Nebulization 3 milliLiter(s) Nebulizer every 6 hours  aspirin  chewable 81 milliGRAM(s) Oral daily  BACItracin   Ointment 1 Application(s) Topical two times a day  dextrose 5%. 1000 milliLiter(s) (50 mL/Hr) IV Continuous <Continuous>  dextrose 5%. 1000 milliLiter(s) (50 mL/Hr) IV Continuous <Continuous>  dextrose 5%. 1000 milliLiter(s) (50 mL/Hr) IV Continuous <Continuous>  dextrose 50% Injectable 12.5 Gram(s) IV Push once  dextrose 50% Injectable 25 Gram(s) IV Push once  dextrose 50% Injectable 25 Gram(s) IV Push once  heparin  Injectable 5000 Unit(s) SubCutaneous every 12 hours  insulin lispro (HumaLOG) corrective regimen sliding scale.   SubCutaneous every 6 hours  metoprolol tartrate Injectable 5 milliGRAM(s) IV Push every 6 hours  petrolatum white Ointment 1 Application(s) Topical two times a day  piperacillin/tazobactam IVPB. 3.375 Gram(s) IV Intermittent every 12 hours  polyethylene glycol 3350 17 Gram(s) Oral daily  senna Syrup 10 milliLiter(s) Oral at bedtime  sodium chloride 0.9%. 1000 milliLiter(s) (50 mL/Hr) IV Continuous <Continuous>    MEDICATIONS  (PRN):  ALBUTerol    90 MICROgram(s) HFA Inhaler 2 Puff(s) Inhalation every 12 hours PRN asthma  dextrose 40% Gel 15 Gram(s) Oral once PRN Blood Glucose LESS THAN 70 milliGRAM(s)/deciliter  glucagon  Injectable 1 milliGRAM(s) IntraMuscular once PRN Glucose LESS THAN 70 milligrams/deciliter  morphine  - Injectable 0.5 milliGRAM(s) IV Push every 3 hours PRN dyspnea      CAPILLARY BLOOD GLUCOSE      POCT Blood Glucose.: 114 mg/dL (16 Mar 2019 06:27)  POCT Blood Glucose.: 107 mg/dL (16 Mar 2019 00:46)  POCT Blood Glucose.: 76 mg/dL (15 Mar 2019 17:03)  POCT Blood Glucose.: 107 mg/dL (15 Mar 2019 11:53)    I&O's Summary    15 Mar 2019 07:01  -  16 Mar 2019 07:00  --------------------------------------------------------  IN: 125 mL / OUT: 0 mL / NET: 125 mL    16 Mar 2019 07:01  -  16 Mar 2019 11:52  --------------------------------------------------------  IN: 0 mL / OUT: 0 mL / NET: 0 mL        PHYSICAL EXAM:  Vital Signs Last 24 Hrs  T(C): 36.6 (16 Mar 2019 09:50), Max: 36.6 (16 Mar 2019 09:50)  T(F): 97.8 (16 Mar 2019 09:50), Max: 97.8 (16 Mar 2019 09:50)  HR: 70 (16 Mar 2019 10:30) (70 - 95)  BP: 130/78 (16 Mar 2019 09:50) (129/73 - 184/79)  BP(mean): --  RR: 18 (16 Mar 2019 09:50) (18 - 18)  SpO2: 99% (16 Mar 2019 10:30) (96% - 100%)          LABS:                        11.0   4.64  )-----------( 202      ( 15 Mar 2019 09:19 )             36.6     03-16    144  |  100  |  11  ----------------------------<  119<H>  3.9   |  33<H>  |  0.61    Ca    9.4      16 Mar 2019 07:09                RADIOLOGY & ADDITIONAL TESTS:    Imaging Personally Reviewed:    Consultant(s) Notes Reviewed:      Care Discussed with Consultants/Other Providers:

## 2019-03-16 NOTE — PROGRESS NOTE ADULT - ASSESSMENT
89 year old female with PMH of Alzheimer's dementia, HTN, Afib; presented with hypercapnic respiratory failure secondary to pleural effusion s/p intubation/extubation and thoracentesis in MICU. Course complicated by encephalopathy and multi-organism bacteremia. Found to have RLL necrotizing PNA and colitis on CT abdomen.     Problem Selector:  PROBLEM DIAGNOSES  Problem: Acute respiratory failure with hypercapnia  Assessment and Plan: -S/p extubation in MICU and thoracentesis. CT abdomen shows RLL areas of cavitation, concerning for necrotizing PNA.   -C/w Zosyn for now, per ID recs, last dose  3/18/19  -noted to have elevated bicar on BMP, STAT VBG showed CO2 of 40   - Pt is DNR/DNI, started on BiPAP  -transfer to PCU  - started on morphine 0.5 mg IV q3h PRN for SOB.       Problem: Bacteremia  Assessment and Plan: Found to have Coagulase negative Staph, Enterococcus faecalis, Strep species on earlier cultures. Blood cultures subsequently cleared.   -CT abdomen shows colitis, which likely explains the multi-organism bacteremia.   -C/w Zosyn for now, per ID recs. for 2 week course of abx today (#12/14 days)   -Echo without any obvious vegetations.      Problem: Acute deep vein thrombosis (DVT) of left upper extremity after procedure  Assessment and Plan: DVT LE + with underling chronic Afib   - discussed AC risks and benefits in detail with patient's children and granddaughter,   - they would like to hold off on AC at this time. and c/w supporitve care     Problem: Encephalopathy  Assessment and Plan: Patient also with some acute encephalopathy on chronic dementia. Likely multifactorial due to hospitalization and infection, etc.   -Monitor mental status closely.   -Fall precautions,      Problem: Advanced care planning/counseling discussion  Assessment and Plan: Had long conversation with patient's granddaughter and daughters at bedside. Informed them of patient's guarded prognosis.  - The family do not want feeding tube, would like to pursue comfort feeding   -MOLST completed by them, patient now DNR.   - s/p family meeting today, family agreeable to Hospice,   - PCU transfer       Problem: Severe protein-calorie malnutrition  Assessment and Plan: Patient unable to participate with swallow eval due to poor participation.    - comfort feeding started        Problem: VANIA (acute kidney injury)  Assessment and Plan: resolved monitor BMP     Problem: Prophylactic measure  Assessment and Plan: Heparin SQ for DVT PPx.  -PT eval.  - prognosis guarded      Problem: Chronic atrial fibrillation  Assessment and Plan: Not on AC at home due to age and fall risk.    -C/w ASA when able to take PO.   -C/w IV metoprolo 5mg IV Q6H for elevated BP  -Was on digoxin at home, held here due to elevated level on admission. Level eventually came down      Problem: Pleural effusion  Assessment and Plan: 2/2 necrotizing PNA seen on CT. Culture with no growth but after antibiotics had already been initiated. Respiratory status is improved after tap.  -fluid cytopathology: neg for malignancy   -Monitor strict I's/O's and daily weights.

## 2019-03-16 NOTE — PROGRESS NOTE ADULT - NSICDXPROBLEM_GEN_ALL_CORE_FT
PROBLEM DIAGNOSES  Problem: Acute respiratory failure with hypoxemia  Assessment and Plan: On oxygen, BIPAP at night.  Now tachypneic.  On Morphine 0.5mg IV q3hrs PRN.  Receving between 1-2PRNs in 24hrs.   Depending on use will consider atc morphine.    Problem: Functional quadriplegia  Assessment and Plan: PPSV 20%  Needs assistance with all ADLs  Skin care    Problem: Alzheimer's dementia  Assessment and Plan: Fast >7C  Main goal is comfort care at this time.     Problem: Palliative care by specialist  Assessment and Plan: Pt is DNR/DNI  Surrogate is pts daughter Chantel Massey ([phone])  Main goal is comfort care, they would like to continue with abx.  Pleasure feeds.  In agreement with transfer to PCU for symptom management.   Pt to be transferred to PCU once bed is available.

## 2019-03-16 NOTE — PROGRESS NOTE ADULT - NSICDXPROBLEM_GEN_ALL_CORE_FT
PROBLEM DIAGNOSES  Problem: Acute respiratory failure with hypercapnia  Assessment and Plan: -S/p extubation in MICU and thoracentesis. CT abdomen shows RLL areas of cavitation, concerning for necrotizing PNA.   -C/w Zosyn for now, per ID recs, last dose  3/18/19  -noted to have elevated bicar on BMP, STAT VBG showed CO2 of 40   - Pt is DNR/DNI, started on BiPAP  - Hospice eval pendning today   - started on morphine 0.5 mg IV q3h PRN for SOB.       Problem: Bacteremia  Assessment and Plan: Found to have Coagulase negative Staph, Enterococcus faecalis, Strep species on earlier cultures. Blood cultures subsequently cleared.   -CT abdomen shows colitis, which likely explains the multi-organism bacteremia.   -C/w Zosyn for now, per ID recs. for 2 week course of abx today (#12/14 days)   -Echo without any obvious vegetations.      Problem: Acute deep vein thrombosis (DVT) of left upper extremity after procedure  Assessment and Plan: DVT LE + with underling chronic Afib   - discussed AC risks and benifits in detail with patient's children and granddaughter,   - they would like to hold off on AC at this time. and c/w supporitve care     Problem: Encephalopathy  Assessment and Plan: Patient also with some acute encephalopathy on chronic dementia. Likely multifactorial due to hospitalization and infection, etc.   -Monitor mental status closely.   -Fall precautions,      Problem: Advanced care planning/counseling discussion  Assessment and Plan: Had long conversation with patient's granddaughter and daughters at bedside. Informed them of patient's guarded prognosis.  - The family do not want feeding tube, would like to pursue comfort feeding   -MOLST completed by them, patient now DNR.   - s/p family meeting today, family agreeable to Hospice,   - Hospice eval pending today       Problem: Severe protein-calorie malnutrition  Assessment and Plan: Patient unable to participate with swallow eval due to poor participation.   - S&S reevaluated today, due to pt being sleepy and increased work of breathing, unable to assess  -discussed dysphagia in detail with family, they would like to pursue comfort feeding at this time, they have declined NGT, PEG tube.   - comfort feeding started  - meeting [facility] tomorrow       Problem: VANIA (acute kidney injury)  Assessment and Plan: resolved monitor BMP     Problem: Prophylactic measure  Assessment and Plan: Heparin SQ for DVT PPx.  -PT eval.  - prognosis guarded      Problem: Chronic atrial fibrillation  Assessment and Plan: Not on AC at home due to age and fall risk.    -C/w ASA when able to take PO.   -C/w IV metoprolo 5mg IV Q6H for elevated BP  -Was on digoxin at home, held here due to elevated level on admission. Level eventually came down. Consider resuming if rate not controlled.       Problem: Pleural effusion  Assessment and Plan: 2/2 necrotizing PNA seen on CT. Culture with no growth but after antibiotics had already been initiated. Respiratory status is improved after tap.  -fluid cytopathology: neg for malignancy   -Monitor strict I's/O's and daily weights.

## 2019-03-16 NOTE — PROVIDER CONTACT NOTE (OTHER) - ACTION/TREATMENT ORDERED:
As per NP Adele PEREZ to keep original PIV from 3/12 in place- order to be placed.Will report to night RN. Night RN to attempt placing new PIV. No further intervention required, will continue to monitor

## 2019-03-16 NOTE — PROVIDER CONTACT NOTE (OTHER) - BACKGROUND
pt admitted resp distress, dnr dni. order for morphine q 3 hrs labored breathing, pt on cpap/bipap at night.

## 2019-03-17 PROCEDURE — 99233 SBSQ HOSP IP/OBS HIGH 50: CPT

## 2019-03-17 RX ORDER — MORPHINE SULFATE 50 MG/1
0.5 CAPSULE, EXTENDED RELEASE ORAL EVERY 6 HOURS
Qty: 0 | Refills: 0 | Status: DISCONTINUED | OUTPATIENT
Start: 2019-03-17 | End: 2019-03-17

## 2019-03-17 RX ORDER — MORPHINE SULFATE 50 MG/1
0.5 CAPSULE, EXTENDED RELEASE ORAL EVERY 6 HOURS
Qty: 0 | Refills: 0 | Status: DISCONTINUED | OUTPATIENT
Start: 2019-03-17 | End: 2019-03-18

## 2019-03-17 RX ORDER — MORPHINE SULFATE 50 MG/1
0.5 CAPSULE, EXTENDED RELEASE ORAL
Qty: 0 | Refills: 0 | Status: DISCONTINUED | OUTPATIENT
Start: 2019-03-17 | End: 2019-03-18

## 2019-03-17 RX ORDER — METOPROLOL TARTRATE 50 MG
5 TABLET ORAL EVERY 6 HOURS
Qty: 0 | Refills: 0 | Status: DISCONTINUED | OUTPATIENT
Start: 2019-03-17 | End: 2019-03-18

## 2019-03-17 RX ORDER — SODIUM CHLORIDE 9 MG/ML
1000 INJECTION INTRAMUSCULAR; INTRAVENOUS; SUBCUTANEOUS
Qty: 0 | Refills: 0 | Status: DISCONTINUED | OUTPATIENT
Start: 2019-03-17 | End: 2019-03-18

## 2019-03-17 RX ADMIN — Medication 1 APPLICATION(S): at 05:29

## 2019-03-17 RX ADMIN — HEPARIN SODIUM 5000 UNIT(S): 5000 INJECTION INTRAVENOUS; SUBCUTANEOUS at 05:29

## 2019-03-17 RX ADMIN — MORPHINE SULFATE 0.5 MILLIGRAM(S): 50 CAPSULE, EXTENDED RELEASE ORAL at 19:51

## 2019-03-17 RX ADMIN — Medication 5 MILLIGRAM(S): at 00:49

## 2019-03-17 RX ADMIN — Medication 3 MILLILITER(S): at 00:49

## 2019-03-17 RX ADMIN — Medication 1 APPLICATION(S): at 18:40

## 2019-03-17 RX ADMIN — Medication 1 APPLICATION(S): at 18:39

## 2019-03-17 RX ADMIN — Medication 1 APPLICATION(S): at 05:30

## 2019-03-17 RX ADMIN — Medication 3 MILLILITER(S): at 11:54

## 2019-03-17 RX ADMIN — MORPHINE SULFATE 0.5 MILLIGRAM(S): 50 CAPSULE, EXTENDED RELEASE ORAL at 18:40

## 2019-03-17 RX ADMIN — Medication 3 MILLILITER(S): at 05:29

## 2019-03-17 RX ADMIN — PIPERACILLIN AND TAZOBACTAM 25 GRAM(S): 4; .5 INJECTION, POWDER, LYOPHILIZED, FOR SOLUTION INTRAVENOUS at 05:29

## 2019-03-17 RX ADMIN — Medication 3 MILLILITER(S): at 18:39

## 2019-03-17 RX ADMIN — MORPHINE SULFATE 0.5 MILLIGRAM(S): 50 CAPSULE, EXTENDED RELEASE ORAL at 11:54

## 2019-03-17 NOTE — PROGRESS NOTE ADULT - SUBJECTIVE AND OBJECTIVE BOX
Patient is a 89y old  Female who presents with a chief complaint of hypercapnic resp failure (16 Mar 2019 11:50)      SUBJECTIVE / OVERNIGHT EVENTS: Patient seen and examined at bedside. Appears comfortable, is trying to remove BIPAP.     ROS:  unable to obtain due to advanced dementia     Allergies    No Known Allergies    Intolerances        MEDICATIONS  (STANDING):  ALBUTerol/ipratropium for Nebulization 3 milliLiter(s) Nebulizer every 6 hours  BACItracin   Ointment 1 Application(s) Topical two times a day  morphine  - Injectable 0.5 milliGRAM(s) IV Push every 6 hours  petrolatum white Ointment 1 Application(s) Topical two times a day  senna Syrup 10 milliLiter(s) Oral at bedtime  sodium chloride 0.9%. 1000 milliLiter(s) (50 mL/Hr) IV Continuous <Continuous>  sodium chloride 0.9%. 1000 milliLiter(s) (10 mL/Hr) IV Continuous <Continuous>    MEDICATIONS  (PRN):  ALBUTerol    90 MICROgram(s) HFA Inhaler 2 Puff(s) Inhalation every 12 hours PRN asthma  metoprolol tartrate Injectable 5 milliGRAM(s) IV Push every 6 hours PRN systolic BP > 150 and/or HR > 120  morphine  - Injectable 0.5 milliGRAM(s) IV Push every 1 hour PRN dyspnea      Vital Signs Last 24 Hrs  T(C): 36.4 (17 Mar 2019 09:00), Max: 36.5 (16 Mar 2019 14:04)  T(F): 97.6 (17 Mar 2019 09:00), Max: 97.7 (16 Mar 2019 14:04)  HR: 80 (17 Mar 2019 09:00) (63 - 84)  BP: 133/62 (17 Mar 2019 09:00) (97/48 - 147/68)  BP(mean): --  RR: 19 (17 Mar 2019 09:00) (18 - 24)  SpO2: 100% (17 Mar 2019 09:00) (88% - 100%)  CAPILLARY BLOOD GLUCOSE      POCT Blood Glucose.: 99 mg/dL (17 Mar 2019 06:10)  POCT Blood Glucose.: 123 mg/dL (17 Mar 2019 00:42)  POCT Blood Glucose.: 130 mg/dL (16 Mar 2019 17:30)  POCT Blood Glucose.: 146 mg/dL (16 Mar 2019 12:18)    I&O's Summary    16 Mar 2019 07:01  -  17 Mar 2019 07:00  --------------------------------------------------------  IN: 0 mL / OUT: 0 mL / NET: 0 mL        PHYSICAL EXAM:  GENERAL: NAD, frail, + BiPAP   HEAD:  Atraumatic, Normocephalic  EYES: EOMI, PERRLA, conjunctiva and sclera clear  CHEST/LUNG: Clear to auscultation bilaterally; No wheeze  HEART: Regular rate and rhythm; No murmurs, rubs, or gallops  ABDOMEN: Soft, Nontender, Bowel sounds present,    EXTREMITIES:  2+ Peripheral Pulses, No clubbing, cyanosis, or edema  NEUROLOGY: somnolent   SKIN: No rashes or lesions    LABS:                        11.2   3.82  )-----------( 217      ( 16 Mar 2019 12:23 )             38.1     03-16    144  |  100  |  11  ----------------------------<  119<H>  3.9   |  33<H>  |  0.61    Ca    9.4      16 Mar 2019 07:09                RADIOLOGY & ADDITIONAL TESTS:    Consultant(s) Notes Reviewed:  Palliative care     Care Discussed with Consultants/Other Providers: Palliative care     Case Discussed with Ebonie Giordano at bedside ([phone])

## 2019-03-17 NOTE — PROGRESS NOTE ADULT - SUBJECTIVE AND OBJECTIVE BOX
Geriatric and Palliative Care Unit Progress Note [ x] Hospice Progress Note [ ]     HPI:  89 yr old female with PMHx of alzhiemer, HTN, Afib, THN, CHF (last documented EF from 2015 of 62%), Asthma, cholecystectomy who presents to ED this morning from home after developing progressive SOB with lethargy over 3 to 4 days with development of productive cough over past 24 hr. Family endorses pt with increase lower extremity edema with Lt>Rt over past few weeks initially treated with lasix changed to spironolactone last week.        In E.D. pt found to have Vph 7.21, vPCO2 104, placed on bipap therapy[place] [address]. Pt received lasix 40 mg IVP, started on vanco/zosyn/zithromax.          Consult called for hypercapnic resp failure 2/2 to r/o CAP vs ADHF    Indication for Palliative Care Unit Services: Actively dying, symptom management.     ADVANCE DIRECTIVES:    DNR Yes    MOLST  [x ] YES [ ] NO                      [ ] Completed  Health Care Proxy [ ] YES  [x ] NO   [ ] Completed  Living Will  [ ] YES [x ] NO             [x ] Surrogate  [ ] HCP  [ ] Guardian:                                                                  Phone#:  Chantel Massey #[phone]    Allergies    No Known Allergies    Intolerances    MEDICATIONS  (STANDING):  ALBUTerol/ipratropium for Nebulization 3 milliLiter(s) Nebulizer every 6 hours  BACItracin   Ointment 1 Application(s) Topical two times a day  morphine  - Injectable 0.5 milliGRAM(s) IV Push every 6 hours  petrolatum white Ointment 1 Application(s) Topical two times a day  senna Syrup 10 milliLiter(s) Oral at bedtime  sodium chloride 0.9%. 1000 milliLiter(s) (50 mL/Hr) IV Continuous <Continuous>  sodium chloride 0.9%. 1000 milliLiter(s) (10 mL/Hr) IV Continuous <Continuous>    MEDICATIONS  (PRN):  ALBUTerol    90 MICROgram(s) HFA Inhaler 2 Puff(s) Inhalation every 12 hours PRN asthma  metoprolol tartrate Injectable 5 milliGRAM(s) IV Push every 6 hours PRN systolic BP > 150 and/or HR > 120  morphine  - Injectable 0.5 milliGRAM(s) IV Push every 1 hour PRN dyspnea    PRESENT SYMPTOMS: Unable to assess due to MS.   Source: [ ] Patient   [ ] Family   [ ] Team     Pain:                        [ ] No [ ] Yes             [ ] Mild [ ] Moderate [ ] Severe    Onset -  Location -  Duration -  Character -  Alleviating/Aggravating -  Radiation -  Timing -    Dyspnea:                [ ] No [ ] Yes             [ ] Mild [ ] Moderate [ ] Severe    Anxiety:                  [ ] No [ ] Yes             [ ] Mild [ ] Moderate [ ] Severe    Fatigue:                  [ ] No [ ] Yes             [ ] Mild [ ] Moderate [ ] Severe    Nausea:                  [ ] No [ ] Yes             [ ] Mild [ ] Moderate [ ] Severe    Loss of appetite:   [ ] No [ ] Yes             [ ] Mild [ ] Moderate [ ] Severe    Constipation:        [ ] No [ ] Yes             [ ] Mild [ ] Moderate [ ] Severe    Other Symptoms:  [ ] All other review of systems negative   [x ] Unable to obtain due to poor mentation     Karnofsky Performance Score/Palliative Performance Status Version 2: 10 %    PHYSICAL EXAM:  Vital Signs Last 24 Hrs  T(C): 36.4 (17 Mar 2019 09:00), Max: 36.4 (16 Mar 2019 17:26)  T(F): 97.6 (17 Mar 2019 09:00), Max: 97.6 (16 Mar 2019 17:26)  HR: 80 (17 Mar 2019 09:00) (63 - 81)  BP: 133/62 (17 Mar 2019 09:00) (97/48 - 147/68)  BP(mean): --  RR: 19 (17 Mar 2019 09:00) (18 - 24)  SpO2: 100% (17 Mar 2019 09:00) (88% - 100%) I&O's Summary    16 Mar 2019 07:01  -  17 Mar 2019 07:00  --------------------------------------------------------  IN: 0 mL / OUT: 0 mL / NET: 0 mL    General:  [ ] Alert  [ ] Oriented x      [ x] Lethargic  [ ] Agitated   [ ] Cachexia   [ ] Unarousable  [ ] Verbal  [x ] Non-Verbal    HEENT: on BIPAP  [ ] Normal   [x ] Dry mouth   [ ] ET Tube    [ ] Trach  [ ] Oral lesions    Lungs:   [ ] Clear [x ] Tachypnea  [ ] Audible excessive secretions   [ ] Rhonchi        [ ] Right [ ] Left [ ] Bilateral  [ ] Crackles        [ ] Right [ ] Left [ ] Bilateral  [ ] Wheezing     [ ] Right [ ] Left [ ] Bilateral  [ ] Respiratory failure [ ] Acute [ ] Chronic [ ] Hypoxemic [ ] Hypercarbic [ ] Other    Cardiovascular:   [x ] Regular [ ] Irregular [ ] Tachycardia   [ ] Bradycardia  [ ] Murmur [ ] Other  [ ] Shock [ ] Septic [ ] Hypovolemic [ ] Neurogenic [ ] Cardiogenic   [ ] Vasopressors [ ] Inotrophs    Abdomen:   [ x] Soft  [ ] Distended   [x ] +BS  [ ] Non tender [ ] Tender  [ ]PEG   [ ]OGT/ NGT   Last BM:     [ ] Other    Genitourinary:  [ ] Normal [x ] Incontinent   [ ] Oliguria/Anuria   [ ] Sawant  [ ] Other       Musculoskeletal:    [ ] Normal   [ ] Weakness  [ ] Edema  [x ] Bedbound/Wheelchair bound [ ]  Ambulatory [ ] with [ ] without assistance [ ] Functional quadriplegia    Neurological: [ ] No focal deficits  [ x] Cognitive impairment  [ ] Dysphagia [ ] Dysarthria [ ] Paresis [ ] Other   [ ] Brain compression  [ ] Cerebral edema [ ] Encephalopathy    Skin: [ ] Normal   [ ] Ulcer(s) type [ ] Diabetic [ ] Pressure [ ] Other   Stage        POA [ ]  Yes [ ]  No       [ ] Rash    LABS:                        11.2   3.82  )-----------( 217      ( 16 Mar 2019 12:23 )             38.1     03-16    144  |  100  |  11  ----------------------------<  119<H>  3.9   |  33<H>  |  0.61    Ca    9.4      16 Mar 2019 07:09    Protein Calorie Malnutrition: [ ] Mild [ ] Moderate [ ] Severe    Oral Intake: [ ] Unable/mouth care only [ ] Minimal [ ] Moderate [ ] Full Capability  Diet: [ ] NPO [ ] Tube feeds [ ] TPN [ ] Other     RADIOLOGY & ADDITIONAL STUDIES: reviewed.     REFERRALS:   [ ] Chaplaincy  [ ] Hospice Care  [ ] Child Life  [ ] Social Work  [ ] Case management [ ] Nutrition [ ] Holistic Therapy [ ] Wound Care [ ]Physical Therapy [ ] Other [ ]See goals of care note in [place]

## 2019-03-17 NOTE — PROGRESS NOTE ADULT - NSICDXPROBLEM_GEN_ALL_CORE_FT
PROBLEM DIAGNOSES  Problem: Acute respiratory failure with hypoxemia  Assessment and Plan: Today in AM, unable to have BIPAP removed, continued through daytime (before only bipap at night).   Discussed with family treating symptoms with atc medications and then attempting to take off BIPAP.   Will start Morphine 0.5mg IV q6hrs atc and continue with Morphine 1mg IV q3hrs PRN.    Problem: Functional quadriplegia  Assessment and Plan: PPSV 20%  Needs assistance with all ADLs  Skin care    Problem: Alzheimer's dementia  Assessment and Plan: Fast >7C  Main goal is comfort care at this time.     Problem: Palliative care by specialist  Assessment and Plan: Pt is DNR/DNI  Surrogate is pts daughter Chantel Massey ([phone])  Main goal is comfort care, they would like to continue with abx.  Pleasure feeds.  Will d/c BIPAP today and focus on symptoms.

## 2019-03-17 NOTE — PROGRESS NOTE ADULT - NSICDXPROBLEM_GEN_ALL_CORE_FT
PROBLEM DIAGNOSES  Problem: Acute respiratory failure with hypercapnia  Assessment and Plan: -S/p extubation in MICU and thoracentesis. CT abdomen shows RLL areas of cavitation, concerning for necrotizing PNA.   -C/w Zosyn for now, per ID recs, last dose  3/18/19  - Pt is DNR/DNI, placed on Bipap Firady due to wrosening hypercapnia  - Hospice eval appracited, Pt tx to PCU for sym managment   - started on morphine 0.5 mg IV q3h PRN for SOB.       Problem: Bacteremia  Assessment and Plan: Found to have Coagulase negative Staph, Enterococcus faecalis, Strep species on earlier cultures. Blood cultures subsequently cleared.   -CT abdomen shows colitis, which likely explains the multi-organism bacteremia.   -C/w Zosyn for now, per ID recs. for 2 week course of abx today   -Echo without any obvious vegetations.      Problem: Acute deep vein thrombosis (DVT) of left upper extremity after procedure  Assessment and Plan: DVT LE + with underling chronic Afib   - discussed AC risks and benifits in detail with patient's children and granddaughter,   - they would like to hold off on AC at this time. and c/w supporitve care     Problem: Encephalopathy  Assessment and Plan: Patient also with some acute encephalopathy on chronic dementia. Likely multifactorial due to hospitalization and infection, etc.   -Monitor mental status closely.   -Fall precautions,      Problem: Advanced care planning/counseling discussion  Assessment and Plan: Had long conversation with patient's granddaughter and daughters at bedside. Informed them of patient's guarded prognosis.  - The family do not want feeding tube, would like to pursue comfort feeding   -MOLST completed by them, patient now DNR.   - s/p family meeting today, family agreeable to Hospice,   - Hospice eval appracited pt tx to PCUfor sym manamgment       Problem: Severe protein-calorie malnutrition  Assessment and Plan: Patient unable to participate with swallow eval due to poor participation.   - S&S reevaluated today, due to pt being sleepy and increased work of breathing, unable to assess  -discussed dysphagia in detail with family, they would like to pursue comfort feeding at this time, they have declined NGT, PEG tube.   - comfort feeding started  - meeting [facility] tomorrow       Problem: VANIA (acute kidney injury)  Assessment and Plan: resolved monitor BMP     Problem: Prophylactic measure  Assessment and Plan: Heparin SQ for DVT PPx.  -PT eval.  - prognosis guarded      Problem: Chronic atrial fibrillation  Assessment and Plan: Not on AC at home due to age and fall risk.    -C/w ASA when able to take PO.   -C/w IV metoprolo 5mg IV Q6H for elevated BP  -Was on digoxin at home, held here due to elevated level on admission. Level eventually came down. Consider resuming if rate not controlled.       Problem: Pleural effusion  Assessment and Plan: 2/2 necrotizing PNA seen on CT. Culture with no growth but after antibiotics had already been initiated. Respiratory status is improved after tap.  -fluid cytopathology: neg for malignancy   -Monitor strict I's/O's and daily weights.

## 2019-03-17 NOTE — CHART NOTE - NSCHARTNOTEFT_GEN_A_CORE
MEDICINE NP    KAUR WANG  89y Female    Patient is a 89y old  Female who presents with a chief complaint of hypercapnic resp failure (16 Mar 2019 11:50)       > Event Summary:  Notified by RN, Patient on BIPAP w/ labored breathing, and Resp Therapist reports she's not taking in volume.  Patient is a 89 y/o Female DNR/DNI with Hypercapnic Respiratory Failure, on BIPAP and Morphine iv prn.   Patient seen at bedside, w/ granddaughter present stating concern for decreased SpO2 to 88% and "bluish" color of b/l toes R>L.  States toes were bluish on admission, improved and now back to same.        -Vital Signs Last 24 Hrs  T(C): 36.4 (17 Mar 2019 00:48), Max: 36.6 (16 Mar 2019 09:50)  T(F): 97.5 (17 Mar 2019 00:48), Max: 97.8 (16 Mar 2019 09:50)  HR: 80 (17 Mar 2019 00:48) (70 - 85)  BP: 114/69 (17 Mar 2019 00:48) (114/69 - 184/79)  RR: 18 (17 Mar 2019 00:48) (18 - 21)  SpO2: 100% (17 Mar 2019 00:48) (88% - 100%)    > Physical Assessment:  General: Patient is lethargic, non-responsive to stimuli w/ BIPAP in use.  CV: +S1S2, RRR.  +trace peripheral edema  Respiratory: Even, unlabored.  +Tachypnea 26,  CTA B/L.    Abdomen:  +BS.  Soft, NT, ND.  No palpable mass  Extremities: +b/l feet cool to touch, right foot plantar toes with bluish color, and blanchable. +2DP/PT  b/l.    Skin: +open scab blister to right dorsal 4 toe.          > Assessment & Plan:  - HPI:  Patient is a 89y/o Female, PMH of Alzheimer's Dementia, HTN, Afib, CHF (last documented EF from 2015 of 62%), Asthma.  Admitted with Hypercapnic Respiratory Failure  2/2 Pleural Effusion s/p intubation/extubation 3/6 and Thoracentesis in MICU, and transitioned to BIPAP.   Hospital Course complicated by Encephalopathy and multi-organism Bacteremia. Found to have RLL necrotizing PNA and Colitis on CT abdomen, on IV Zosyn.  Failed S&S evaluation 3/8, and progressive decline over the past few months, Palliative was consulted to help discuss overall GOC.  With tachypnea /elevated Bicarb /  VBG showed CO2 of 40, BIPAP continues and Morphine iv prn.  Patient is DNR /DNI.  Now Hypoxia, and Cyanosis of extremities.     1) Hypoxia : Likely disease process as above  -D/w Resp Therapist, BIPAP settings adjusted for Tidal Volume from <100 to 120-160, SpO2 back to 100%.    -Continuous Pulse Ox  -c/w Morphine prn and comfort measures    2) Cyanosis of Extremities -Likely disease process above  -Bedside dopplers with +Rt. DP and +Lt PT :  neurovascular checks q4  -Warm compresses to extremities  -Oxygen adjusted above, c/w monitoring and comfort measures.     D/w HICS Dr. Senior, no further recommendations        Jeanne Thomas, JIM-BC  Medicine Department  #[id number]

## 2019-03-18 VITALS
HEART RATE: 93 BPM | TEMPERATURE: 99 F | OXYGEN SATURATION: 96 % | SYSTOLIC BLOOD PRESSURE: 90 MMHG | DIASTOLIC BLOOD PRESSURE: 43 MMHG

## 2019-03-18 PROCEDURE — 99233 SBSQ HOSP IP/OBS HIGH 50: CPT | Mod: GC

## 2019-03-18 PROCEDURE — 82570 ASSAY OF URINE CREATININE: CPT

## 2019-03-18 PROCEDURE — 87186 SC STD MICRODIL/AGAR DIL: CPT

## 2019-03-18 PROCEDURE — 83735 ASSAY OF MAGNESIUM: CPT

## 2019-03-18 PROCEDURE — 31500 INSERT EMERGENCY AIRWAY: CPT

## 2019-03-18 PROCEDURE — 83935 ASSAY OF URINE OSMOLALITY: CPT

## 2019-03-18 PROCEDURE — 96374 THER/PROPH/DIAG INJ IV PUSH: CPT | Mod: XU

## 2019-03-18 PROCEDURE — 83615 LACTATE (LD) (LDH) ENZYME: CPT

## 2019-03-18 PROCEDURE — 84100 ASSAY OF PHOSPHORUS: CPT

## 2019-03-18 PROCEDURE — 87150 DNA/RNA AMPLIFIED PROBE: CPT

## 2019-03-18 PROCEDURE — 71045 X-RAY EXAM CHEST 1 VIEW: CPT

## 2019-03-18 PROCEDURE — 84145 PROCALCITONIN (PCT): CPT

## 2019-03-18 PROCEDURE — 87798 DETECT AGENT NOS DNA AMP: CPT

## 2019-03-18 PROCEDURE — 82042 OTHER SOURCE ALBUMIN QUAN EA: CPT

## 2019-03-18 PROCEDURE — 87206 SMEAR FLUORESCENT/ACID STAI: CPT

## 2019-03-18 PROCEDURE — 84132 ASSAY OF SERUM POTASSIUM: CPT

## 2019-03-18 PROCEDURE — 89051 BODY FLUID CELL COUNT: CPT

## 2019-03-18 PROCEDURE — 94003 VENT MGMT INPAT SUBQ DAY: CPT

## 2019-03-18 PROCEDURE — 80048 BASIC METABOLIC PNL TOTAL CA: CPT

## 2019-03-18 PROCEDURE — 87015 SPECIMEN INFECT AGNT CONCNTJ: CPT

## 2019-03-18 PROCEDURE — 87075 CULTR BACTERIA EXCEPT BLOOD: CPT

## 2019-03-18 PROCEDURE — 71250 CT THORAX DX C-: CPT

## 2019-03-18 PROCEDURE — 94660 CPAP INITIATION&MGMT: CPT

## 2019-03-18 PROCEDURE — 87102 FUNGUS ISOLATION CULTURE: CPT

## 2019-03-18 PROCEDURE — 93970 EXTREMITY STUDY: CPT

## 2019-03-18 PROCEDURE — 88305 TISSUE EXAM BY PATHOLOGIST: CPT

## 2019-03-18 PROCEDURE — 93306 TTE W/DOPPLER COMPLETE: CPT

## 2019-03-18 PROCEDURE — 82550 ASSAY OF CK (CPK): CPT

## 2019-03-18 PROCEDURE — 94640 AIRWAY INHALATION TREATMENT: CPT

## 2019-03-18 PROCEDURE — 99291 CRITICAL CARE FIRST HOUR: CPT | Mod: 25

## 2019-03-18 PROCEDURE — 96375 TX/PRO/DX INJ NEW DRUG ADDON: CPT | Mod: XU

## 2019-03-18 PROCEDURE — 85730 THROMBOPLASTIN TIME PARTIAL: CPT

## 2019-03-18 PROCEDURE — 82553 CREATINE MB FRACTION: CPT

## 2019-03-18 PROCEDURE — 84134 ASSAY OF PREALBUMIN: CPT

## 2019-03-18 PROCEDURE — 82962 GLUCOSE BLOOD TEST: CPT

## 2019-03-18 PROCEDURE — 82803 BLOOD GASES ANY COMBINATION: CPT

## 2019-03-18 PROCEDURE — 97116 GAIT TRAINING THERAPY: CPT

## 2019-03-18 PROCEDURE — 87633 RESP VIRUS 12-25 TARGETS: CPT

## 2019-03-18 PROCEDURE — 97162 PT EVAL MOD COMPLEX 30 MIN: CPT

## 2019-03-18 PROCEDURE — 83605 ASSAY OF LACTIC ACID: CPT

## 2019-03-18 PROCEDURE — 84540 ASSAY OF URINE/UREA-N: CPT

## 2019-03-18 PROCEDURE — 82330 ASSAY OF CALCIUM: CPT

## 2019-03-18 PROCEDURE — 51702 INSERT TEMP BLADDER CATH: CPT | Mod: XU

## 2019-03-18 PROCEDURE — 82947 ASSAY GLUCOSE BLOOD QUANT: CPT

## 2019-03-18 PROCEDURE — 80202 ASSAY OF VANCOMYCIN: CPT

## 2019-03-18 PROCEDURE — 80162 ASSAY OF DIGOXIN TOTAL: CPT

## 2019-03-18 PROCEDURE — 83036 HEMOGLOBIN GLYCOSYLATED A1C: CPT

## 2019-03-18 PROCEDURE — 84300 ASSAY OF URINE SODIUM: CPT

## 2019-03-18 PROCEDURE — 84484 ASSAY OF TROPONIN QUANT: CPT

## 2019-03-18 PROCEDURE — 85014 HEMATOCRIT: CPT

## 2019-03-18 PROCEDURE — 84295 ASSAY OF SERUM SODIUM: CPT

## 2019-03-18 PROCEDURE — 87040 BLOOD CULTURE FOR BACTERIA: CPT

## 2019-03-18 PROCEDURE — 83986 ASSAY PH BODY FLUID NOS: CPT

## 2019-03-18 PROCEDURE — 85027 COMPLETE CBC AUTOMATED: CPT

## 2019-03-18 PROCEDURE — 81001 URINALYSIS AUTO W/SCOPE: CPT

## 2019-03-18 PROCEDURE — 87449 NOS EACH ORGANISM AG IA: CPT

## 2019-03-18 PROCEDURE — 93005 ELECTROCARDIOGRAM TRACING: CPT | Mod: XU

## 2019-03-18 PROCEDURE — 74177 CT ABD & PELVIS W/CONTRAST: CPT

## 2019-03-18 PROCEDURE — 85610 PROTHROMBIN TIME: CPT

## 2019-03-18 PROCEDURE — 94799 UNLISTED PULMONARY SVC/PX: CPT

## 2019-03-18 PROCEDURE — 83880 ASSAY OF NATRIURETIC PEPTIDE: CPT

## 2019-03-18 PROCEDURE — 80053 COMPREHEN METABOLIC PANEL: CPT

## 2019-03-18 PROCEDURE — 88112 CYTOPATH CELL ENHANCE TECH: CPT

## 2019-03-18 PROCEDURE — 87205 SMEAR GRAM STAIN: CPT

## 2019-03-18 PROCEDURE — 87581 M.PNEUMON DNA AMP PROBE: CPT

## 2019-03-18 PROCEDURE — 82435 ASSAY OF BLOOD CHLORIDE: CPT

## 2019-03-18 PROCEDURE — 87086 URINE CULTURE/COLONY COUNT: CPT

## 2019-03-18 PROCEDURE — 87070 CULTURE OTHR SPECIMN AEROBIC: CPT

## 2019-03-18 PROCEDURE — 97110 THERAPEUTIC EXERCISES: CPT

## 2019-03-18 PROCEDURE — 87116 MYCOBACTERIA CULTURE: CPT

## 2019-03-18 PROCEDURE — 82945 GLUCOSE OTHER FLUID: CPT

## 2019-03-18 PROCEDURE — 87486 CHLMYD PNEUM DNA AMP PROBE: CPT

## 2019-03-18 PROCEDURE — 84157 ASSAY OF PROTEIN OTHER: CPT

## 2019-03-18 PROCEDURE — 94002 VENT MGMT INPAT INIT DAY: CPT

## 2019-03-18 PROCEDURE — 92610 EVALUATE SWALLOWING FUNCTION: CPT

## 2019-03-18 RX ORDER — ROBINUL 0.2 MG/ML
0.4 INJECTION INTRAMUSCULAR; INTRAVENOUS EVERY 6 HOURS
Qty: 0 | Refills: 0 | Status: DISCONTINUED | OUTPATIENT
Start: 2019-03-18 | End: 2019-03-18

## 2019-03-18 RX ADMIN — Medication 1 APPLICATION(S): at 05:32

## 2019-03-18 RX ADMIN — MORPHINE SULFATE 0.5 MILLIGRAM(S): 50 CAPSULE, EXTENDED RELEASE ORAL at 00:26

## 2019-03-18 RX ADMIN — MORPHINE SULFATE 0.5 MILLIGRAM(S): 50 CAPSULE, EXTENDED RELEASE ORAL at 05:32

## 2019-03-18 RX ADMIN — MORPHINE SULFATE 0.5 MILLIGRAM(S): 50 CAPSULE, EXTENDED RELEASE ORAL at 03:49

## 2019-03-18 RX ADMIN — MORPHINE SULFATE 0.5 MILLIGRAM(S): 50 CAPSULE, EXTENDED RELEASE ORAL at 02:20

## 2019-03-18 NOTE — DISCHARGE NOTE FOR THE EXPIRED PATIENT - SECONDARY DIAGNOSIS.
Late onset Alzheimer's disease without behavioral disturbance Aspiration pneumonia of right lower lobe due to gastric secretions

## 2019-03-18 NOTE — PROGRESS NOTE ADULT - REASON FOR ADMISSION
hypercapnic resp failure
hypercapnic respiratory failure with Left sided Pleural Effusion s/p Thoracentesis
hypercapnic resp failure

## 2019-03-18 NOTE — PROGRESS NOTE ADULT - NSICDXPROBLEM_GEN_ALL_CORE_FT
PROBLEM DIAGNOSES  Problem: Labored breathing  Assessment and Plan: Terminal process.  Turn and position for drainage and comfort.  Morphine PRN sx.      Problem: Alzheimer's dementia  Assessment and Plan: FAST 7E  Supportive care    Problem: Functional quadriplegia  Assessment and Plan: PPSV@ 10%.  Total dependence of ADL's    Problem: Palliative care by specialist  Assessment and Plan: Met with family at bedside.  Pt is actively dying.   Educated as to what to expect.  Questions answered.  Emotional support provided.

## 2019-03-18 NOTE — PROVIDER CONTACT NOTE (OTHER) - DATE AND TIME:
08-Mar-2019 12:10
12-Mar-2019 15:36
13-Mar-2019 12:25
07-Mar-2019 15:04
07-Mar-2019 18:39
11-Mar-2019 06:15
12-Mar-2019 00:00
12-Mar-2019 02:08
12-Mar-2019 06:56
12-Mar-2019 12:00
12-Mar-2019 12:00
12-Mar-2019 12:12
12-Mar-2019 12:50
12-Mar-2019 17:00
16-Mar-2019 17:30
16-Mar-2019 20:20
18-Mar-2019 10:00

## 2019-03-18 NOTE — DISCHARGE NOTE FOR THE EXPIRED PATIENT - HOSPITAL COURSE
89F with PMH of Alzheimer's, HTN, AF ( not on AC), CHF (last documented EF from  of 62%), asthma, cholecystectomy who presented to ED with progressive lethargy and dyspnea over a 3-4 day period, and worsening LE edema. Found to have hypercapnic respiratory failure and intubated; also underwent thoracentesis (3/6) for L sided effusion, likely related to HFrEF exacerbation +/- PNA.  Patient treated aggressively including intubation but continued to fail.  Patient transferred to PCU for management of dyspnea and end of life care.  Patient  on 3 18 19 with family at bedside.

## 2019-03-18 NOTE — PROVIDER CONTACT NOTE (OTHER) - REASON
/74
/74
PIV in for 96 hours
PO medication
PO medication
Patient 
Pt afternoon FS 68, repeat  FS 67
Pt having decreased urine output
Pt is DTV incontinent X 3 today.
Pt is hypertensive
Pt is not appropriate for dysphagia screening at bedside
Pt upper left arm is very edematous
Pt's FS was 125 after hypoglycemic protocol initiated.
pt blister on R foot. pt labored breathing.
pt has IV in >72 hours
/78
pt has IV in >72 hours

## 2019-03-18 NOTE — PROVIDER CONTACT NOTE (OTHER) - RECOMMENDATIONS
ASA suppository
NP notified and aware.
NP notified, can 0000 metoprolol be administered?
Notify NP.
PA notified
Bladder sacn
NP notified and aware
NP notified.
No intervention required as per NP
Notify NP about repeat FS after protocol intiated.
Notify NP. Administer standing dose of anti-hypertensive medication
Notify NP. Initiate hypoglycemic protocol.
Patient pronounced dead @ 0985, family at bedside.
rn wanted to make np aware. rn recommends np come to bedside to assess pt/answer family qs.

## 2019-03-18 NOTE — PROVIDER CONTACT NOTE (OTHER) - ASSESSMENT
PIV is a right 22G from 3/8, PIV is WDL- flushes without resistance, no redness, pain or swelling noted at IV site. Dressing is clean, dry & intact.
Pt awake in recliner, A&Ox0 (baseline), no s/s of distress noted. /78, HR 84, RR18, SpO2 on 2LNC 94% T 97.5. VS taken post chest pt with respiratory. Administered standing dose of anti-hypertensive medication as prescribed.
Pt is not very commutative, Pt doesn't seem in distress/ pain. All other VSS
Pt not c/o pain on arm site, no IV on that site.
IV is WDL, flushes, no redness, pain or swelling at IV site
No response to external stimuli.   No spontaneous respirations  Np apical heart rate  Negative pupillary response to light
Pt asleep in bed, pt lethargic- will open eyes to voice and/or gentle touch/shake. VSS. Pt afternoon FS 68, repeat  FS 67.
Pt asleep in bed, pt lethargic- will open eyes with gentle touch/shake. RN not comfortable giving PO medication d/t patient's LOC & risk for aspiration.
Pt asleep in bed, pt lethargic- will open eyes with gentle touch/shake. RN not comfortable giving PO medication d/t patient's LOC & risk for aspiration.
Pt awake in bed, A&Ox0 (baseline), no s/s of distress noted. /74, HR 67, RR18, SpO2 on 2LNC 100%, T 97.4.
Pt awake in bed, pt will open eyes to voice. .
Pt is DTV incontinent X 3 today.. Bladder is not distended. Bladder scan reveled 100 to 150 cc urine.
Pt is in no acute distress, all other VSS
Pt is not appropriate for dysphagia screening at bedside. Pt is lethargic and unable to follow commands
Pt with 22G from 3/12, PIV remains intact & WDL, flushing without difficulty, no leak, redness or edema noted around PIV site. Dressing was changed, dressing CDI, site cling wrapped for pt's safety.
VSS, no acute change in condition. Pt had 1 incontinent episode, otherwise about 150 cc urine output
pt new blister on r foot. pt labored breathing. day nurse states pt has been having this breathing all day even with morphine. pt remains lethargic. not responsive to voice. unable to assess orientation. granddaughter at bedside concerned about breathing, resp to bedside to place cpap per family request. Respiratory states pt not taking in volume, respiratory recommends no further intervention since pt is DNR/DNI

## 2019-03-18 NOTE — PROGRESS NOTE ADULT - SUBJECTIVE AND OBJECTIVE BOX
GAP TEAM PALLIATIVE CARE UNIT PROGRESS NOTE:      [  ] Patient on hospice program.    INDICATION FOR PALLIATIVE CARE UNIT SERVICES:    INTERVAL HPI/OVERNIGHT EVENTS:    DNR on chart: Yes    Allergies    No Known Allergies    Intolerances    MEDICATIONS  (STANDING):  morphine  - Injectable 0.5 milliGRAM(s) IV Push every 6 hours  sodium chloride 0.9%. 1000 milliLiter(s) (10 mL/Hr) IV Continuous <Continuous>    MEDICATIONS  (PRN):  glycopyrrolate Injectable 0.4 milliGRAM(s) IV Push every 6 hours PRN secretions  morphine  - Injectable 0.5 milliGRAM(s) IV Push every 1 hour PRN dyspnea    ITEMS UNCHECKED ARE NOT PRESENT    PRESENT SYMPTOMS: [ ]Unable to obtain due to poor mentation   Source if other than patient:  [ ]Family   [ ]Team     Pain (Impact on QOL):    Location:       Minimal acceptable level (0-10 scale):                    Aggravating factors:  Quality:  Radiation:  Severity (0-10 scale):     Dyspnea:                           [ ]Mild [ ]Moderate [ ]Severe  Anxiety:                             [ ]Mild [ ]Moderate [ ]Severe  Fatigue:                             [ ]Mild [ ]Moderate [ ]Severe  Nausea:                             [ ]Mild [ ]Moderate [ ]Severe  Loss of appetite:              [ ]Mild [ ]Moderate [ ]Severe  Constipation:                    [ ]Mild [ ]Moderate [ ]Severe    PAINAD Score:    [email or web address].[email or web address]/cog/painad.pdf (Ctrl +  left click to view)  		  Other Symptoms:  [ ]All other review of systems negative     Karnofsky Performance Score/Palliative Performance Status Version 2:         %        http://palliative.info/resource_material/PPSv2.pdf  PHYSICAL EXAM:  Vital Signs Last 24 Hrs  T(C): 37.1 (18 Mar 2019 08:28), Max: 37.1 (18 Mar 2019 08:28)  T(F): 98.8 (18 Mar 2019 08:28), Max: 98.8 (18 Mar 2019 08:28)  HR: 93 (18 Mar 2019 08:28) (93 - 93)  BP: 90/43 (18 Mar 2019 08:28) (90/43 - 90/43)  BP(mean): --  RR: --  SpO2: 96% (18 Mar 2019 08:28) (96% - 96%) I&O's Summary  GENERAL:  [ ]Alert  [ ]Oriented x   [ ]Lethargic  [ ]Cachexia  [ ]Unarousable  [ ]Verbal  [ ]Non-Verbal  Behavioral:   [ ] Anxiety  [ ] Delirium [ ] Agitation [ ] Other  HEENT:  [ ]Normal   [ ]Dry mouth   [ ]ET Tube/Trach  [ ]Oral lesions  PULMONARY:   [ ]Clear [ ]Tachypnea  [ ]Audible excessive secretions   [ ]Rhonchi        [ ]Right [ ]Left [ ]Bilateral  [ ]Crackles        [ ]Right [ ]Left [ ]Bilateral  [ ]Wheezing     [ ]Right [ ]Left [ ]Bilateral  CARDIOVASCULAR:    [ ]Regular [ ]Irregular [ ]Tachy  [ ]Alfonso [ ]Murmur [ ]Other  GASTROINTESTINAL:  [ ]Soft  [ ]Distended   [ ]+BS  [ ]Non tender [ ]Tender  [ ]PEG [ ]OGT/ NGT   Last BM:     GENITOURINARY:  [ ]Normal [ ] Incontinent   [ ]Oliguria/Anuria   [ ]Sawant  MUSCULOSKELETAL:   [ ]Normal   [ ]Weakness  [ ]Bed/Wheelchair bound [ ]Edema  NEUROLOGIC:   [ ]No focal deficits  [ ] Cognitive impairment  [ ] Dysphagia [ ]Dysarthria [ ] Paresis [ ]Other   SKIN:   [ ]Normal   [ ]Pressure ulcer(s)  [ ]Rash    CRITICAL CARE:  [ ] Shock Present  [ ]Septic [ ]Cardiogenic [ ]Neurologic [ ]Hypovolemic  [ ]  Vasopressors [ ]  Inotropes   [ ] Respiratory failure present  [ ] Acute  [ ] Chronic [ ] Hypoxic  [ ] Hypercarbic [ ] Other  [ ] Other organ failure     LABS:            RADIOLOGY & ADDITIONAL STUDIES:    PROTEIN CALORIE MALNUTRITION:   [ ] PPSV2 < or = 30% [ ] significant weight loss [ ] poor nutritional intake [ ] anasarca [ ] catabolic state   Prealbumin, Serum: 7 mg/dL (03-08-19 @ 13:52)   Artificial Nutrition [ ]     REFERRALS:   [ ]Chaplaincy  [ ] Hospice  [ ]Child Life  [ ]Social Work  [ ]Case management [ ]Holistic Therapy [ ] Physical Therapy [ ] Dietary   Goals of Care Document: Goals of Care Conversation - Personal Advance Directive [MOHAMUD Rico] (03-15-19 @ 17:09) GAP TEAM PALLIATIVE CARE UNIT PROGRESS NOTE:      [  ] Patient on hospice program.    INDICATION FOR PALLIATIVE CARE UNIT SERVICES:  Advanced dementia with RLL PNA, for full comfort care and management of dyspnea.    INTERVAL HPI/OVERNIGHT EVENTS: Patient actively dying.  Comfortable on present regimen.  Morphine 0.5mg x 3/24 hrs    DNR on chart: Yes    Allergies    No Known Allergies    Intolerances    MEDICATIONS  (STANDING):  morphine  - Injectable 0.5 milliGRAM(s) IV Push every 6 hours  sodium chloride 0.9%. 1000 milliLiter(s) (10 mL/Hr) IV Continuous <Continuous>    MEDICATIONS  (PRN):  glycopyrrolate Injectable 0.4 milliGRAM(s) IV Push every 6 hours PRN secretions  morphine  - Injectable 0.5 milliGRAM(s) IV Push every 1 hour PRN dyspnea    ITEMS UNCHECKED ARE NOT PRESENT    PRESENT SYMPTOMS: [x ]Unable to obtain due to poor mentation   Source if other than patient:  [ ]Family   [ ]Team     Pain (Impact on QOL):    Location:       Minimal acceptable level (0-10 scale):                    Aggravating factors:  Quality:  Radiation:  Severity (0-10 scale):     Dyspnea:                           [ ]Mild [ ]Moderate [ ]Severe  Anxiety:                             [ ]Mild [ ]Moderate [ ]Severe  Fatigue:                             [ ]Mild [ ]Moderate [ ]Severe  Nausea:                             [ ]Mild [ ]Moderate [ ]Severe  Loss of appetite:              [ ]Mild [ ]Moderate [ ]Severe  Constipation:                    [ ]Mild [ ]Moderate [ ]Severe    PAINAD Score:    http://geriatrictoolkit.[facility]/cog/painad.pdf (Ctrl +  left click to view)  		  Other Symptoms:  [ ]All other review of systems negative     Karnofsky Performance Score/Palliative Performance Status Version 2:    10     %        http://palliative.info/resource_material/PPSv2.pdf  PHYSICAL EXAM:  Vital Signs Last 24 Hrs  T(C): 37.1 (18 Mar 2019 08:28), Max: 37.1 (18 Mar 2019 08:28)  T(F): 98.8 (18 Mar 2019 08:28), Max: 98.8 (18 Mar 2019 08:28)  HR: 93 (18 Mar 2019 08:28) (93 - 93)  BP: 90/43 (18 Mar 2019 08:28) (90/43 - 90/43)  BP(mean): --  RR: --  SpO2: 96% (18 Mar 2019 08:28) (96% - 96%) I&O's Summary  GENERAL:  [ ]Alert  [ ]Oriented x   [ ]Lethargic  [ ]Cachexia  [ s]Unarousable  [ ]Verbal  [s ]Non-Verbal  Behavioral:   [ ] Anxiety  [ ] Delirium [ ] Agitation [ ] Other  HEENT:  [ ]Normal   [ ]Dry mouth   [ ]ET Tube/Trach  [ ]Oral lesions  PULMONARY:   [ ]Clear [s ]Tachypnea  [ ]Audible excessive secretions  agonal respirations  [ ]Rhonchi        [ ]Right [ ]Left [ ]Bilateral  [ ]Crackles        [ ]Right [ ]Left [ ]Bilateral  [ ]Wheezing     [ ]Right [ ]Left [ ]Bilateral  CARDIOVASCULAR:    [x ]Regular [ ]Irregular [ ]Tachy  [ ]Alfonso [x ]Murmur [ ]Other +S1 +S2   GASTROINTESTINAL:  [x ]Soft  [ ]Distended   [x ]+BS  [x ]Non tender [ ]Tender  [ ]PEG [ ]OGT/ NGT   Last BM:   See RN documentation in the EMR which I have reviewed.   GENITOURINARY:  [ ]Normal [x ] Incontinent   [ ]Oliguria/Anuria   [ ]Sawant  MUSCULOSKELETAL:   [ ]Normal   [x ]Weakness  [x ]Bed/Wheelchair bound [ ]Edema  NEUROLOGIC:   [ ]No focal deficits  [x ] Cognitive impairment  [ ] Dysphagia [ ]Dysarthria [ ] Paresis [ ]Other   SKIN:   [ ]Normal   [ ]Pressure ulcer(s)  [ ]Rash    CRITICAL CARE:  [ ] Shock Present  [ ]Septic [ ]Cardiogenic [ ]Neurologic [ ]Hypovolemic  [ ]  Vasopressors [ ]  Inotropes   [x ] Respiratory failure present  [ ] Acute  [ ] Chronic [x ] Hypoxic  [x ] Hypercarbic [ ] Other  [ ] Other organ failure     LABS: None new.             RADIOLOGY & ADDITIONAL STUDIES: None new.     PROTEIN CALORIE MALNUTRITION:   x] PPSV2 < or = 30% [ ] significant weight loss [x ] poor nutritional intake [ ] anasarca [ ] catabolic state   Prealbumin, Serum: 7 mg/dL (03-08-19 @ 13:52)   Artificial Nutrition [ ]     REFERRALS:   [ ]Chaplaincy  [ ] Hospice  [ ]Child Life  [x ]Social Work  [ ]Case management [ ]Holistic Therapy [ ] Physical Therapy [ ] Dietary   Goals of Care Document: Goals of Care Conversation - Personal Advance Directive [MOHAMUD Rico] (03-15-19 @ 17:09)

## 2019-03-18 NOTE — PROGRESS NOTE ADULT - ASSESSMENT
89F with PMH of Alzheimer's, HTN, AF ( not on AC), CHF, EF  62%,  asthma, to ED with progressive lethargy and dyspnea over a 3-4 day period, and worsening LE edema. Found to have hypercapnic respiratory failure and intubated; also underwent thoracentesis (3/6) for L sided effusion, likely related to HFrEF exacerbation +/- PNA.  Despite aggressive therapy including intubation, patient continued to decline.  She is transferred to PCU for management of dyspnea at the end of life.

## 2019-03-18 NOTE — PROGRESS NOTE ADULT - PROVIDER SPECIALTY LIST ADULT
Hospitalist
Infectious Disease
MICU
Palliative Care
Pulmonology
Infectious Disease
Hospitalist

## 2019-04-03 LAB
CULTURE RESULTS: SIGNIFICANT CHANGE UP
SPECIMEN SOURCE: SIGNIFICANT CHANGE UP

## 2019-04-24 LAB
CULTURE RESULTS: SIGNIFICANT CHANGE UP
CULTURE RESULTS: SIGNIFICANT CHANGE UP
SPECIMEN SOURCE: SIGNIFICANT CHANGE UP
SPECIMEN SOURCE: SIGNIFICANT CHANGE UP

## 2022-07-14 NOTE — SWALLOW BEDSIDE ASSESSMENT ADULT - DIET PRIOR TO ADMI
No abnormal movements Chopped/"mushed" solids and thin liquids per family report No abnormal movements No abnormal movements No abnormal movements No abnormal movements No abnormal movements No abnormal movements No abnormal movements No abnormal movements No abnormal movements

## 2024-04-09 NOTE — PROGRESS NOTE ADULT - PROBLEM SELECTOR PROBLEM 3
Pleural effusion

## 2024-12-14 NOTE — SWALLOW BEDSIDE ASSESSMENT ADULT - SLP PERTINENT HISTORY OF CURRENT PROBLEM
- Continue monitoring; will evaluate further if symptoms worsen  - Discussed potential contributing factors including caffeine, sleep, hydration, and positioning    
- Currently stable on medication regimen  - Labetalol prescription renewed  Orders:    labetalol (NORMODYNE) 100 MG tablet; Take 1 tablet by mouth 2 times daily    Microalbumin Panel    Comprehensive Metabolic Panel    Lipid Panel    
Orders:    Comprehensive Metabolic Panel    Lipid Panel    
Orders:    estradiol (ESTRACE) 0.1 MG/GM vaginal cream; Place 2 g vaginally daily    
Per MICU transfer note: 89 yr old female with PMHx of Alzheimer's, HTN, Afib ( not on AC), THN, CHF (last documented EF from 2015 of 62%), Asthma, cholecystectomy who presents to ED 3/4 from home with c/o progressively worsening SOB / lethargy over a 3-4 day period and LE edema for which she was treated with spironolactone last week. The pt was found to be in hypercapnic respiratory failure, was intubated (3/4), found to have a Left sided pleural effusion, is s/p a thoracentesis 3/6 (withdrew 750cc) of most likely transudative fluid 2/2 CHF exacerbation vs / and PNA. The pt was successfully extubated yesterday (3/6) to a BIPAP and transitioned to a NC 3L. It appears that the pt has bronchiectasis, is a chronic CO2 retainer and would suggest BIPAP nocturnal and PRN.